# Patient Record
Sex: MALE | Race: WHITE | NOT HISPANIC OR LATINO | Employment: OTHER | URBAN - METROPOLITAN AREA
[De-identification: names, ages, dates, MRNs, and addresses within clinical notes are randomized per-mention and may not be internally consistent; named-entity substitution may affect disease eponyms.]

---

## 2017-01-17 ENCOUNTER — HOSPITAL ENCOUNTER (OUTPATIENT)
Dept: GENERAL RADIOLOGY | Facility: HOSPITAL | Age: 72
Discharge: HOME | End: 2017-01-17
Admitting: ORTHOPAEDIC SURGERY

## 2017-01-17 ENCOUNTER — HOSPITAL ENCOUNTER (OUTPATIENT)
Dept: GENERAL RADIOLOGY | Facility: HOSPITAL | Age: 72
Discharge: HOME | End: 2017-01-17

## 2017-01-17 ENCOUNTER — APPOINTMENT (OUTPATIENT)
Dept: PREADMISSION TESTING | Facility: HOSPITAL | Age: 72
End: 2017-01-17

## 2017-01-17 VITALS
HEART RATE: 54 BPM | RESPIRATION RATE: 16 BRPM | WEIGHT: 301.6 LBS | DIASTOLIC BLOOD PRESSURE: 83 MMHG | OXYGEN SATURATION: 95 % | HEIGHT: 77 IN | BODY MASS INDEX: 35.61 KG/M2 | SYSTOLIC BLOOD PRESSURE: 150 MMHG | TEMPERATURE: 97 F

## 2017-01-17 DIAGNOSIS — M25.561 CHRONIC PAIN OF RIGHT KNEE: Primary | ICD-10-CM

## 2017-01-17 DIAGNOSIS — G89.29 CHRONIC PAIN OF RIGHT KNEE: Primary | ICD-10-CM

## 2017-01-17 LAB
ABO GROUP BLD: NORMAL
ALBUMIN SERPL-MCNC: 4 G/DL (ref 3.5–5.2)
ALBUMIN/GLOB SERPL: 1.3 G/DL
ALP SERPL-CCNC: 45 U/L (ref 39–117)
ALT SERPL W P-5'-P-CCNC: 34 U/L (ref 1–41)
ANION GAP SERPL CALCULATED.3IONS-SCNC: 13.1 MMOL/L
AST SERPL-CCNC: 29 U/L (ref 1–40)
BILIRUB SERPL-MCNC: 0.7 MG/DL (ref 0.1–1.2)
BILIRUB UR QL STRIP: NEGATIVE
BLD GP AB SCN SERPL QL: NEGATIVE
BUN BLD-MCNC: 20 MG/DL (ref 8–23)
BUN/CREAT SERPL: 15.6 (ref 7–25)
CALCIUM SPEC-SCNC: 9.4 MG/DL (ref 8.6–10.5)
CHLORIDE SERPL-SCNC: 99 MMOL/L (ref 98–107)
CLARITY UR: CLEAR
CO2 SERPL-SCNC: 25.9 MMOL/L (ref 22–29)
COLOR UR: YELLOW
CREAT BLD-MCNC: 1.28 MG/DL (ref 0.76–1.27)
DEPRECATED RDW RBC AUTO: 46.8 FL (ref 37–54)
ERYTHROCYTE [DISTWIDTH] IN BLOOD BY AUTOMATED COUNT: 13.6 % (ref 11.5–14.5)
GFR SERPL CREATININE-BSD FRML MDRD: 55 ML/MIN/1.73
GLOBULIN UR ELPH-MCNC: 3.2 GM/DL
GLUCOSE BLD-MCNC: 100 MG/DL (ref 65–99)
GLUCOSE UR STRIP-MCNC: NEGATIVE MG/DL
HCT VFR BLD AUTO: 51.6 % (ref 40.4–52.2)
HGB BLD-MCNC: 17.7 G/DL (ref 13.7–17.6)
HGB UR QL STRIP.AUTO: NEGATIVE
INR PPP: 1.04 (ref 0.9–1.1)
KETONES UR QL STRIP: NEGATIVE
LEUKOCYTE ESTERASE UR QL STRIP.AUTO: NEGATIVE
MCH RBC QN AUTO: 32.8 PG (ref 27–32.7)
MCHC RBC AUTO-ENTMCNC: 34.3 G/DL (ref 32.6–36.4)
MCV RBC AUTO: 95.6 FL (ref 79.8–96.2)
NITRITE UR QL STRIP: NEGATIVE
PH UR STRIP.AUTO: 7 [PH] (ref 5–8)
PLATELET # BLD AUTO: 179 10*3/MM3 (ref 140–500)
PMV BLD AUTO: 10.6 FL (ref 6–12)
POTASSIUM BLD-SCNC: 4.3 MMOL/L (ref 3.5–5.2)
PROT SERPL-MCNC: 7.2 G/DL (ref 6–8.5)
PROT UR QL STRIP: NEGATIVE
PROTHROMBIN TIME: 13.2 SECONDS (ref 11.7–14.2)
RBC # BLD AUTO: 5.4 10*6/MM3 (ref 4.6–6)
RH BLD: NEGATIVE
SODIUM BLD-SCNC: 138 MMOL/L (ref 136–145)
SP GR UR STRIP: 1.02 (ref 1–1.03)
UROBILINOGEN UR QL STRIP: NORMAL
WBC NRBC COR # BLD: 9.02 10*3/MM3 (ref 4.5–10.7)

## 2017-01-17 PROCEDURE — 93010 ELECTROCARDIOGRAM REPORT: CPT | Performed by: INTERNAL MEDICINE

## 2017-01-17 RX ORDER — VALACYCLOVIR HYDROCHLORIDE 500 MG/1
500 TABLET, FILM COATED ORAL AS NEEDED
COMMUNITY

## 2017-01-17 RX ORDER — GUAIFENESIN 400 MG/1
400 TABLET ORAL 2 TIMES DAILY
COMMUNITY

## 2017-01-17 RX ORDER — ACETAMINOPHEN 500 MG
1000 TABLET ORAL AS NEEDED
COMMUNITY
End: 2022-09-09 | Stop reason: HOSPADM

## 2017-01-17 RX ORDER — ATENOLOL 25 MG/1
25 TABLET ORAL DAILY
COMMUNITY
End: 2022-09-09 | Stop reason: HOSPADM

## 2017-01-17 RX ORDER — CHLORHEXIDINE GLUCONATE 500 MG/1
1 CLOTH TOPICAL TAKE AS DIRECTED
COMMUNITY
End: 2017-01-22 | Stop reason: HOSPADM

## 2017-01-17 RX ORDER — ASPIRIN 81 MG/1
81 TABLET ORAL DAILY
COMMUNITY
End: 2017-01-22 | Stop reason: HOSPADM

## 2017-01-17 RX ORDER — HYDROCHLOROTHIAZIDE 25 MG/1
25 TABLET ORAL DAILY
COMMUNITY
End: 2022-09-09 | Stop reason: HOSPADM

## 2017-01-17 RX ORDER — RAMIPRIL 10 MG/1
10 CAPSULE ORAL 2 TIMES DAILY
COMMUNITY
End: 2022-09-09 | Stop reason: HOSPADM

## 2017-01-17 RX ORDER — PROBENECID 500 MG/1
500 TABLET, FILM COATED ORAL 2 TIMES DAILY
COMMUNITY
End: 2022-10-05

## 2017-01-17 RX ORDER — ATORVASTATIN CALCIUM 10 MG/1
10 TABLET, FILM COATED ORAL EVERY EVENING
COMMUNITY

## 2017-01-17 NOTE — MR AVS SNAPSHOT
Chaitanya Montesinos   2017 11:00 AM   Appointment    Provider:  DAVID Boucher   Department:  [facility] PREADMISSION T   Dept Phone:  [phone]                Your Full Care Plan              Your Updated Medication List          This list is accurate as of: 17 11:12 AM.  Always use your most recent med list.                acetaminophen 500 MG tablet   Commonly known as:  TYLENOL       aspirin 81 MG EC tablet       atenolol 25 MG tablet   Commonly known as:  TENORMIN       atorvastatin 10 MG tablet   Commonly known as:  LIPITOR       Chlorhexidine Gluconate Cloth 2 % pads       hydrochlorothiazide 25 MG tablet   Commonly known as:  HYDRODIURIL       MUCUS RELIEF CHEST CONGESTION 400 MG tablet   Generic drug:  guaiFENesin       MULTIVITAMIN ADULT PO       mupirocin 2 % nasal ointment   Commonly known as:  BACTROBAN       probenecid 500 MG tablet   Commonly known as:  BENEMID       ramipril 10 MG capsule   Commonly known as:  ALTACE       valACYclovir 500 MG tablet   Commonly known as:  VALTREX               [employer] Signup     [facility] [employer] allows you to send messages to your doctor, view your test results, renew your prescriptions, schedule appointments, and more. To sign up, go to [employer] and click on the Sign Up Now link in the New User? box. Enter your [employer] Activation Code exactly as it appears below along with the last four digits of your Social Security Number and your Date of Birth () to complete the sign-up process. If you do not sign up before the expiration date, you must request a new code.    [employer] Activation Code: [email or web address]  Expires: 2017 10:43 AM    If you have questions, you can email [email or web address] or call [phone] to talk to our [employer] staff. Remember, [employer] is NOT to be used for urgent needs. For medical emergencies, dial 911.               Other Info from Your Visit           Allergies     "   No Known Allergies      Vital Signs     Blood Pressure Pulse Temperature Respirations Height Weight    150/83 (BP Location: Right arm, Patient Position: Sitting) 54 97 °F (36.1 °C) (Oral) 16 77\" (195.6 cm) 301 lb 9.6 oz (137 kg)    Oxygen Saturation Body Mass Index Smoking Status             95% 35.76 kg/m2 Former Smoker           Discharge Instructions       Take the following medications the morning of surgery with a small sip of water.  ATENOLOL    Arrive to hospital on your day of surgery at 7:30AM    General Instructions:  • Do not eat or drink after midnight: includes water, mints, or gum. You may brush your teeth.  • Do not smoke, chew tobacco, or drink alcohol.  • Bring medications in original bottles, any inhalers and if applicable your C-PAP/ BI-PAP machine.  • Bring any papers given to you in the doctor’s office.  • Wear clean comfortable clothes and socks.  • Do not wear contact lenses or make-up.  Bring a case for your glasses if applicable.   • Bring crutches or walker if applicable.  • Leave all other valuables and jewelry at home.    If you were given a blood bank ID arm band remember to bring it with you the day of surgery.    Preventing a Surgical Site Infection:  Shower on the morning of surgery using a fresh bar of anti-bacterial soap (such as Dial) and clean washcloth.  Dry with a clean towel and dress in clean clothing.  For 2 to 3 days before surgery, avoid shaving with a razor near where you will have surgery because the razor can irritate skin and make it easier to develop an infection  Ask your surgeon if you will be receiving antibiotics prior to surgery  Make sure you, your family, and all healthcare providers clean their hands with soap and water or an alcohol based hand  before caring for you or your wound  If at all possible, quit smoking as many days before surgery as you can.    Day of surgery:  Upon arrival, a Pre-op nurse and Anesthesiologist will review your health " history, obtain vital signs, and answer questions you may have.  The only belongings needed at this time will be your home medications and if applicable your C-PAP/BI-PAP machine.  If you are staying overnight your family can leave the rest of your belongings in the car and bring them to your room later.  A Pre-op nurse will start an IV and you may receive medication in preparation for surgery, including something to help you relax.  Your family will be able to see you in the Pre-op area.  While you are in surgery your family should notify the waiting room  if they leave the waiting room area and provide a contact phone number.    Please be aware that surgery does come with discomfort.  We want to make every effort to control your discomfort so please discuss any uncontrolled symptoms with your nurse.   Your doctor will most likely have prescribed pain medications.      If you are going home after surgery you will receive individualized written care instructions before being discharged.  A responsible adult must drive you to and from the hospital on the day of your surgery and stay with you for 24 hours.    If you are staying overnight following surgery, you will be transported to your hospital room following the recovery period.  [facility] has all private rooms.    If you have any questions please call Pre-Admission Testing at [phone].  Deductibles and co-payments are collected on the day of service. Please be prepared to pay the required co-pay, deductible or deposit on the day of service as defined by your plan.    2% CHLORAHEXIDINE GLUCONATE* CLOTH  Preparing or “prepping” skin before surgery can reduce the risk of infection at the surgical site. To make the process easier, [facility] has chosen disposable cloths moistened with a rinse-free, 2% Chlorhexidine Gluconate (CHG) antiseptic solution. The steps below outline the prepping process and should be carefully  followed.        Use the prep cloth on the area that is circled in the diagram             Directions Night before Surgery  1) Shower using a fresh bar of anti-bacterial soap (such as Dial) and clean washcloth.  Use a clean towel to completely dry your skin.  2) Do not use any lotions, oils or creams on your skin.  3) Open the package and remove 1 cloth, wipe your skin for 30 seconds in a circular motion.  Allow to dry for 3 minutes.  4) Repeat #3 with second cloth.  5) Do not touch your eyes, ears, or mouth with the prep cloth.  6) Allow the wet prep solution to air dry.  7) Discard the prep cloth and wash your hands with soap and water.   8) Dress in clean bed clothes and sleep on fresh clean bed sheets.   9) You may experience some temporary itching after the prep.    Directions Day of Surgery  1) Repeat steps 1,2,3,4,5,6,7, and 9.   2) Dress in clean clothes before coming to the hospital.    BACTROBAN NASAL OINTMENT  There are many germs normally in your nose. Bactroban is an ointment that will help reduce these germs. Please follow these instructions for Bactroban use:    ____Two days before surgery in the evening Date________    ____The day before surgery in the morning  Date________    ____The day before surgery in the evening              Date________    ____The day of surgery in the morning    Date________    **Squirt ½ package of Bactroban Ointment onto a cotton applicator and apply to inside of 1st nostril.  Squirt the remaining Bactroban and apply to the inside of the other nostril.         SYMPTOMS OF A STROKE    Call 911 or have someone take you to the Emergency Department if you have any of the following:    · Sudden numbness or weakness of your face, arm or leg especially on one side of the body  · Sudden confusion, diffiiculty speaking or trouble understanding   · Changes in your vision or loss of sight in one eye  · Sudden severe headache with no known cause  · sudden dizziness, trouble walking,  loss of balance or coordination    It is important to seek emergency care right away if you have further stroke symptoms. If you get emergency help quickly, the powerful clot-dissolving medicines can reduce the disabilities caused by a stroke.     For more information:    American Stroke Association  8-509-9-STROKE  www.strokeassociation.org           IF YOU SMOKE OR USE TOBACCO PLEASE READ THE FOLLOWING:    Why is smoking bad for me?  Smoking increases the risk of heart disease, lung disease, vascular disease, stroke, and cancer.     If you smoke, STOP!    If you would like more information on quitting smoking, please visit the [employer] website: [email or web address]   This link will provide additional resources including the QUIT line and the Beat the Pack support groups.     For more information:    American Cancer Society  (685) 123-1169    American Heart Association  1-456-218-3771

## 2017-01-17 NOTE — PROGRESS NOTES
Physical Therapy Outpatient Preoperative Total Joint Evaluation     Patient Name: Chaitanya Montesinos  : 1945  MRN: [id number]  Today's Date: 2017         Surgery Date: 17    There is no problem list on file for this patient.       Past Medical History   Diagnosis Date   • Arthritis    • BPH (benign prostatic hyperplasia)    • Eczema    • Gout    • High cholesterol    • Hypertension    • Joint pain    • Low back pain         Past Surgical History   Procedure Laterality Date   • Cataract extraction Right    • Cystoscopy transurethral resection of prostate     • Tonsillectomy     • Knee arthroscopy Bilateral               TOTAL JOINT PREOP EVAL (last 72 hours)      Total Joint Preop Evaluation       17 1135                Preoperative Evaluation    Surgery TKR: Right  -TV        Surgery Date 17  -TV        Previous Total Joint No  -TV        Attended Class yes  -TV        Prior Activity Status    All Household independent  -TV        All Community independent  -TV        Gait independent  -TV        Transfers independent  -TV         Spouse  -TV        DC Plans Home  -TV        Assist at home Spouse  -TV        Home Environment 1 story  -TV        Exterior steps 1 rail   2  -TV        Pain 0-10    Pain Level 5  -TV        Location r knee  -TV        LE Measurements - ROM    Hip Flexion - Right AROM is WNL;Strength is grossly > or equal to 3/5  -TV        Hip Abduction - Right AROM is WNL;Strength is grossly > or equal to 3/5  -TV        Knee Flexion - Right Strength is grossly > or equal to 3/5   120  -TV        Knee Extension - Right Strength is grossly > or equal to 3/5   -10  -TV        Hip Flexion - Left AROM is WNL;Strength is grossly > or equal to 3/5  -TV        Hip Abduction - Left AROM is WNL;Strength is grossly > or equal to 3/5  -TV        Knee Flexion - Left Strength is grossly > or equal to 3/5   120  -TV        Knee Extension - Left Strength is grossly > or equal to 3/5    -5  -TV        UE ROM/Strength    UE ROM/Strength adequate for walker use  -TV        Other Measurements    Sensation/Circulation intact  -TV        Gait Observation no device  -TV        Genu Valgus/Varus Right:;Valgus  -TV        Equipment    Equipment Patient Needs Walker  -TV        ASSESSMENT    Goal Patient demonstrates good understanding of post-op P.T.;Exercises;Surgical Procedure  -TV        Goal Met? Yes  -TV        Anticipated Progress good  -TV        Patient agrees with Plan of Treatment? Yes  -TV        Plan Will see for post op TJR protocol  -TV          User Key  (r) = Recorded By, (t) = Taken By, (c) = Cosigned By    Initials Name Effective Dates    TV Yuly Lewis, PT 01/07/16 -              Time Calculation:          Therapy Charges for Today     Code Description Service Date Service Provider Modifiers Qty    01845339179 HC PT MOBILITY CURRENT 1/17/2017 Yuly Lewis, PT GP, CI 1    26422223826 HC PT MOBILITY PROJECTED 1/17/2017 Yuly Lewis, PT GP, CI 1    27008146215 HC PT MOBILITY DISCHARGE 1/17/2017 Yuly Lewis, PT GP, CI 1    38039870751 HC PAT-TOTAL JOINT CLASS 1/17/2017 Yuly Lewis, PT  1    26204652789 HC PT EVAL LOW COMPLEXITY 1 1/17/2017 Yuly Lewis, PT GP 1            PT G-Codes  PT Professional Judgement Used?: Yes  Functional Limitation: Mobility: Walking and moving around  Mobility: Walking and Moving Around Current Status (): At least 1 percent but less than 20 percent impaired, limited or restricted  Mobility: Walking and Moving Around Goal Status (): At least 1 percent but less than 20 percent impaired, limited or restricted  Mobility: Walking and Moving Around Discharge Status (): At least 1 percent but less than 20 percent impaired, limited or restricted      Yuly Lewis, PT  1/17/2017

## 2017-01-17 NOTE — DISCHARGE INSTRUCTIONS
Take the following medications the morning of surgery with a small sip of water.  ATENOLOL    Arrive to hospital on your day of surgery at 7:30AM    General Instructions:  • Do not eat or drink after midnight: includes water, mints, or gum. You may brush your teeth.  • Do not smoke, chew tobacco, or drink alcohol.  • Bring medications in original bottles, any inhalers and if applicable your C-PAP/ BI-PAP machine.  • Bring any papers given to you in the doctor’s office.  • Wear clean comfortable clothes and socks.  • Do not wear contact lenses or make-up.  Bring a case for your glasses if applicable.   • Bring crutches or walker if applicable.  • Leave all other valuables and jewelry at home.    If you were given a blood bank ID arm band remember to bring it with you the day of surgery.    Preventing a Surgical Site Infection:  Shower on the morning of surgery using a fresh bar of anti-bacterial soap (such as Dial) and clean washcloth.  Dry with a clean towel and dress in clean clothing.  For 2 to 3 days before surgery, avoid shaving with a razor near where you will have surgery because the razor can irritate skin and make it easier to develop an infection  Ask your surgeon if you will be receiving antibiotics prior to surgery  Make sure you, your family, and all healthcare providers clean their hands with soap and water or an alcohol based hand  before caring for you or your wound  If at all possible, quit smoking as many days before surgery as you can.    Day of surgery:  Upon arrival, a Pre-op nurse and Anesthesiologist will review your health history, obtain vital signs, and answer questions you may have.  The only belongings needed at this time will be your home medications and if applicable your C-PAP/BI-PAP machine.  If you are staying overnight your family can leave the rest of your belongings in the car and bring them to your room later.  A Pre-op nurse will start an IV and you may receive  medication in preparation for surgery, including something to help you relax.  Your family will be able to see you in the Pre-op area.  While you are in surgery your family should notify the waiting room  if they leave the waiting room area and provide a contact phone number.    Please be aware that surgery does come with discomfort.  We want to make every effort to control your discomfort so please discuss any uncontrolled symptoms with your nurse.   Your doctor will most likely have prescribed pain medications.      If you are going home after surgery you will receive individualized written care instructions before being discharged.  A responsible adult must drive you to and from the hospital on the day of your surgery and stay with you for 24 hours.    If you are staying overnight following surgery, you will be transported to your hospital room following the recovery period.  [facility] has all private rooms.    If you have any questions please call Pre-Admission Testing at [phone].  Deductibles and co-payments are collected on the day of service. Please be prepared to pay the required co-pay, deductible or deposit on the day of service as defined by your plan.    2% CHLORAHEXIDINE GLUCONATE* CLOTH  Preparing or “prepping” skin before surgery can reduce the risk of infection at the surgical site. To make the process easier, [facility] has chosen disposable cloths moistened with a rinse-free, 2% Chlorhexidine Gluconate (CHG) antiseptic solution. The steps below outline the prepping process and should be carefully followed.        Use the prep cloth on the area that is circled in the diagram             Directions Night before Surgery  1) Shower using a fresh bar of anti-bacterial soap (such as Dial) and clean washcloth.  Use a clean towel to completely dry your skin.  2) Do not use any lotions, oils or creams on your skin.  3) Open the package and remove 1 cloth, wipe  your skin for 30 seconds in a circular motion.  Allow to dry for 3 minutes.  4) Repeat #3 with second cloth.  5) Do not touch your eyes, ears, or mouth with the prep cloth.  6) Allow the wet prep solution to air dry.  7) Discard the prep cloth and wash your hands with soap and water.   8) Dress in clean bed clothes and sleep on fresh clean bed sheets.   9) You may experience some temporary itching after the prep.    Directions Day of Surgery  1) Repeat steps 1,2,3,4,5,6,7, and 9.   2) Dress in clean clothes before coming to the hospital.    BACTROBAN NASAL OINTMENT  There are many germs normally in your nose. Bactroban is an ointment that will help reduce these germs. Please follow these instructions for Bactroban use:    ____Two days before surgery in the evening Date________    ____The day before surgery in the morning  Date________    ____The day before surgery in the evening              Date________    ____The day of surgery in the morning    Date________    **Squirt ½ package of Bactroban Ointment onto a cotton applicator and apply to inside of 1st nostril.  Squirt the remaining Bactroban and apply to the inside of the other nostril.

## 2017-01-19 ENCOUNTER — APPOINTMENT (OUTPATIENT)
Dept: GENERAL RADIOLOGY | Facility: HOSPITAL | Age: 72
End: 2017-01-19

## 2017-01-19 ENCOUNTER — ANESTHESIA (OUTPATIENT)
Dept: PERIOP | Facility: HOSPITAL | Age: 72
End: 2017-01-19

## 2017-01-19 ENCOUNTER — ANESTHESIA EVENT (OUTPATIENT)
Dept: PERIOP | Facility: HOSPITAL | Age: 72
End: 2017-01-19

## 2017-01-19 PROBLEM — M17.9 OA (OSTEOARTHRITIS) OF KNEE: Status: ACTIVE | Noted: 2017-01-19

## 2017-01-19 PROCEDURE — 25010000002 ONDANSETRON PER 1 MG: Performed by: ANESTHESIOLOGY

## 2017-01-19 PROCEDURE — 25010000002 PROPOFOL 10 MG/ML EMULSION: Performed by: ANESTHESIOLOGY

## 2017-01-19 PROCEDURE — 25010000002 DEXAMETHASONE PER 1 MG: Performed by: ANESTHESIOLOGY

## 2017-01-19 PROCEDURE — 25010000002 FENTANYL CITRATE (PF) 100 MCG/2ML SOLUTION: Performed by: ANESTHESIOLOGY

## 2017-01-19 PROCEDURE — 73560 X-RAY EXAM OF KNEE 1 OR 2: CPT

## 2017-01-19 PROCEDURE — 25010000002 NEOSTIGMINE 10 MG/10ML SOLUTION: Performed by: ANESTHESIOLOGY

## 2017-01-19 PROCEDURE — 25010000003 CEFAZOLIN IN DEXTROSE 2-4 GM/100ML-% SOLUTION: Performed by: ANESTHESIOLOGY

## 2017-01-19 PROCEDURE — 25010000002 ROPIVACAINE PER 1 MG: Performed by: ANESTHESIOLOGY

## 2017-01-19 RX ORDER — DEXAMETHASONE SODIUM PHOSPHATE 10 MG/ML
INJECTION INTRAMUSCULAR; INTRAVENOUS AS NEEDED
Status: DISCONTINUED | OUTPATIENT
Start: 2017-01-19 | End: 2017-01-19 | Stop reason: SURG

## 2017-01-19 RX ORDER — ROPIVACAINE HYDROCHLORIDE 5 MG/ML
INJECTION, SOLUTION EPIDURAL; INFILTRATION; PERINEURAL AS NEEDED
Status: DISCONTINUED | OUTPATIENT
Start: 2017-01-19 | End: 2017-01-19 | Stop reason: SURG

## 2017-01-19 RX ORDER — NEOSTIGMINE METHYLSULFATE 1 MG/ML
INJECTION, SOLUTION INTRAVENOUS AS NEEDED
Status: DISCONTINUED | OUTPATIENT
Start: 2017-01-19 | End: 2017-01-19 | Stop reason: SURG

## 2017-01-19 RX ORDER — GLYCOPYRROLATE 0.2 MG/ML
INJECTION INTRAMUSCULAR; INTRAVENOUS AS NEEDED
Status: DISCONTINUED | OUTPATIENT
Start: 2017-01-19 | End: 2017-01-19 | Stop reason: SURG

## 2017-01-19 RX ORDER — LIDOCAINE HYDROCHLORIDE 20 MG/ML
INJECTION, SOLUTION INFILTRATION; PERINEURAL AS NEEDED
Status: DISCONTINUED | OUTPATIENT
Start: 2017-01-19 | End: 2017-01-19 | Stop reason: SURG

## 2017-01-19 RX ORDER — ONDANSETRON 2 MG/ML
INJECTION INTRAMUSCULAR; INTRAVENOUS AS NEEDED
Status: DISCONTINUED | OUTPATIENT
Start: 2017-01-19 | End: 2017-01-19 | Stop reason: SURG

## 2017-01-19 RX ORDER — PROPOFOL 10 MG/ML
VIAL (ML) INTRAVENOUS AS NEEDED
Status: DISCONTINUED | OUTPATIENT
Start: 2017-01-19 | End: 2017-01-19 | Stop reason: SURG

## 2017-01-19 RX ORDER — ROCURONIUM BROMIDE 10 MG/ML
INJECTION, SOLUTION INTRAVENOUS AS NEEDED
Status: DISCONTINUED | OUTPATIENT
Start: 2017-01-19 | End: 2017-01-19 | Stop reason: SURG

## 2017-01-19 RX ORDER — CEFAZOLIN SODIUM 2 G/100ML
INJECTION, SOLUTION INTRAVENOUS AS NEEDED
Status: DISCONTINUED | OUTPATIENT
Start: 2017-01-19 | End: 2017-01-19 | Stop reason: SURG

## 2017-01-19 RX ADMIN — PROPOFOL 100 MG: 10 INJECTION, EMULSION INTRAVENOUS at 10:31

## 2017-01-19 RX ADMIN — CEFAZOLIN SODIUM 2 G: 2 INJECTION, SOLUTION INTRAVENOUS at 12:07

## 2017-01-19 RX ADMIN — FENTANYL CITRATE 75 MCG: 50 INJECTION INTRAMUSCULAR; INTRAVENOUS at 12:03

## 2017-01-19 RX ADMIN — FENTANYL CITRATE 50 MCG: 50 INJECTION INTRAMUSCULAR; INTRAVENOUS at 12:09

## 2017-01-19 RX ADMIN — ONDANSETRON 4 MG: 2 INJECTION INTRAMUSCULAR; INTRAVENOUS at 12:12

## 2017-01-19 RX ADMIN — LIDOCAINE HYDROCHLORIDE 100 MG: 20 INJECTION, SOLUTION INFILTRATION; PERINEURAL at 10:28

## 2017-01-19 RX ADMIN — GLYCOPYRROLATE 0.3 MG: 0.2 INJECTION INTRAMUSCULAR; INTRAVENOUS at 11:40

## 2017-01-19 RX ADMIN — SODIUM CHLORIDE, POTASSIUM CHLORIDE, SODIUM LACTATE AND CALCIUM CHLORIDE: 600; 310; 30; 20 INJECTION, SOLUTION INTRAVENOUS at 12:20

## 2017-01-19 RX ADMIN — GLYCOPYRROLATE 0.4 MG: 0.2 INJECTION INTRAMUSCULAR; INTRAVENOUS at 12:16

## 2017-01-19 RX ADMIN — DEXAMETHASONE SODIUM PHOSPHATE 6 MG: 10 INJECTION INTRAMUSCULAR; INTRAVENOUS at 10:50

## 2017-01-19 RX ADMIN — CEFAZOLIN SODIUM 3 G: 2 INJECTION, SOLUTION INTRAVENOUS at 10:18

## 2017-01-19 RX ADMIN — PROPOFOL 200 MG: 10 INJECTION, EMULSION INTRAVENOUS at 10:28

## 2017-01-19 RX ADMIN — FENTANYL CITRATE 125 MCG: 50 INJECTION INTRAMUSCULAR; INTRAVENOUS at 10:26

## 2017-01-19 RX ADMIN — ROPIVACAINE HYDROCHLORIDE 30 ML: 5 INJECTION, SOLUTION EPIDURAL; INFILTRATION; PERINEURAL at 09:15

## 2017-01-19 RX ADMIN — ROCURONIUM BROMIDE 50 MG: 10 INJECTION INTRAVENOUS at 10:28

## 2017-01-19 RX ADMIN — NEOSTIGMINE METHYLSULFATE 3 MG: 1 INJECTION INTRAVENOUS at 12:15

## 2017-01-19 NOTE — ANESTHESIA PREPROCEDURE EVALUATION
Anesthesia Evaluation     Patient summary reviewed and Nursing notes reviewed    No history of anesthetic complications   Airway   Mallampati: II  TM distance: >3 FB  Neck ROM: full  Dental - normal exam     Pulmonary - negative pulmonary ROS and normal exam   Cardiovascular - normal exam  Exercise tolerance: good (4-7 METS)  (+) hypertension,     ECG reviewed  Patient on routine beta blocker and Beta blocker given within 24 hours of surgery  ROS comment: SINUS RHYTHM  Electronically Signed by:  Jero Yang MD 17-Jan-2017 15:28:26      Neuro/Psych- negative ROS  GI/Hepatic/Renal/Endo    (+) obesity,      Musculoskeletal (-) negative ROS    Abdominal   (+) obese,     Bowel sounds: normal.   Substance History - negative use     OB/GYN negative ob/gyn ROS         Other                             Anesthesia Plan    ASA 2     general and regional     intravenous induction   Anesthetic plan and risks discussed with patient.

## 2017-01-19 NOTE — ANESTHESIA PROCEDURE NOTES
Airway  Urgency: elective    Difficult airway    General Information and Staff    Patient location during procedure: OR  Anesthesiologist: GUS CONDE    Indications and Patient Condition  Indications for airway management: airway protection    Preoxygenated: yes  Mask difficulty assessment: 2 - vent by mask + OA or adjuvant +/- NMBA    Final Airway Details  Final airway type: endotracheal airway      Successful airway: ETT    Successful intubation technique: video laryngoscopy and direct laryngoscopy  Facilitating devices/methods: cricoid pressure  Endotracheal tube insertion site: oral  Blade: Jane  Blade size: #4  ETT size: 8.0 mm  Placement verified by: chest auscultation and capnometry   Measured from: lips  ETT to lips (cm): 23  Number of attempts at approach: 3 or more    Additional Comments  With DL, could barely see epiglottis. With glide scope, still very anterior.  Large glide scope blade, and tube with stylet allowed successful intubation.  [place] scope gave a good view, but difficult to manipulate tube to glottis.

## 2017-01-19 NOTE — ANESTHESIA PROCEDURE NOTES
Peripheral Block    Patient location during procedure: holding area  Start time: 1/19/2017 9:10 AM  Stop time: 1/19/2017 9:15 AM  Reason for block: at surgeon's request and post-op pain management  Performed by  Anesthesiologist: GUS CONDE  Preanesthetic Checklist  Completed: patient identified, site marked, surgical consent, pre-op evaluation, timeout performed, IV checked, risks and benefits discussed and monitors and equipment checked  Peripheral Block Prep:  Prep: ChloraPrep  Patient monitoring: blood pressure monitoring, continuous pulse oximetry and EKG  Peripheral Procedure  Sedation:yes  Guidance:ultrasound guided  Images:still images obtained  Laterality:rightBlock Type:adductor canal block  Injection Technique:single-shotNeedle Type:echogenic  Needle Gauge:21 G  ULTRASOUND INTERPRETATION. Using ultrasound guidance a gauge needle was placed in close proximity to the femoral nerve, at which point, under ultrasound guidance anesthetic was injected in the area of the nerve and spread of the anesthesia was seen on ultrasound in close proximity thereto.  There were no abnormalities seen on ultrasound; a digital image was taken; and the patient tolerated the procedure with no complications.   Medications  Local Injected:ropivacaine 0.5% without epinephrine Local Amount Injected:30mL  Post Assessment  Patient Tolerance:comfortable throughout block  Complications:no

## 2017-01-19 NOTE — ANESTHESIA POSTPROCEDURE EVALUATION
Patient: Chaitanya Montesinos    Procedure Summary     Date Anesthesia Start Anesthesia Stop Room / Location    01/19/17 1018 1245  [facility] OR 23 / [facility] MAIN OR       Procedure Diagnosis Surgeon Provider    RT TOTAL KNEE ARTHROPLASTY (Right Knee) No diagnosis on file. MD Valentina Choudhury MD          Anesthesia Type: general, regional  Last vitals  /67 (01/19/17 1420)    Temp 36.4 °C (97.6 °F) (01/19/17 1420)    Pulse 69 (01/19/17 1420)   Resp 14 (01/19/17 1420)    SpO2 97 % (01/19/17 1420)      Post Anesthesia Care and Evaluation    Patient location during evaluation: PACU  Patient participation: complete - patient participated  Level of consciousness: awake  Pain management: adequate  Airway patency: patent  Anesthetic complications: No anesthetic complications    Cardiovascular status: acceptable  Respiratory status: acceptable  Hydration status: acceptable

## 2017-02-03 NOTE — ADDENDUM NOTE
Addendum  created 02/03/17 0151 by Bruno Carranza MD    Anesthesia Intra Blocks edited, Sign clinical note

## 2017-11-08 ENCOUNTER — HOSPITAL ENCOUNTER (OUTPATIENT)
Dept: GENERAL RADIOLOGY | Facility: HOSPITAL | Age: 72
Discharge: HOME | End: 2017-11-08
Admitting: ORTHOPAEDIC SURGERY

## 2017-11-08 ENCOUNTER — APPOINTMENT (OUTPATIENT)
Dept: PREADMISSION TESTING | Facility: HOSPITAL | Age: 72
End: 2017-11-08

## 2017-11-08 ENCOUNTER — HOSPITAL ENCOUNTER (OUTPATIENT)
Dept: GENERAL RADIOLOGY | Facility: HOSPITAL | Age: 72
Discharge: HOME | End: 2017-11-08

## 2017-11-08 VITALS
WEIGHT: 303 LBS | RESPIRATION RATE: 20 BRPM | TEMPERATURE: 96.7 F | HEART RATE: 54 BPM | HEIGHT: 77 IN | OXYGEN SATURATION: 97 % | BODY MASS INDEX: 35.78 KG/M2 | DIASTOLIC BLOOD PRESSURE: 58 MMHG | SYSTOLIC BLOOD PRESSURE: 104 MMHG

## 2017-11-08 LAB
ALBUMIN SERPL-MCNC: 3.9 G/DL (ref 3.5–5.2)
ALBUMIN/GLOB SERPL: 1.3 G/DL
ALP SERPL-CCNC: 43 U/L (ref 39–117)
ALT SERPL W P-5'-P-CCNC: 25 U/L (ref 1–41)
ANION GAP SERPL CALCULATED.3IONS-SCNC: 11.5 MMOL/L
AST SERPL-CCNC: 26 U/L (ref 1–40)
BACTERIA UR QL AUTO: ABNORMAL /HPF
BILIRUB SERPL-MCNC: 0.6 MG/DL (ref 0.1–1.2)
BILIRUB UR QL STRIP: NEGATIVE
BUN BLD-MCNC: 18 MG/DL (ref 8–23)
BUN/CREAT SERPL: 14.9 (ref 7–25)
CALCIUM SPEC-SCNC: 9.2 MG/DL (ref 8.6–10.5)
CHLORIDE SERPL-SCNC: 103 MMOL/L (ref 98–107)
CLARITY UR: CLEAR
CO2 SERPL-SCNC: 25.5 MMOL/L (ref 22–29)
COLOR UR: YELLOW
CREAT BLD-MCNC: 1.21 MG/DL (ref 0.76–1.27)
DEPRECATED RDW RBC AUTO: 44 FL (ref 37–54)
ERYTHROCYTE [DISTWIDTH] IN BLOOD BY AUTOMATED COUNT: 13 % (ref 11.5–14.5)
GFR SERPL CREATININE-BSD FRML MDRD: 59 ML/MIN/1.73
GLOBULIN UR ELPH-MCNC: 3 GM/DL
GLUCOSE BLD-MCNC: 107 MG/DL (ref 65–99)
GLUCOSE UR STRIP-MCNC: NEGATIVE MG/DL
HCT VFR BLD AUTO: 48.3 % (ref 40.4–52.2)
HGB BLD-MCNC: 16.2 G/DL (ref 13.7–17.6)
HGB UR QL STRIP.AUTO: ABNORMAL
HYALINE CASTS UR QL AUTO: ABNORMAL /LPF
INR PPP: 0.98 (ref 0.9–1.1)
KETONES UR QL STRIP: NEGATIVE
LEUKOCYTE ESTERASE UR QL STRIP.AUTO: NEGATIVE
MCH RBC QN AUTO: 31.2 PG (ref 27–32.7)
MCHC RBC AUTO-ENTMCNC: 33.5 G/DL (ref 32.6–36.4)
MCV RBC AUTO: 93.1 FL (ref 79.8–96.2)
NITRITE UR QL STRIP: NEGATIVE
PH UR STRIP.AUTO: 7 [PH] (ref 5–8)
PLATELET # BLD AUTO: 183 10*3/MM3 (ref 140–500)
PMV BLD AUTO: 11 FL (ref 6–12)
POTASSIUM BLD-SCNC: 4.3 MMOL/L (ref 3.5–5.2)
PROT SERPL-MCNC: 6.9 G/DL (ref 6–8.5)
PROT UR QL STRIP: NEGATIVE
PROTHROMBIN TIME: 12.6 SECONDS (ref 11.7–14.2)
RBC # BLD AUTO: 5.19 10*6/MM3 (ref 4.6–6)
RBC # UR: ABNORMAL /HPF
REF LAB TEST METHOD: ABNORMAL
SODIUM BLD-SCNC: 140 MMOL/L (ref 136–145)
SP GR UR STRIP: 1.02 (ref 1–1.03)
SQUAMOUS #/AREA URNS HPF: ABNORMAL /HPF
UROBILINOGEN UR QL STRIP: ABNORMAL
WBC NRBC COR # BLD: 5.94 10*3/MM3 (ref 4.5–10.7)
WBC UR QL AUTO: ABNORMAL /HPF

## 2017-11-08 PROCEDURE — 36415 COLL VENOUS BLD VENIPUNCTURE: CPT

## 2017-11-08 PROCEDURE — 93010 ELECTROCARDIOGRAM REPORT: CPT | Performed by: INTERNAL MEDICINE

## 2017-11-08 PROCEDURE — 71020 HC CHEST PA AND LATERAL: CPT

## 2017-11-08 PROCEDURE — 85610 PROTHROMBIN TIME: CPT | Performed by: ORTHOPAEDIC SURGERY

## 2017-11-08 PROCEDURE — 80053 COMPREHEN METABOLIC PANEL: CPT | Performed by: ORTHOPAEDIC SURGERY

## 2017-11-08 PROCEDURE — 81001 URINALYSIS AUTO W/SCOPE: CPT | Performed by: ORTHOPAEDIC SURGERY

## 2017-11-08 PROCEDURE — 85027 COMPLETE CBC AUTOMATED: CPT | Performed by: ORTHOPAEDIC SURGERY

## 2017-11-08 PROCEDURE — 93005 ELECTROCARDIOGRAM TRACING: CPT

## 2017-11-08 PROCEDURE — 73560 X-RAY EXAM OF KNEE 1 OR 2: CPT

## 2017-11-08 RX ORDER — FLUTICASONE PROPIONATE 50 MCG
2 SPRAY, SUSPENSION (ML) NASAL AS NEEDED
COMMUNITY
End: 2022-10-05

## 2017-11-08 RX ORDER — ASPIRIN 81 MG/1
81 TABLET, CHEWABLE ORAL DAILY
COMMUNITY
End: 2017-12-01 | Stop reason: HOSPADM

## 2017-11-08 ASSESSMENT — KOOS JR
KOOS JR SCORE: 25
KOOS JR SCORE: 20.941

## 2017-11-08 NOTE — DISCHARGE INSTRUCTIONS
2% CHLORAHEXIDINE GLUCONATE* CLOTH  Preparing or “prepping” skin before surgery can reduce the risk of infection at the surgical site. To make the process easier, [facility] has chosen disposable cloths moistened with a rinse-free, 2% Chlorhexidine Gluconate (CHG) antiseptic solution. The steps below outline the prepping process and should be carefully followed.        Use the prep cloth on the area that is circled in the diagram             Directions Night before Surgery  1) Shower using a fresh bar of anti-bacterial soap (such as Dial) and clean washcloth.  Use a clean towel to completely dry your skin.  2) Do not use any lotions, oils or creams on your skin.  3) Open the package and remove 1 cloth, wipe your skin for 30 seconds in a circular motion.  Allow to dry for 3 minutes.  4) Repeat #3 with second cloth.  5) Do not touch your eyes, ears, or mouth with the prep cloth.  6) Allow the wet prep solution to air dry.  7) Discard the prep cloth and wash your hands with soap and water.   8) Dress in clean bed clothes and sleep on fresh clean bed sheets.   9) You may experience some temporary itching after the prep.    Directions Day of Surgery  1) Repeat steps 1,2,3,4,5,6,7, and 9.   2) Dress in clean clothes before coming to the hospital.    BACTROBAN NASAL OINTMENT  There are many germs normally in your nose. Bactroban is an ointment that will help reduce these germs. Please follow these instructions for Bactroban use:    ____Two days before surgery in the evening Date________    ____The day before surgery in the morning  Date________    ____The day before surgery in the evening              Date________    ____The day of surgery in the morning    Date________    **Squirt ½ package of Bactroban Ointment onto a cotton applicator and apply to inside of 1st nostril.  Squirt the remaining Bactroban and apply to the inside of the other nostril.    PERIDEX- ORAL:  Use only if your surgeon has  ordered  Use the night before and morning of surgery - Swish, gargle, and spit - do not swallow.Take the following medications the morning of surgery with a small sip of water:        General Instructions:  • Do not eat solid food after midnight the night before surgery.  • You may drink clear liquids day of surgery but must stop at least one hour before your hospital arrival time.  • It is beneficial for you to have a clear drink that contains carbohydrates the day of surgery.  We suggest a 12 to 20 ounce bottle of Gatorade or Powerade for non-diabetic patients or a 12 to 20 ounce bottle of G2 or Powerade Zero for diabetic patients. (Pediatric patients, are not advised to drink a 12 to 20 ounce carbohydrate drink)    Clear liquids are liquids you can see through.  Nothing red in color.     Plain water                               Sports drinks  Sodas                                   Gelatin (Jell-O)  Fruit juices without pulp such as white grape juice and apple juice  Popsicles that contain no fruit or yogurt  Tea or coffee (no cream or milk added)  Gatorade / Powerade  G2 / Powerade Zero    • Infants may have breast milk up to four hours before surgery.  • Infants drinking formula may drink formula up to six hours before surgery.   • Patients who avoid smoking, chewing tobacco and alcohol for 4 weeks prior to surgery have a reduced risk of post-operative complications.  Quit smoking as many days before surgery as you can.  • Do not smoke, use chewing tobacco or drink alcohol the day of surgery.   • If applicable bring your C-PAP/ BI-PAP machine.  • Bring any papers given to you in the doctor’s office.  • Wear clean comfortable clothes and socks.  • Do not wear contact lenses or make-up.  Bring a case for your glasses.   • Bring crutches or walker if applicable.  • Remove all piercings.  Leave jewelry and any other valuables at home.  • The Pre-Admission Testing nurse will instruct you to bring medications if  unable to obtain an accurate list in Pre-Admission Testing.        If you were given a blood bank ID arm band remember to bring it with you the day of surgery.    Preventing a Surgical Site Infection:  • For 2 to 3 days before surgery, avoid shaving with a razor because the razor can irritate skin and make it easier to develop an infection.  • The night prior to surgery sleep in a clean bed with clean clothing.  Do not allow pets to sleep with you.  • Shower on the morning of surgery using a fresh bar of anti-bacterial soap (such as Dial) and clean washcloth.  Dry with a clean towel and dress in clean clothing.  • Ask your surgeon if you will be receiving antibiotics prior to surgery.  • Make sure you, your family, and all healthcare providers clean their hands with soap and water or an alcohol based hand  before caring for you or your wound.    Day of surgery:  Upon arrival, a Pre-op nurse and Anesthesiologist will review your health history, obtain vital signs, and answer questions you may have.  The only belongings needed at this time will be your home medications and if applicable your C-PAP/BI-PAP machine.  If you are staying overnight your family can leave the rest of your belongings in the car and bring them to your room later.  A Pre-op nurse will start an IV and you may receive medication in preparation for surgery, including something to help you relax.  Your family will be able to see you in the Pre-op area.  While you are in surgery your family should notify the waiting room  if they leave the waiting room area and provide a contact phone number.    Please be aware that surgery does come with discomfort.  We want to make every effort to control your discomfort so please discuss any uncontrolled symptoms with your nurse.   Your doctor will most likely have prescribed pain medications.      If you are going home after surgery you will receive individualized written care instructions  before being discharged.  A responsible adult must drive you to and from the hospital on the day of your surgery and stay with you for 24 hours.    If you are staying overnight following surgery, you will be transported to your hospital room following the recovery period.  [facility] has all private rooms.    If you have any questions please call Pre-Admission Testing at [phone].  Deductibles and co-payments are collected on the day of service. Please be prepared to pay the required co-pay, deductible or deposit on the day of service as defined by your plan.

## 2017-11-28 ENCOUNTER — APPOINTMENT (OUTPATIENT)
Dept: GENERAL RADIOLOGY | Facility: HOSPITAL | Age: 72
End: 2017-11-28

## 2017-11-28 ENCOUNTER — ANESTHESIA EVENT (OUTPATIENT)
Dept: PERIOP | Facility: HOSPITAL | Age: 72
End: 2017-11-28

## 2017-11-28 ENCOUNTER — HOSPITAL ENCOUNTER (INPATIENT)
Facility: HOSPITAL | Age: 72
LOS: 3 days | Discharge: HOME HEALTH | End: 2017-12-01
Attending: ORTHOPAEDIC SURGERY | Admitting: ORTHOPAEDIC SURGERY

## 2017-11-28 ENCOUNTER — ANESTHESIA (OUTPATIENT)
Dept: PERIOP | Facility: HOSPITAL | Age: 72
End: 2017-11-28

## 2017-11-28 DIAGNOSIS — R26.2 DIFFICULTY WALKING: Primary | ICD-10-CM

## 2017-11-28 LAB
ABO GROUP BLD: NORMAL
BLD GP AB SCN SERPL QL: NEGATIVE
RH BLD: NEGATIVE

## 2017-11-28 PROCEDURE — 25010000002 MIDAZOLAM PER 1 MG: Performed by: ANESTHESIOLOGY

## 2017-11-28 PROCEDURE — 25010000002 HYDROMORPHONE PER 4 MG: Performed by: NURSE ANESTHETIST, CERTIFIED REGISTERED

## 2017-11-28 PROCEDURE — 25010000002 ROPIVACAINE PER 1 MG: Performed by: ORTHOPAEDIC SURGERY

## 2017-11-28 PROCEDURE — 25010000002 KETOROLAC TROMETHAMINE PER 15 MG: Performed by: ORTHOPAEDIC SURGERY

## 2017-11-28 PROCEDURE — 94799 UNLISTED PULMONARY SVC/PX: CPT

## 2017-11-28 PROCEDURE — 86901 BLOOD TYPING SEROLOGIC RH(D): CPT | Performed by: ORTHOPAEDIC SURGERY

## 2017-11-28 PROCEDURE — C1776 JOINT DEVICE (IMPLANTABLE): HCPCS | Performed by: ORTHOPAEDIC SURGERY

## 2017-11-28 PROCEDURE — 25010000002 MORPHINE SULFATE (PF) 8 MG/ML SOLUTION 1 ML VIAL: Performed by: ORTHOPAEDIC SURGERY

## 2017-11-28 PROCEDURE — 25010000003 CEFAZOLIN IN DEXTROSE 2-4 GM/100ML-% SOLUTION: Performed by: ORTHOPAEDIC SURGERY

## 2017-11-28 PROCEDURE — 25010000002 EPINEPHRINE PER 0.1 MG: Performed by: ORTHOPAEDIC SURGERY

## 2017-11-28 PROCEDURE — 86850 RBC ANTIBODY SCREEN: CPT | Performed by: ORTHOPAEDIC SURGERY

## 2017-11-28 PROCEDURE — 73560 X-RAY EXAM OF KNEE 1 OR 2: CPT

## 2017-11-28 PROCEDURE — 25010000003 CEFAZOLIN IN DEXTROSE 2-4 GM/100ML-% SOLUTION: Performed by: NURSE ANESTHETIST, CERTIFIED REGISTERED

## 2017-11-28 PROCEDURE — 86900 BLOOD TYPING SEROLOGIC ABO: CPT | Performed by: ORTHOPAEDIC SURGERY

## 2017-11-28 PROCEDURE — C1713 ANCHOR/SCREW BN/BN,TIS/BN: HCPCS | Performed by: ORTHOPAEDIC SURGERY

## 2017-11-28 PROCEDURE — 25010000002 FENTANYL CITRATE (PF) 100 MCG/2ML SOLUTION: Performed by: NURSE ANESTHETIST, CERTIFIED REGISTERED

## 2017-11-28 PROCEDURE — 0SRD0J9 REPLACEMENT OF LEFT KNEE JOINT WITH SYNTHETIC SUBSTITUTE, CEMENTED, OPEN APPROACH: ICD-10-PCS | Performed by: ORTHOPAEDIC SURGERY

## 2017-11-28 PROCEDURE — 97110 THERAPEUTIC EXERCISES: CPT

## 2017-11-28 PROCEDURE — 25010000002 SUCCINYLCHOLINE PER 20 MG: Performed by: NURSE ANESTHETIST, CERTIFIED REGISTERED

## 2017-11-28 PROCEDURE — 25010000002 PROPOFOL 10 MG/ML EMULSION: Performed by: NURSE ANESTHETIST, CERTIFIED REGISTERED

## 2017-11-28 PROCEDURE — 25010000002 ONDANSETRON PER 1 MG: Performed by: NURSE ANESTHETIST, CERTIFIED REGISTERED

## 2017-11-28 PROCEDURE — 97161 PT EVAL LOW COMPLEX 20 MIN: CPT

## 2017-11-28 DEVICE — COMP FEM/KN VANGUARD INTLK CR 75MM NS LT: Type: IMPLANTABLE DEVICE | Site: KNEE | Status: FUNCTIONAL

## 2017-11-28 DEVICE — CAP TOTL KN CMT PREMIUM: Type: IMPLANTABLE DEVICE | Site: KNEE | Status: FUNCTIONAL

## 2017-11-28 DEVICE — STEM TIB PRI FINN 46X40MM: Type: IMPLANTABLE DEVICE | Site: KNEE | Status: FUNCTIONAL

## 2017-11-28 DEVICE — CMT BONE PALACOS 120001: Type: IMPLANTABLE DEVICE | Site: KNEE | Status: FUNCTIONAL

## 2017-11-28 DEVICE — TRY TIB INTERLOK PRI 83MM: Type: IMPLANTABLE DEVICE | Site: KNEE | Status: FUNCTIONAL

## 2017-11-28 DEVICE — PAT 3PEG THN 37X9.6 37MM: Type: IMPLANTABLE DEVICE | Site: KNEE | Status: FUNCTIONAL

## 2017-11-28 DEVICE — BEAR TIB/KN VANGUARD CR DCM 12X79/84MM NS: Type: IMPLANTABLE DEVICE | Site: KNEE | Status: FUNCTIONAL

## 2017-11-28 RX ORDER — SODIUM CHLORIDE 0.9 % (FLUSH) 0.9 %
1-10 SYRINGE (ML) INJECTION AS NEEDED
Status: DISCONTINUED | OUTPATIENT
Start: 2017-11-28 | End: 2017-11-28 | Stop reason: HOSPADM

## 2017-11-28 RX ORDER — ONDANSETRON 4 MG/1
4 TABLET, FILM COATED ORAL EVERY 6 HOURS PRN
Status: DISCONTINUED | OUTPATIENT
Start: 2017-11-28 | End: 2017-12-01 | Stop reason: HOSPADM

## 2017-11-28 RX ORDER — RAMIPRIL 10 MG/1
10 CAPSULE ORAL 2 TIMES DAILY
Status: DISCONTINUED | OUTPATIENT
Start: 2017-11-29 | End: 2017-12-01 | Stop reason: HOSPADM

## 2017-11-28 RX ORDER — LABETALOL HYDROCHLORIDE 5 MG/ML
5 INJECTION, SOLUTION INTRAVENOUS
Status: DISCONTINUED | OUTPATIENT
Start: 2017-11-28 | End: 2017-11-28 | Stop reason: HOSPADM

## 2017-11-28 RX ORDER — CEFAZOLIN SODIUM 2 G/100ML
2 INJECTION, SOLUTION INTRAVENOUS ONCE
Status: COMPLETED | OUTPATIENT
Start: 2017-11-28 | End: 2017-11-28

## 2017-11-28 RX ORDER — PROBENECID 500 MG/1
500 TABLET, FILM COATED ORAL 2 TIMES DAILY
Status: DISCONTINUED | OUTPATIENT
Start: 2017-11-28 | End: 2017-12-01 | Stop reason: HOSPADM

## 2017-11-28 RX ORDER — MORPHINE SULFATE 2 MG/ML
6 INJECTION, SOLUTION INTRAMUSCULAR; INTRAVENOUS
Status: DISCONTINUED | OUTPATIENT
Start: 2017-11-28 | End: 2017-12-01 | Stop reason: HOSPADM

## 2017-11-28 RX ORDER — ROCURONIUM BROMIDE 10 MG/ML
INJECTION, SOLUTION INTRAVENOUS AS NEEDED
Status: DISCONTINUED | OUTPATIENT
Start: 2017-11-28 | End: 2017-11-28 | Stop reason: SURG

## 2017-11-28 RX ORDER — KETOROLAC TROMETHAMINE 30 MG/ML
15 INJECTION, SOLUTION INTRAMUSCULAR; INTRAVENOUS EVERY 8 HOURS PRN
Status: DISPENSED | OUTPATIENT
Start: 2017-11-28 | End: 2017-11-30

## 2017-11-28 RX ORDER — NALOXONE HCL 0.4 MG/ML
0.2 VIAL (ML) INJECTION AS NEEDED
Status: DISCONTINUED | OUTPATIENT
Start: 2017-11-28 | End: 2017-11-28 | Stop reason: HOSPADM

## 2017-11-28 RX ORDER — FENTANYL CITRATE 50 UG/ML
INJECTION, SOLUTION INTRAMUSCULAR; INTRAVENOUS AS NEEDED
Status: DISCONTINUED | OUTPATIENT
Start: 2017-11-28 | End: 2017-11-28 | Stop reason: SURG

## 2017-11-28 RX ORDER — PROMETHAZINE HYDROCHLORIDE 25 MG/1
12.5 TABLET ORAL ONCE AS NEEDED
Status: DISCONTINUED | OUTPATIENT
Start: 2017-11-28 | End: 2017-11-28 | Stop reason: HOSPADM

## 2017-11-28 RX ORDER — ACETAMINOPHEN 500 MG
1000 TABLET ORAL ONCE
Status: COMPLETED | OUTPATIENT
Start: 2017-11-28 | End: 2017-11-28

## 2017-11-28 RX ORDER — SUCCINYLCHOLINE CHLORIDE 20 MG/ML
INJECTION INTRAMUSCULAR; INTRAVENOUS AS NEEDED
Status: DISCONTINUED | OUTPATIENT
Start: 2017-11-28 | End: 2017-11-28 | Stop reason: SURG

## 2017-11-28 RX ORDER — BISACODYL 10 MG
10 SUPPOSITORY, RECTAL RECTAL DAILY PRN
Status: DISCONTINUED | OUTPATIENT
Start: 2017-11-28 | End: 2017-12-01 | Stop reason: HOSPADM

## 2017-11-28 RX ORDER — DIPHENHYDRAMINE HCL 25 MG
50 CAPSULE ORAL EVERY 6 HOURS PRN
Status: DISCONTINUED | OUTPATIENT
Start: 2017-11-28 | End: 2017-12-01 | Stop reason: HOSPADM

## 2017-11-28 RX ORDER — HYDRALAZINE HYDROCHLORIDE 20 MG/ML
5 INJECTION INTRAMUSCULAR; INTRAVENOUS
Status: DISCONTINUED | OUTPATIENT
Start: 2017-11-28 | End: 2017-11-28 | Stop reason: HOSPADM

## 2017-11-28 RX ORDER — PROPOFOL 10 MG/ML
VIAL (ML) INTRAVENOUS AS NEEDED
Status: DISCONTINUED | OUTPATIENT
Start: 2017-11-28 | End: 2017-11-28 | Stop reason: SURG

## 2017-11-28 RX ORDER — GUAIFENESIN 200 MG/1
400 TABLET ORAL 2 TIMES DAILY
Status: DISCONTINUED | OUTPATIENT
Start: 2017-11-28 | End: 2017-12-01 | Stop reason: HOSPADM

## 2017-11-28 RX ORDER — CEFAZOLIN SODIUM 2 G/100ML
INJECTION, SOLUTION INTRAVENOUS AS NEEDED
Status: DISCONTINUED | OUTPATIENT
Start: 2017-11-28 | End: 2017-11-28 | Stop reason: SURG

## 2017-11-28 RX ORDER — PROMETHAZINE HYDROCHLORIDE 25 MG/1
25 TABLET ORAL ONCE AS NEEDED
Status: DISCONTINUED | OUTPATIENT
Start: 2017-11-28 | End: 2017-11-28 | Stop reason: HOSPADM

## 2017-11-28 RX ORDER — OXYCODONE AND ACETAMINOPHEN 7.5; 325 MG/1; MG/1
1 TABLET ORAL ONCE AS NEEDED
Status: DISCONTINUED | OUTPATIENT
Start: 2017-11-28 | End: 2017-11-28 | Stop reason: HOSPADM

## 2017-11-28 RX ORDER — FENTANYL CITRATE 50 UG/ML
50 INJECTION, SOLUTION INTRAMUSCULAR; INTRAVENOUS
Status: DISCONTINUED | OUTPATIENT
Start: 2017-11-28 | End: 2017-11-28 | Stop reason: HOSPADM

## 2017-11-28 RX ORDER — CLINDAMYCIN PHOSPHATE 900 MG/50ML
900 INJECTION INTRAVENOUS EVERY 8 HOURS
Status: COMPLETED | OUTPATIENT
Start: 2017-11-28 | End: 2017-11-29

## 2017-11-28 RX ORDER — EPHEDRINE SULFATE 50 MG/ML
5 INJECTION, SOLUTION INTRAVENOUS ONCE AS NEEDED
Status: DISCONTINUED | OUTPATIENT
Start: 2017-11-28 | End: 2017-11-28 | Stop reason: HOSPADM

## 2017-11-28 RX ORDER — MAGNESIUM HYDROXIDE 1200 MG/15ML
LIQUID ORAL AS NEEDED
Status: DISCONTINUED | OUTPATIENT
Start: 2017-11-28 | End: 2017-11-28 | Stop reason: HOSPADM

## 2017-11-28 RX ORDER — DIPHENHYDRAMINE HYDROCHLORIDE 50 MG/ML
12.5 INJECTION INTRAMUSCULAR; INTRAVENOUS
Status: DISCONTINUED | OUTPATIENT
Start: 2017-11-28 | End: 2017-11-28 | Stop reason: HOSPADM

## 2017-11-28 RX ORDER — SODIUM CHLORIDE, SODIUM LACTATE, POTASSIUM CHLORIDE, CALCIUM CHLORIDE 600; 310; 30; 20 MG/100ML; MG/100ML; MG/100ML; MG/100ML
9 INJECTION, SOLUTION INTRAVENOUS CONTINUOUS PRN
Status: DISCONTINUED | OUTPATIENT
Start: 2017-11-28 | End: 2017-11-28 | Stop reason: HOSPADM

## 2017-11-28 RX ORDER — ONDANSETRON 4 MG/1
4 TABLET, ORALLY DISINTEGRATING ORAL EVERY 6 HOURS PRN
Status: DISCONTINUED | OUTPATIENT
Start: 2017-11-28 | End: 2017-12-01 | Stop reason: HOSPADM

## 2017-11-28 RX ORDER — ATORVASTATIN CALCIUM 10 MG/1
10 TABLET, FILM COATED ORAL EVERY EVENING
Status: DISCONTINUED | OUTPATIENT
Start: 2017-11-28 | End: 2017-12-01 | Stop reason: HOSPADM

## 2017-11-28 RX ORDER — PROMETHAZINE HYDROCHLORIDE 25 MG/1
25 SUPPOSITORY RECTAL ONCE AS NEEDED
Status: DISCONTINUED | OUTPATIENT
Start: 2017-11-28 | End: 2017-11-28 | Stop reason: HOSPADM

## 2017-11-28 RX ORDER — CELECOXIB 200 MG/1
200 CAPSULE ORAL ONCE
Status: COMPLETED | OUTPATIENT
Start: 2017-11-28 | End: 2017-11-28

## 2017-11-28 RX ORDER — TRANEXAMIC ACID 100 MG/ML
INJECTION, SOLUTION INTRAVENOUS AS NEEDED
Status: DISCONTINUED | OUTPATIENT
Start: 2017-11-28 | End: 2017-11-28 | Stop reason: SURG

## 2017-11-28 RX ORDER — ONDANSETRON 2 MG/ML
4 INJECTION INTRAMUSCULAR; INTRAVENOUS EVERY 6 HOURS PRN
Status: DISCONTINUED | OUTPATIENT
Start: 2017-11-28 | End: 2017-12-01 | Stop reason: HOSPADM

## 2017-11-28 RX ORDER — RAMIPRIL 10 MG/1
10 CAPSULE ORAL ONCE
Status: COMPLETED | OUTPATIENT
Start: 2017-11-28 | End: 2017-11-28

## 2017-11-28 RX ORDER — NALOXONE HCL 0.4 MG/ML
0.4 VIAL (ML) INJECTION
Status: DISCONTINUED | OUTPATIENT
Start: 2017-11-28 | End: 2017-12-01 | Stop reason: HOSPADM

## 2017-11-28 RX ORDER — FAMOTIDINE 10 MG/ML
20 INJECTION, SOLUTION INTRAVENOUS
Status: DISCONTINUED | OUTPATIENT
Start: 2017-11-28 | End: 2017-11-28 | Stop reason: HOSPADM

## 2017-11-28 RX ORDER — HYDROCHLOROTHIAZIDE 25 MG/1
25 TABLET ORAL DAILY
Status: DISCONTINUED | OUTPATIENT
Start: 2017-11-29 | End: 2017-12-01 | Stop reason: HOSPADM

## 2017-11-28 RX ORDER — OXYCODONE HYDROCHLORIDE AND ACETAMINOPHEN 5; 325 MG/1; MG/1
2 TABLET ORAL EVERY 4 HOURS PRN
Status: DISCONTINUED | OUTPATIENT
Start: 2017-11-28 | End: 2017-12-01 | Stop reason: HOSPADM

## 2017-11-28 RX ORDER — ATENOLOL 25 MG/1
25 TABLET ORAL 2 TIMES DAILY
Status: DISCONTINUED | OUTPATIENT
Start: 2017-11-28 | End: 2017-12-01 | Stop reason: HOSPADM

## 2017-11-28 RX ORDER — SODIUM CHLORIDE 0.9 % (FLUSH) 0.9 %
1-10 SYRINGE (ML) INJECTION AS NEEDED
Status: DISCONTINUED | OUTPATIENT
Start: 2017-11-28 | End: 2017-12-01 | Stop reason: HOSPADM

## 2017-11-28 RX ORDER — ONDANSETRON 2 MG/ML
INJECTION INTRAMUSCULAR; INTRAVENOUS AS NEEDED
Status: DISCONTINUED | OUTPATIENT
Start: 2017-11-28 | End: 2017-11-28 | Stop reason: SURG

## 2017-11-28 RX ORDER — UREA 10 %
1 LOTION (ML) TOPICAL NIGHTLY PRN
Status: DISCONTINUED | OUTPATIENT
Start: 2017-11-28 | End: 2017-12-01 | Stop reason: HOSPADM

## 2017-11-28 RX ORDER — DOCUSATE SODIUM 100 MG/1
100 CAPSULE, LIQUID FILLED ORAL 2 TIMES DAILY PRN
Status: DISCONTINUED | OUTPATIENT
Start: 2017-11-28 | End: 2017-12-01 | Stop reason: HOSPADM

## 2017-11-28 RX ORDER — HYDROMORPHONE HYDROCHLORIDE 1 MG/ML
0.5 INJECTION, SOLUTION INTRAMUSCULAR; INTRAVENOUS; SUBCUTANEOUS
Status: DISCONTINUED | OUTPATIENT
Start: 2017-11-28 | End: 2017-11-28 | Stop reason: HOSPADM

## 2017-11-28 RX ORDER — SENNA AND DOCUSATE SODIUM 50; 8.6 MG/1; MG/1
2 TABLET, FILM COATED ORAL 2 TIMES DAILY
Status: DISCONTINUED | OUTPATIENT
Start: 2017-11-28 | End: 2017-12-01 | Stop reason: HOSPADM

## 2017-11-28 RX ORDER — DIAZEPAM 5 MG/ML
5 INJECTION, SOLUTION INTRAMUSCULAR; INTRAVENOUS 2 TIMES DAILY PRN
Status: ACTIVE | OUTPATIENT
Start: 2017-11-28 | End: 2017-11-29

## 2017-11-28 RX ORDER — FLUTICASONE PROPIONATE 50 MCG
2 SPRAY, SUSPENSION (ML) NASAL DAILY PRN
Status: DISCONTINUED | OUTPATIENT
Start: 2017-11-28 | End: 2017-12-01 | Stop reason: HOSPADM

## 2017-11-28 RX ORDER — SODIUM CHLORIDE 450 MG/100ML
100 INJECTION, SOLUTION INTRAVENOUS CONTINUOUS
Status: DISCONTINUED | OUTPATIENT
Start: 2017-11-28 | End: 2017-12-01 | Stop reason: HOSPADM

## 2017-11-28 RX ORDER — HYDROCODONE BITARTRATE AND ACETAMINOPHEN 7.5; 325 MG/1; MG/1
1 TABLET ORAL ONCE AS NEEDED
Status: DISCONTINUED | OUTPATIENT
Start: 2017-11-28 | End: 2017-11-28 | Stop reason: HOSPADM

## 2017-11-28 RX ORDER — LIDOCAINE HYDROCHLORIDE 20 MG/ML
INJECTION, SOLUTION INFILTRATION; PERINEURAL AS NEEDED
Status: DISCONTINUED | OUTPATIENT
Start: 2017-11-28 | End: 2017-11-28 | Stop reason: SURG

## 2017-11-28 RX ORDER — PROMETHAZINE HYDROCHLORIDE 25 MG/ML
12.5 INJECTION, SOLUTION INTRAMUSCULAR; INTRAVENOUS ONCE AS NEEDED
Status: DISCONTINUED | OUTPATIENT
Start: 2017-11-28 | End: 2017-11-28 | Stop reason: HOSPADM

## 2017-11-28 RX ORDER — MIDAZOLAM HYDROCHLORIDE 1 MG/ML
2 INJECTION INTRAMUSCULAR; INTRAVENOUS
Status: DISCONTINUED | OUTPATIENT
Start: 2017-11-28 | End: 2017-11-28 | Stop reason: HOSPADM

## 2017-11-28 RX ORDER — OXYCODONE HYDROCHLORIDE AND ACETAMINOPHEN 5; 325 MG/1; MG/1
1 TABLET ORAL EVERY 4 HOURS PRN
Status: DISCONTINUED | OUTPATIENT
Start: 2017-11-28 | End: 2017-12-01 | Stop reason: HOSPADM

## 2017-11-28 RX ORDER — FLUMAZENIL 0.1 MG/ML
0.2 INJECTION INTRAVENOUS AS NEEDED
Status: DISCONTINUED | OUTPATIENT
Start: 2017-11-28 | End: 2017-11-28 | Stop reason: HOSPADM

## 2017-11-28 RX ORDER — DIAZEPAM 5 MG/1
5 TABLET ORAL 2 TIMES DAILY PRN
Status: DISCONTINUED | OUTPATIENT
Start: 2017-11-28 | End: 2017-12-01 | Stop reason: HOSPADM

## 2017-11-28 RX ORDER — ONDANSETRON 2 MG/ML
4 INJECTION INTRAMUSCULAR; INTRAVENOUS ONCE AS NEEDED
Status: DISCONTINUED | OUTPATIENT
Start: 2017-11-28 | End: 2017-11-28 | Stop reason: HOSPADM

## 2017-11-28 RX ORDER — BUPIVACAINE HYDROCHLORIDE AND EPINEPHRINE 5; 5 MG/ML; UG/ML
INJECTION, SOLUTION EPIDURAL; INTRACAUDAL; PERINEURAL AS NEEDED
Status: DISCONTINUED | OUTPATIENT
Start: 2017-11-28 | End: 2017-11-28 | Stop reason: SURG

## 2017-11-28 RX ORDER — ACETAMINOPHEN 325 MG/1
650 TABLET ORAL EVERY 4 HOURS PRN
Status: DISCONTINUED | OUTPATIENT
Start: 2017-11-28 | End: 2017-12-01 | Stop reason: HOSPADM

## 2017-11-28 RX ORDER — ACETAMINOPHEN 325 MG/1
325 TABLET ORAL EVERY 4 HOURS PRN
Status: DISCONTINUED | OUTPATIENT
Start: 2017-11-28 | End: 2017-12-01 | Stop reason: HOSPADM

## 2017-11-28 RX ORDER — MIDAZOLAM HYDROCHLORIDE 1 MG/ML
1 INJECTION INTRAMUSCULAR; INTRAVENOUS
Status: DISCONTINUED | OUTPATIENT
Start: 2017-11-28 | End: 2017-11-28 | Stop reason: HOSPADM

## 2017-11-28 RX ORDER — FERROUS SULFATE 325(65) MG
325 TABLET ORAL
Status: DISCONTINUED | OUTPATIENT
Start: 2017-11-29 | End: 2017-12-01 | Stop reason: HOSPADM

## 2017-11-28 RX ORDER — ASPIRIN 325 MG
325 TABLET, DELAYED RELEASE (ENTERIC COATED) ORAL 2 TIMES DAILY WITH MEALS
Status: DISCONTINUED | OUTPATIENT
Start: 2017-11-28 | End: 2017-12-01 | Stop reason: HOSPADM

## 2017-11-28 RX ADMIN — HYDROMORPHONE HYDROCHLORIDE 0.5 MG: 2 INJECTION INTRAMUSCULAR; INTRAVENOUS; SUBCUTANEOUS at 14:29

## 2017-11-28 RX ADMIN — ATORVASTATIN CALCIUM 10 MG: 10 TABLET, FILM COATED ORAL at 20:44

## 2017-11-28 RX ADMIN — ACETAMINOPHEN 1000 MG: 500 TABLET ORAL at 09:43

## 2017-11-28 RX ADMIN — RAMIPRIL 10 MG: 10 CAPSULE ORAL at 22:40

## 2017-11-28 RX ADMIN — CLINDAMYCIN PHOSPHATE 900 MG: 18 INJECTION, SOLUTION INTRAMUSCULAR; INTRAVENOUS at 20:43

## 2017-11-28 RX ADMIN — FENTANYL CITRATE 50 MCG: 50 INJECTION, SOLUTION INTRAMUSCULAR; INTRAVENOUS at 12:41

## 2017-11-28 RX ADMIN — FENTANYL CITRATE 50 MCG: 50 INJECTION, SOLUTION INTRAMUSCULAR; INTRAVENOUS at 13:06

## 2017-11-28 RX ADMIN — BUPIVACAINE HYDROCHLORIDE AND EPINEPHRINE BITARTRATE 20 ML: 5; .0091 INJECTION, SOLUTION EPIDURAL; INTRACAUDAL; PERINEURAL at 10:58

## 2017-11-28 RX ADMIN — GUAIFENESIN 400 MG: 200 TABLET ORAL at 20:44

## 2017-11-28 RX ADMIN — FENTANYL CITRATE 50 MCG: 50 INJECTION, SOLUTION INTRAMUSCULAR; INTRAVENOUS at 14:40

## 2017-11-28 RX ADMIN — FENTANYL CITRATE 50 MCG: 50 INJECTION, SOLUTION INTRAMUSCULAR; INTRAVENOUS at 12:06

## 2017-11-28 RX ADMIN — SODIUM CHLORIDE, POTASSIUM CHLORIDE, SODIUM LACTATE AND CALCIUM CHLORIDE 9 ML/HR: 600; 310; 30; 20 INJECTION, SOLUTION INTRAVENOUS at 09:30

## 2017-11-28 RX ADMIN — MIDAZOLAM 2 MG: 1 INJECTION INTRAMUSCULAR; INTRAVENOUS at 10:53

## 2017-11-28 RX ADMIN — PROPOFOL 150 MG: 10 INJECTION, EMULSION INTRAVENOUS at 12:06

## 2017-11-28 RX ADMIN — MIDAZOLAM 2 MG: 1 INJECTION INTRAMUSCULAR; INTRAVENOUS at 11:47

## 2017-11-28 RX ADMIN — LIDOCAINE HYDROCHLORIDE 100 MG: 20 INJECTION, SOLUTION INFILTRATION; PERINEURAL at 12:06

## 2017-11-28 RX ADMIN — SODIUM CHLORIDE, POTASSIUM CHLORIDE, SODIUM LACTATE AND CALCIUM CHLORIDE: 600; 310; 30; 20 INJECTION, SOLUTION INTRAVENOUS at 13:00

## 2017-11-28 RX ADMIN — ATENOLOL 25 MG: 25 TABLET ORAL at 20:44

## 2017-11-28 RX ADMIN — OXYCODONE HYDROCHLORIDE AND ACETAMINOPHEN 1 TABLET: 5; 325 TABLET ORAL at 17:48

## 2017-11-28 RX ADMIN — FENTANYL CITRATE 50 MCG: 50 INJECTION, SOLUTION INTRAMUSCULAR; INTRAVENOUS at 12:35

## 2017-11-28 RX ADMIN — DOCUSATE SODIUM -SENNOSIDES 2 TABLET: 50; 8.6 TABLET, COATED ORAL at 17:48

## 2017-11-28 RX ADMIN — FAMOTIDINE 20 MG: 10 INJECTION, SOLUTION INTRAVENOUS at 10:47

## 2017-11-28 RX ADMIN — HYDROMORPHONE HYDROCHLORIDE 0.5 MG: 2 INJECTION INTRAMUSCULAR; INTRAVENOUS; SUBCUTANEOUS at 14:18

## 2017-11-28 RX ADMIN — PROBENECID 500 MG: 500 TABLET, FILM COATED ORAL at 20:43

## 2017-11-28 RX ADMIN — SUCCINYLCHOLINE CHLORIDE 140 MG: 20 INJECTION, SOLUTION INTRAMUSCULAR; INTRAVENOUS; PARENTERAL at 12:06

## 2017-11-28 RX ADMIN — CEFAZOLIN SODIUM 2 G: 2 INJECTION, SOLUTION INTRAVENOUS at 12:00

## 2017-11-28 RX ADMIN — FENTANYL CITRATE 50 MCG: 50 INJECTION, SOLUTION INTRAMUSCULAR; INTRAVENOUS at 14:15

## 2017-11-28 RX ADMIN — ROCURONIUM BROMIDE 5 MG: 10 INJECTION INTRAVENOUS at 12:06

## 2017-11-28 RX ADMIN — CELECOXIB 200 MG: 200 CAPSULE ORAL at 09:43

## 2017-11-28 RX ADMIN — ONDANSETRON 4 MG: 2 INJECTION INTRAMUSCULAR; INTRAVENOUS at 13:49

## 2017-11-28 RX ADMIN — FENTANYL CITRATE 50 MCG: 50 INJECTION, SOLUTION INTRAMUSCULAR; INTRAVENOUS at 13:57

## 2017-11-28 RX ADMIN — ASPIRIN 325 MG: 325 TABLET, DELAYED RELEASE ORAL at 17:48

## 2017-11-28 RX ADMIN — OXYCODONE HYDROCHLORIDE AND ACETAMINOPHEN 1 TABLET: 5; 325 TABLET ORAL at 22:08

## 2017-11-28 RX ADMIN — SUGAMMADEX 400 MG: 100 INJECTION, SOLUTION INTRAVENOUS at 13:49

## 2017-11-28 RX ADMIN — ROCURONIUM BROMIDE 35 MG: 10 INJECTION INTRAVENOUS at 12:17

## 2017-11-28 RX ADMIN — TRANEXAMIC ACID 1000 MG: 100 INJECTION, SOLUTION INTRAVENOUS at 13:27

## 2017-11-28 RX ADMIN — CEFAZOLIN SODIUM 2 G: 2 INJECTION, SOLUTION INTRAVENOUS at 13:40

## 2017-11-28 RX ADMIN — SODIUM CHLORIDE 100 ML/HR: 4.5 INJECTION, SOLUTION INTRAVENOUS at 20:43

## 2017-11-28 NOTE — ANESTHESIA POSTPROCEDURE EVALUATION
Patient: Chaitanya Montesinos    Procedure Summary     Date Anesthesia Start Anesthesia Stop Room / Location    11/28/17 1200 1407  [facility] /  [facility]       Procedure Diagnosis Surgeon Provider    LT TOTAL KNEE ARTHROPLASTY (Left Knee) No diagnosis on file. MD Bharathi Choudhury MD          Anesthesia Type: general  Last vitals  BP   144/92 (11/28/17 1445)   Temp   36.9 °C (98.5 °F) (11/28/17 1403)   Pulse   77 (11/28/17 1445)   Resp   14 (11/28/17 1445)     SpO2   94 % (11/28/17 1445)     Post Anesthesia Care and Evaluation    Patient location during evaluation: PACU  Patient participation: complete - patient participated  Level of consciousness: awake  Pain management: adequate  Airway patency: patent  Anesthetic complications: No anesthetic complications    Cardiovascular status: acceptable  Respiratory status: acceptable  Hydration status: acceptable    Comments: /92  Pulse 77  Temp 36.9 °C (98.5 °F) (Oral)   Resp 14  SpO2 94%

## 2017-11-28 NOTE — ANESTHESIA PROCEDURE NOTES
Airway  Urgency: elective    Difficult airway    General Information and Staff    Patient location during procedure: OR  Anesthesiologist: RED SHEA  CRNA: CHRISTIE CORDOVA    Indications and Patient Condition  Indications for airway management: airway protection    Preoxygenated: yes  MILS not maintained throughout  Mask difficulty assessment: 1 - vent by mask    Final Airway Details  Final airway type: endotracheal airway      Successful airway: ETT  Cuffed: yes   Successful intubation technique: video laryngoscopy  Facilitating devices/methods: intubating stylet  Endotracheal tube insertion site: oral  Blade: CMAC  Blade size: #4  ETT size: 8.0 mm  Cormack-Lehane Classification: grade I - full view of glottis  Placement verified by: chest auscultation and capnometry   Cuff volume (mL): 10  Measured from: lips  ETT to lips (cm): 24  Number of attempts at approach: 1    Additional Comments  PreO2, SIVI, easy BMV, ett placed X 1 attempt with glide scope, appears atraumatic, dentition intact

## 2017-11-28 NOTE — ANESTHESIA PROCEDURE NOTES
Peripheral Block    Patient location during procedure: holding area  Reason for block: at surgeon's request and post-op pain management  Performed by  Anesthesiologist: MARY NGUYEN  Preanesthetic Checklist  Completed: patient identified, site marked, surgical consent, pre-op evaluation, timeout performed, IV checked, risks and benefits discussed and monitors and equipment checked  Prep:  Pt Position: supine  Sterile barriers:gloves  Prep: ChloraPrep  Patient monitoring: blood pressure monitoring, continuous pulse oximetry and EKG  Procedure  Sedation:yes    Guidance:ultrasound guided  ULTRASOUND INTERPRETATION.  Using ultrasound guidance a 21 G gauge needle was placed in close proximity to the femoral nerve, at which point, under ultrasound guidance anesthetic was injected in the area of the nerve and spread of the anesthesia was seen on ultrasound in close proximity thereto.  There were no abnormalities seen on ultrasound; a digital image was taken; and the patient tolerated the procedure with no complications. Images:still images obtained    Laterality:left  Block Type:adductor canal block  Injection Technique:single-shot  Medications  Local Injected:bupivacaine 0.5% with epinephrine Local Amount Injected:20mL

## 2017-11-28 NOTE — ANESTHESIA PREPROCEDURE EVALUATION
Anesthesia Evaluation     Patient summary reviewed   history of anesthetic complications: difficult airway         Airway   Mallampati: II  Neck ROM: full  possible difficult intubation  Dental      Pulmonary    Cardiovascular     ECG reviewed  Rhythm: regular    (+) hypertension,       Neuro/Psych  GI/Hepatic/Renal/Endo    (+) obesity,      Musculoskeletal     Abdominal    Substance History      OB/GYN          Other                                        Anesthesia Plan    ASA 3     general     intravenous induction   Anesthetic plan and risks discussed with patient.  Use of blood products discussed with patient .

## 2017-11-29 LAB
ANION GAP SERPL CALCULATED.3IONS-SCNC: 12.2 MMOL/L
BUN BLD-MCNC: 22 MG/DL (ref 8–23)
BUN/CREAT SERPL: 19.1 (ref 7–25)
CALCIUM SPEC-SCNC: 8 MG/DL (ref 8.6–10.5)
CHLORIDE SERPL-SCNC: 100 MMOL/L (ref 98–107)
CO2 SERPL-SCNC: 24.8 MMOL/L (ref 22–29)
CREAT BLD-MCNC: 1.15 MG/DL (ref 0.76–1.27)
GFR SERPL CREATININE-BSD FRML MDRD: 62 ML/MIN/1.73
GLUCOSE BLD-MCNC: 131 MG/DL (ref 65–99)
HCT VFR BLD AUTO: 42.2 % (ref 40.4–52.2)
HGB BLD-MCNC: 14 G/DL (ref 13.7–17.6)
POTASSIUM BLD-SCNC: 3.8 MMOL/L (ref 3.5–5.2)
SODIUM BLD-SCNC: 137 MMOL/L (ref 136–145)

## 2017-11-29 PROCEDURE — 25010000002 KETOROLAC TROMETHAMINE PER 15 MG: Performed by: ORTHOPAEDIC SURGERY

## 2017-11-29 PROCEDURE — 85014 HEMATOCRIT: CPT | Performed by: ORTHOPAEDIC SURGERY

## 2017-11-29 PROCEDURE — 97150 GROUP THERAPEUTIC PROCEDURES: CPT

## 2017-11-29 PROCEDURE — 85018 HEMOGLOBIN: CPT | Performed by: ORTHOPAEDIC SURGERY

## 2017-11-29 PROCEDURE — 80048 BASIC METABOLIC PNL TOTAL CA: CPT | Performed by: ORTHOPAEDIC SURGERY

## 2017-11-29 PROCEDURE — 97110 THERAPEUTIC EXERCISES: CPT

## 2017-11-29 RX ADMIN — OXYCODONE HYDROCHLORIDE AND ACETAMINOPHEN 2 TABLET: 5; 325 TABLET ORAL at 10:16

## 2017-11-29 RX ADMIN — RAMIPRIL 10 MG: 10 CAPSULE ORAL at 17:56

## 2017-11-29 RX ADMIN — DOCUSATE SODIUM -SENNOSIDES 2 TABLET: 50; 8.6 TABLET, COATED ORAL at 08:26

## 2017-11-29 RX ADMIN — OXYCODONE HYDROCHLORIDE AND ACETAMINOPHEN 2 TABLET: 5; 325 TABLET ORAL at 06:28

## 2017-11-29 RX ADMIN — PROBENECID 500 MG: 500 TABLET, FILM COATED ORAL at 08:27

## 2017-11-29 RX ADMIN — OXYCODONE HYDROCHLORIDE AND ACETAMINOPHEN 2 TABLET: 5; 325 TABLET ORAL at 23:07

## 2017-11-29 RX ADMIN — OXYCODONE HYDROCHLORIDE AND ACETAMINOPHEN 2 TABLET: 5; 325 TABLET ORAL at 19:32

## 2017-11-29 RX ADMIN — ATENOLOL 25 MG: 25 TABLET ORAL at 17:56

## 2017-11-29 RX ADMIN — MUPIROCIN: 20 OINTMENT TOPICAL at 08:26

## 2017-11-29 RX ADMIN — KETOROLAC TROMETHAMINE 15 MG: 30 INJECTION, SOLUTION INTRAMUSCULAR at 13:46

## 2017-11-29 RX ADMIN — ATENOLOL 25 MG: 25 TABLET ORAL at 08:27

## 2017-11-29 RX ADMIN — FERROUS SULFATE TAB 325 MG (65 MG ELEMENTAL FE) 325 MG: 325 (65 FE) TAB at 08:27

## 2017-11-29 RX ADMIN — GUAIFENESIN 400 MG: 200 TABLET ORAL at 08:26

## 2017-11-29 RX ADMIN — RAMIPRIL 10 MG: 10 CAPSULE ORAL at 08:26

## 2017-11-29 RX ADMIN — OXYCODONE HYDROCHLORIDE AND ACETAMINOPHEN 2 TABLET: 5; 325 TABLET ORAL at 13:46

## 2017-11-29 RX ADMIN — ASPIRIN 325 MG: 325 TABLET, DELAYED RELEASE ORAL at 08:27

## 2017-11-29 RX ADMIN — HYDROCHLOROTHIAZIDE 25 MG: 25 TABLET ORAL at 08:26

## 2017-11-29 RX ADMIN — GUAIFENESIN 400 MG: 200 TABLET ORAL at 17:56

## 2017-11-29 RX ADMIN — ASPIRIN 325 MG: 325 TABLET, DELAYED RELEASE ORAL at 17:56

## 2017-11-29 RX ADMIN — CLINDAMYCIN PHOSPHATE 900 MG: 18 INJECTION, SOLUTION INTRAMUSCULAR; INTRAVENOUS at 07:50

## 2017-11-29 RX ADMIN — OXYCODONE HYDROCHLORIDE AND ACETAMINOPHEN 1 TABLET: 5; 325 TABLET ORAL at 02:24

## 2017-11-29 RX ADMIN — PROBENECID 500 MG: 500 TABLET, FILM COATED ORAL at 17:56

## 2017-11-29 RX ADMIN — MUPIROCIN: 20 OINTMENT TOPICAL at 17:56

## 2017-11-29 RX ADMIN — DOCUSATE SODIUM -SENNOSIDES 2 TABLET: 50; 8.6 TABLET, COATED ORAL at 17:56

## 2017-11-29 RX ADMIN — ATORVASTATIN CALCIUM 10 MG: 10 TABLET, FILM COATED ORAL at 17:56

## 2017-11-30 LAB
HCT VFR BLD AUTO: 39.9 % (ref 40.4–52.2)
HGB BLD-MCNC: 13.2 G/DL (ref 13.7–17.6)

## 2017-11-30 PROCEDURE — 97110 THERAPEUTIC EXERCISES: CPT

## 2017-11-30 PROCEDURE — 85018 HEMOGLOBIN: CPT | Performed by: ORTHOPAEDIC SURGERY

## 2017-11-30 PROCEDURE — 97150 GROUP THERAPEUTIC PROCEDURES: CPT

## 2017-11-30 PROCEDURE — 85014 HEMATOCRIT: CPT | Performed by: ORTHOPAEDIC SURGERY

## 2017-11-30 RX ADMIN — ATENOLOL 25 MG: 25 TABLET ORAL at 17:39

## 2017-11-30 RX ADMIN — RAMIPRIL 10 MG: 10 CAPSULE ORAL at 17:39

## 2017-11-30 RX ADMIN — GUAIFENESIN 400 MG: 200 TABLET ORAL at 17:39

## 2017-11-30 RX ADMIN — OXYCODONE HYDROCHLORIDE AND ACETAMINOPHEN 2 TABLET: 5; 325 TABLET ORAL at 12:44

## 2017-11-30 RX ADMIN — PROBENECID 500 MG: 500 TABLET, FILM COATED ORAL at 17:39

## 2017-11-30 RX ADMIN — OXYCODONE HYDROCHLORIDE AND ACETAMINOPHEN 2 TABLET: 5; 325 TABLET ORAL at 21:44

## 2017-11-30 RX ADMIN — ATORVASTATIN CALCIUM 10 MG: 10 TABLET, FILM COATED ORAL at 17:39

## 2017-11-30 RX ADMIN — ASPIRIN 325 MG: 325 TABLET, DELAYED RELEASE ORAL at 08:37

## 2017-11-30 RX ADMIN — PROBENECID 500 MG: 500 TABLET, FILM COATED ORAL at 08:36

## 2017-11-30 RX ADMIN — ATENOLOL 25 MG: 25 TABLET ORAL at 08:37

## 2017-11-30 RX ADMIN — DOCUSATE SODIUM -SENNOSIDES 2 TABLET: 50; 8.6 TABLET, COATED ORAL at 17:39

## 2017-11-30 RX ADMIN — FERROUS SULFATE TAB 325 MG (65 MG ELEMENTAL FE) 325 MG: 325 (65 FE) TAB at 08:37

## 2017-11-30 RX ADMIN — MUPIROCIN: 20 OINTMENT TOPICAL at 08:37

## 2017-11-30 RX ADMIN — ASPIRIN 325 MG: 325 TABLET, DELAYED RELEASE ORAL at 17:39

## 2017-11-30 RX ADMIN — MAGNESIUM HYDROXIDE 10 ML: 2400 SUSPENSION ORAL at 20:37

## 2017-11-30 RX ADMIN — DOCUSATE SODIUM -SENNOSIDES 2 TABLET: 50; 8.6 TABLET, COATED ORAL at 08:36

## 2017-11-30 RX ADMIN — GUAIFENESIN 400 MG: 200 TABLET ORAL at 08:37

## 2017-11-30 RX ADMIN — HYDROCHLOROTHIAZIDE 25 MG: 25 TABLET ORAL at 08:36

## 2017-11-30 RX ADMIN — OXYCODONE HYDROCHLORIDE AND ACETAMINOPHEN 2 TABLET: 5; 325 TABLET ORAL at 03:54

## 2017-11-30 RX ADMIN — OXYCODONE HYDROCHLORIDE AND ACETAMINOPHEN 2 TABLET: 5; 325 TABLET ORAL at 08:37

## 2017-11-30 RX ADMIN — RAMIPRIL 10 MG: 10 CAPSULE ORAL at 08:36

## 2017-11-30 RX ADMIN — OXYCODONE HYDROCHLORIDE AND ACETAMINOPHEN 2 TABLET: 5; 325 TABLET ORAL at 17:40

## 2017-12-01 VITALS
SYSTOLIC BLOOD PRESSURE: 130 MMHG | HEIGHT: 77 IN | RESPIRATION RATE: 16 BRPM | HEART RATE: 68 BPM | TEMPERATURE: 97 F | OXYGEN SATURATION: 96 % | WEIGHT: 303 LBS | BODY MASS INDEX: 35.78 KG/M2 | DIASTOLIC BLOOD PRESSURE: 61 MMHG

## 2017-12-01 LAB
HCT VFR BLD AUTO: 39.6 % (ref 40.4–52.2)
HGB BLD-MCNC: 13 G/DL (ref 13.7–17.6)

## 2017-12-01 PROCEDURE — 97150 GROUP THERAPEUTIC PROCEDURES: CPT

## 2017-12-01 PROCEDURE — 85014 HEMATOCRIT: CPT | Performed by: ORTHOPAEDIC SURGERY

## 2017-12-01 PROCEDURE — 97110 THERAPEUTIC EXERCISES: CPT

## 2017-12-01 PROCEDURE — 85018 HEMOGLOBIN: CPT | Performed by: ORTHOPAEDIC SURGERY

## 2017-12-01 RX ORDER — OXYCODONE HYDROCHLORIDE AND ACETAMINOPHEN 5; 325 MG/1; MG/1
1-2 TABLET ORAL EVERY 4 HOURS PRN
Qty: 80 TABLET | Refills: 0 | Status: SHIPPED | OUTPATIENT
Start: 2017-12-01 | End: 2022-09-09 | Stop reason: HOSPADM

## 2017-12-01 RX ORDER — PSEUDOEPHEDRINE HCL 30 MG
100 TABLET ORAL 2 TIMES DAILY PRN
Qty: 40 CAPSULE | Refills: 1 | Status: SHIPPED | OUTPATIENT
Start: 2017-12-01

## 2017-12-01 RX ADMIN — ATENOLOL 25 MG: 25 TABLET ORAL at 08:25

## 2017-12-01 RX ADMIN — FERROUS SULFATE TAB 325 MG (65 MG ELEMENTAL FE) 325 MG: 325 (65 FE) TAB at 08:25

## 2017-12-01 RX ADMIN — DOCUSATE SODIUM 100 MG: 100 CAPSULE, LIQUID FILLED ORAL at 02:31

## 2017-12-01 RX ADMIN — HYDROCHLOROTHIAZIDE 25 MG: 25 TABLET ORAL at 08:25

## 2017-12-01 RX ADMIN — GUAIFENESIN 400 MG: 200 TABLET ORAL at 08:25

## 2017-12-01 RX ADMIN — RAMIPRIL 10 MG: 10 CAPSULE ORAL at 08:25

## 2017-12-01 RX ADMIN — DOCUSATE SODIUM -SENNOSIDES 2 TABLET: 50; 8.6 TABLET, COATED ORAL at 08:25

## 2017-12-01 RX ADMIN — OXYCODONE HYDROCHLORIDE AND ACETAMINOPHEN 2 TABLET: 5; 325 TABLET ORAL at 02:31

## 2017-12-01 RX ADMIN — PROBENECID 500 MG: 500 TABLET, FILM COATED ORAL at 08:25

## 2017-12-01 RX ADMIN — OXYCODONE HYDROCHLORIDE AND ACETAMINOPHEN 2 TABLET: 5; 325 TABLET ORAL at 07:33

## 2017-12-01 RX ADMIN — ASPIRIN 325 MG: 325 TABLET, DELAYED RELEASE ORAL at 08:25

## 2017-12-01 RX ADMIN — OXYCODONE HYDROCHLORIDE AND ACETAMINOPHEN 1 TABLET: 5; 325 TABLET ORAL at 13:32

## 2017-12-01 NOTE — DISCHARGE SUMMARY
Discharge Summary    Patient Name:  Chaitanya Montesinos  YOB: 1945  Medical Records Number:  [id number]    Date of Admission:  11/28/2017  Date of Discharge:  12/1/2017    Primary Discharge Diagnosis:  OA (osteoarthritis) of knee [M17.10]    Secondary Discharge Diagnosis:    Problem List Items Addressed This Visit     None          Procedures Performed:  Left Total Knee Arthroplasty      Hospital Course:    Chaitanya Montesinos is a 72 y.o.  male who underwent successful left tka on 11/28/2017.  Chaitanya Montesinos was started on Aspirin 325mg po twice daily immediately post-operatively for DVT prophylaxis.  On post-op day 1 the patients dressing was changed and their incision was clean, with no signs of infection and their calf was soft, with no signs of DVT.  The patient progressed well with physical therapy and the patients hemoglobin remained stable. On post-operative day 3 the patient was felt ready for discharge.      Total Knee Joint Replacement Discharge Instructions:    I. ACTIVITIES:  1. Exercises:  ? Complete exercise program as taught by the hospital physical therapist 2 times per day  ? Exercise program will be advanced by the physical therapist  ? During the day be up ambulating every 2 hours (while awake) for short distances  ? Complete the ankle pump exercises at least 10 times per hour (while awake)  ? Elevate legs most of the day the first week post operatively and thereafter elevate legs when in bed and for at least 30 minutes during the day. Caution must be taken to avoid pillow placement under the bend of the knee as this can led to flexion contractures of the knee.  ? Use cold packs 20-30 minutes approximately 5 times per day. This should be done before and after completing your exercises and at any time you are experiencing pain/ stiffness in your operative extremity.      2. Activities of Daily Living:  ? No tub baths, hot tubs, or swimming pools for 4 weeks  ? May shower and let water run over the  incision on post-operative day #5 if no drainage. Do not scrub or rub the incision. Simply let the water run over the incision and pat dry.    II. Restrictions  ? Do not cross legs or kneel  ? Your surgeon will discuss with you when you will be able to drive again.  ? Weight bearing is as tolerated  ? First week stay inside on even terrain. May go up and down stairs one stair at a time utilizing the hand rail  ? After one week, you may venture outside.    III. Precautions:  ? Everyone that comes near you should wash their hands  ? No elective dental, genital-urinary, or colon procedures or surgical procedures for 12 weeks after surgery unless absolutely necessary.  ?  If dental work or surgical procedure is deemed absolutely necessary, you will need to contact your surgeon as you will need to take antibiotics 1 hour prior to any dental work (including teeth cleanings).  ? Please discuss with your surgeon prophylactic antibiotics as the length of time this intervention will be necessary for you varies with each patient’s health history and situation.  ? Avoid sick people. If you must be around someone who is ill, they should wear a mask.  ? Avoid visits to the Emergency Room or Urgent Care unless you are having a life threatening event.   ? If ordered stockings are to be placed on in the morning and removed at night. Monitor the stockings to ensure that any swelling is not causing the stockings to become too tight. In this case, remove stockings immediately.    IV. INCISION CARE:  ? Wash your hands prior to dressing changes  ? Change the dressing as needed to keep incision clean and dry. Utilize dry gauze and paper tape. Avoid touching the side of the gauze that goes against the incision with your hands.  ? No creams or ointments to the incision  ? May remove dressing once the incision is free of drainage  ? Do not touch or pick at the incision  ? Check incision every day and notify surgeon immediately if any of the  following signs or symptoms are noted:  o Increase in redness  o Increase in swelling around the incision and of the entire extremity  o Increase in pain  o Drainage oozing from the incision  o Pulling apart of the edges of the incision  o Increase in overall body temperature (greater than 100.5 degrees)  ? Your surgeon will instruct you regarding suture or staple removal    V. Medications:   1. Anticoagulants: You will be discharged on an anticoagulant. This is a prophylactic medication that helps prevent blood clots during your post-operative period. The type and length of dosage varies based on your individual needs, procedure performed, and surgeon’s preference.  ? While taking the anticoagulant, you should avoid taking any additional aspirin, ibuprofen (Advil or Motrin), Aleve (Naprosyn) or other non-steroidal anti-inflammatory medications.   ? Notify surgeon immediately if any lucas bleeding is noted in the urine, stool, emesis, or from the nose or the incision. Blood in the stool will often appear as black rather than red. Blood in urine may appear as pink. Blood in emesis may appear as brown/black like coffee grounds.  ? You will need to apply pressure for longer periods of time to any cuts or abrasions to stop bleeding  ? Avoid alcohol while taking anticoagulants    2. Stool Softeners: You will be at greater risk of constipation after surgery due to being less mobile and the pain medications.   ? Take stool softeners as instructed by your surgeon while on pain medications. Over the counter Colace 100 mg 1-2 capsules twice daily.   ? If stools become too loose or too frequent, please decreases the dosage or stop the stool softener.  ? If constipation occurs despite use of stool softeners, you are to continue the stool softeners and add a laxative (Milk of Magnesia 1 ounce daily as needed)  ? Drink plenty of fluids, and eat fruits and vegetables during your recovery time    3. Pain Medications utilized after  surgery are narcotics and the law requires that the following information be given to all patients that are prescribed narcotics:  ? CLASSIFICATION: Pain medications are called Opioids and are narcotics  ? LEGALITIES: It is illegal to share narcotics with others and to drive within 24 hours of taking narcotics  ? POTENTIAL SIDE EFFECTS: Potential side effects of opioids include: nausea, vomiting, itching, dizziness, drowsiness, dry mouth, constipation, and difficulty urinating.  ? POTENTIAL ADVERSE EFFECTS:   o Opioid tolerance can develop with use of pain medications and this simply means that it requires more and more of the medication to control pain; however, this is seen more in patients that use opioids for longer periods of time.  o Opioid dependence can develop with use of Opioids and this simply means that to stop the medication can cause withdrawal symptoms; however, this is seen with patients that use Opioids for longer periods of time.  o Opioid addiction can develop with use of Opioids and the incidence of this is very unlikely in patients who take the medications as ordered and stop the medications as instructed.  o Opioid overdose can be dangerous, but is unlikely when the medication is taken as ordered and stopped when ordered. It is important not to mix opioids with alcohol or with and type of sedative such as Benadryl as this can lead to over sedation and respiratory difficulty.  ? DOSAGE:   o Pain medications will need to be taken consistently for the first week to decrease pain and promote adequate pain relief and participation in physical therapy.  o After the initial surgical pain begins to resolve, you may begin to decrease the pain medication. By the end of 6-8 weeks, you should be off of pain medications.  o Refills will not be given by the office during evening hours, on weekends, or after 6-8 weeks post-op.  o To seek refills on pain medications during the initial 6 week post-operative  period, you must call the office 48 hours in advance to request the refill. The office will then notify you when to  the prescription. DO NOT wait until you are out of the medication to request a refill.    V. FOLLOW-UP VISITS:  ? You will need to follow up in the office with your surgeon in 3 weeks. Please call this number [phone] to schedule this appointment.  If you have any concerns or suspected complications prior to your follow up visit, please call your surgeons office. Do not wait until your appointment time if you suspect complications. These will need to be addressed in the office promptly.      Discharge Medications:     1) Percocet 5/325 mg  1-2 po q 4-6 hours for pain control  2)  Aspirin 325 mg po twice daily for 2 weeks, then once daily for 4 weeks.    CC:No Known Provider:Arvind Jones MD

## 2017-12-01 NOTE — PLAN OF CARE
Problem: Patient Care Overview (Adult)  Goal: Plan of Care Review  Outcome: Outcome(s) achieved Date Met:  12/01/17 12/01/17 3106   Coping/Psychosocial Response Interventions   Plan Of Care Reviewed With patient;spouse;daughter   Patient Care Overview   Progress improving   Outcome Evaluation   Outcome Summary/Follow up Plan Pt pain well controlled on oral meds, ambulating with st by assist and walker, continues with constipation. Encouraged to take prn colace at home on a routine basis to assist with elimination. Discharge teaching completed including need to continue to monitor B/P due to dx of HTN. .        Goal: Adult Individualization and Mutuality  Outcome: Outcome(s) achieved Date Met:  12/01/17  Goal: Discharge Needs Assessment  Outcome: Outcome(s) achieved Date Met:  12/01/17    Problem: Perioperative Period (Adult)  Goal: Signs and Symptoms of Listed Potential Problems Will be Absent or Manageable (Perioperative Period)  Outcome: Outcome(s) achieved Date Met:  12/01/17    Problem: Fall Risk (Adult)  Goal: Absence of Falls  Outcome: Outcome(s) achieved Date Met:  12/01/17

## 2017-12-01 NOTE — PLAN OF CARE
Problem: Inpatient Physical Therapy  Goal: Bed Mobility Goal LTG- PT    12/01/17 1036   Bed Mobility PT LTG   Bed Mobility PT LTG, Outcome goal met       Goal: Transfer Training Goal 1 LTG- PT    12/01/17 1036   Transfer Training PT LTG   Transfer Training PT LTG, Outcome goal met       Goal: Gait Training Goal LTG- PT    12/01/17 1036   Gait Training PT LTG   Gait Training Goal PT LTG, Outcome goal met       Goal: Stair Training Goal LTG- PT    12/01/17 1036   Stair Training PT LTG   Stair Training Goal PT LTG, Outcome goal met       Goal: Range of Motion Goal LTG- PT    12/01/17 1036   Range of Motion PT LTG   Range fo Motion Goal PT LTG, Joint L knee   Range of Motion Goal PT LTG, AROM Measure (10,92)   Range of Motion Goal PT LTG, Outcome goal partially met

## 2017-12-01 NOTE — PROGRESS NOTES
"Ortho POD 3    Patient Name:  Chaitanya Montesinos  YOB: 1945  Medical Records Number:  [id number]    No complaints    /71 (BP Location: Right arm, Patient Position: Lying)  Pulse 62  Temp 98.3 °F (36.8 °C) (Oral)   Resp 16  Ht 77\" (195.6 cm)  Wt (!) 303 lb (137 kg) Comment: see previous charting  SpO2 94%  BMI 35.93 kg/m2    LLE:  NVI, calf nontender, sensation intact  No signs of DVT    Incision: clean, no infection    Lab Results (last 24 hours)     Procedure Component Value Units Date/Time    Hemoglobin & Hematocrit, Blood [[id number]]  (Abnormal) Collected:  12/01/17 0412    Specimen:  Blood Updated:  12/01/17 0450     Hemoglobin 13.0 (L) g/dL      Hematocrit 39.6 (L) %           S/p Left TKA  WBAT with walker  ASA for DVT prophylaxis  Home with Home Health  "

## 2017-12-01 NOTE — PLAN OF CARE
Problem: Patient Care Overview (Adult)  Goal: Plan of Care Review  Outcome: Ongoing (interventions implemented as appropriate)    12/01/17 0338   Coping/Psychosocial Response Interventions   Plan Of Care Reviewed With patient;spouse   Patient Care Overview   Progress improving   Outcome Evaluation   Outcome Summary/Follow up Plan VSS. Co-morbidity of HTN has been stable-pt verbalizes understanding of education related to this. Neurovascular assessments WNL. Up and ambulating with assist X1. Reports pain has been well controlled with PO analgesic. PRN medications given for c/o constipation. Fluid and mobility encourage for this as well. Plans to d/c home this date,         Problem: Knee Replacement, Total (Adult)  Goal: Signs and Symptoms of Listed Potential Problems Will be Absent or Manageable (Knee Replacement, Total)  Outcome: Outcome(s) achieved Date Met:  12/01/17 12/01/17 0338   Knee Replacement, Total   Problems Present (Total Knee Replacement) decreased range of motion;functional decline/self care deficit;pain         Problem: Fall Risk (Adult)  Goal: Absence of Falls  Outcome: Ongoing (interventions implemented as appropriate)

## 2017-12-01 NOTE — THERAPY DISCHARGE NOTE
Acute Care - Physical Therapy Treatment Note/Discharge  [facility]     Patient Name: Chaitanya Montesinos  : 1945  MRN: [id number]  Today's Date: 2017  Onset of Illness/Injury or Date of Surgery Date: 17  Date of Referral to PT: 17  Referring Physician: Arvind Shelton    Admit Date: 2017    Visit Dx:    ICD-10-CM ICD-9-CM   1. Difficulty walking R26.2 719.7     Patient Active Problem List   Diagnosis   • OA (osteoarthritis) of knee       Physical Therapy Education     Title: PT OT SLP Therapies (Done)     Topic: Physical Therapy (Done)     Point: Mobility training (Done)    Learning Progress Summary    Learner Readiness Method Response Comment Documented by Status   Patient Acceptance E,D VU,DU  MS 17 1035 Done    Acceptance E,D VU,NR  MS 17 1138 Done    Acceptance E,TB VU   17 0225 Done    Acceptance E,D VU,NR  MS 17 1626 Done    Acceptance D,E VU,NR  MS 17 1120 Done    Acceptance E,D VU,NR  MS 17 1651 Done   Family Acceptance E,TB VU   17 0225 Done               Point: Home exercise program (Done)    Learning Progress Summary    Learner Readiness Method Response Comment Documented by Status   Patient Acceptance E,D VU,DU  MS 17 1035 Done    Acceptance E,D VU,NR  MS 17 1138 Done    Acceptance E,TB VU   17 0225 Done    Acceptance E,D VU,NR  MS 17 1626 Done    Acceptance D,E VU,NR  MS 17 1120 Done    Acceptance E,D VU,NR  MS 17 1651 Done   Family Acceptance E,TB VU   17 0225 Done               Point: Body mechanics (Done)    Learning Progress Summary    Learner Readiness Method Response Comment Documented by Status   Patient Acceptance E,D VU,DU  MS 17 1035 Done    Acceptance E,D VU,NR  MS 17 1138 Done    Acceptance E,TB VU   17 0225 Done    Acceptance E,D VU,NR  MS 17 1626 Done    Acceptance D,E VU,NR  MS 17 1120 Done    Acceptance E,D VU,NR  MS 17 9991 Done    Family Acceptance E,RISHI VU   11/30/17 0225 Done               Point: Precautions (Done)    Learning Progress Summary    Learner Readiness Method Response Comment Documented by Status   Patient Acceptance E,D VU,DU  MS 12/01/17 1035 Done    Acceptance E,D VU,NR  MS 11/30/17 1138 Done    Acceptance ETB VU   11/30/17 0225 Done    Acceptance E,D VU,NR  MS 11/29/17 1626 Done    Acceptance D,E VU,NR  MS 11/29/17 1120 Done    Acceptance E,D VU,NR  MS 11/28/17 1651 Done   Family Acceptance E,TB VU   11/30/17 0225 Done                      User Key     Initials Effective Dates Name Provider Type Discipline    MS 12/01/15 -  Sammy Manuel, PT Physical Therapist PT     04/28/17 -  Katarina Monzon, RN Registered Nurse Nurse                    IP PT Goals       12/01/17 1036 11/28/17 1653       Bed Mobility PT LTG    Bed Mobility PT LTG, Date Established  11/28/17  -MS     Bed Mobility PT LTG, Time to Achieve  3 days  -MS     Bed Mobility PT LTG, Activity Type  all bed mobility  -MS     Bed Mobility PT LTG, [place] Level  independent  -MS     Bed Mobility PT LTG, Outcome goal met  -MS      Transfer Training PT LTG    Transfer Training PT LTG, Date Established  11/28/17  -MS     Transfer Training PT LTG, Time to Achieve  3 days  -MS     Transfer Training PT LTG, Activity Type  all transfers  -MS     Transfer Training PT LTG, [place] Level  independent  -MS     Transfer Training PT LTG, Assist Device  walker, rolling  -MS     Transfer Training PT LTG, Outcome goal met  -MS      Gait Training PT LTG    Gait Training Goal PT LTG, Date Established  11/28/17  -MS     Gait Training Goal PT LTG, Time to Achieve  3 days  -MS     Gait Training Goal PT LTG, [place] Level  independent  -MS     Gait Training Goal PT LTG, Assist Device  walker, rolling  -MS     Gait Training Goal PT LTG, Distance to Achieve  100 feet  -MS     Gait Training Goal PT LTG, Outcome goal met  -MS      Stair Training PT LTG    Stair  Training Goal PT LTG, Date Established  11/28/17  -MS     Stair Training Goal PT LTG, Time to Achieve  3 days  -MS     Stair Training Goal PT LTG, Number of Steps  2  -MS     Stair Training Goal PT LTG, [place] Level  contact guard assist  -MS     Stair Training Goal PT LTG, Assist Device  1 handrail  -MS     Stair Training Goal PT LTG, Outcome goal met  -MS      Range of Motion PT LTG    Range of Motion Goal PT LTG, Date Established  11/28/17  -MS     Range of Motion Goal PT LTG, Time to Achieve  3 days  -MS     Range fo Motion Goal PT LTG, Joint L knee  -MS L knee  -MS     Range of Motion Goal PT LTG, AROM Measure (10,92)  -MS (5, 85)  -MS     Range of Motion Goal PT LTG, Outcome goal partially met  -MS        User Key  (r) = Recorded By, (t) = Taken By, (c) = Cosigned By    Initials Name Provider Type    MS Sammy Manuel, PT Physical Therapist              Adult Rehabilitation Note       12/01/17 1031 11/30/17 1347 11/30/17 1308    Rehab Assessment/Intervention    Discipline physical therapist  -MS  physical therapy assistant  -    Document Type therapy note (daily note);discharge summary  -MS  therapy note (daily note)  -    Subjective Information agree to therapy;complains of;pain  -MS  agree to therapy;complains of;weakness;fatigue;pain;swelling  -    Patient Effort, Rehab Treatment good  -MS      Symptoms Noted During/After Treatment fatigue  -MS      Precautions/Limitations   fall precautions  -    Recorded by [MS] Sammy Manuel, PT  [JM] Katarina Remy PTA    Pain Assessment    Pain Assessment 0-10  -MS  0-10  -JM    Pain Score 5  -MS  6  -JM    Post Pain Score 5  -MS      Pain Type Acute pain;Surgical pain  -MS  Surgical pain  -JM    Pain Location Knee  -MS  Knee  -JM    Pain Orientation Left  -MS  Left  -JM    Pain Intervention(s) Medication (See MAR);Cold applied;Repositioned;Elevated;Rest  -MS  Medication (See MAR);Repositioned;Cold applied  -JM    Response to Interventions   dali   -JM    Recorded by [MS] Sammy Manuel, PT  [JM] Katarina Remy PTA    Cognitive Assessment/Intervention    Current Cognitive/Communication Assessment functional  -MS --  -JM     Orientation Status oriented x 4  -MS --  -JM     Follows Commands/Answers Questions 100% of the time  -MS      Personal Safety WNL/WFL  -MS      Personal Safety Interventions fall prevention program maintained;gait belt;nonskid shoes/slippers when out of bed;supervised activity  -MS      Recorded by [MS] Sammy Manuel, PT [JM] Katarina Remy PTA     ROM (Range of Motion)    General ROM Detail Left knee AROM (10, 92)  -MS      Recorded by [MS] Sammy Manuel PT      Bed Mobility, Assessment/Treatment    Bed Mob, Sit to Supine, [place] independent  -MS --  -JM     Bed Mobility, Comment   in chair  -JM    Recorded by [MS] Sammy Manuel, PT [JM] Katarina Remy PTA [JM] Katarina Remy PTA    Transfer Assessment/Treatment    Transfers, Sit-Stand [place] independent  -MS --  -JM stand by assist  -    Transfers, Stand-Sit [place] independent  -MS --  -JM stand by assist  -    Transfers, Sit-Stand-Sit, Assist Device rolling walker  -MS --  -JM rolling walker  -JM    Recorded by [MS] Sammy Manuel, PT [JM] Katarina Remy, PTA [JM] Katarina Remy PTA    Gait Assessment/Treatment    Gait, [place] Level independent  -MS --  -JM stand by assist  -    Gait, Assistive Device rolling walker  -MS --  -JM rolling walker  -JM    Gait, Distance (Feet) 120  -MS  120  -JM    Gait, Gait Pattern Analysis swing-through gait  -MS --  -JM swing-through gait  -JM    Gait, Gait Deviations left:;antalgic  -MS      Gait, Comment verbal cues for posture correction.  -MS  extremely fatigued by end of amb, offered chair but pt declined  -JM    Recorded by [MS] Sammy Manuel, PT [JM] Katarina Remy, PTA [JM] Katarina Remy PTA    Stairs Assessment/Treatment    Number of Stairs 4  -MS  4  -JM    Stairs, Handrail Location  right side (ascending)  -MS  right side (ascending)  -    Stairs, [place] Level supervision required  -MS  minimum assist (75% patient effort);contact guard assist;verbal cues required  -    Stairs, Technique Used   step to step (ascending);step to step (descending)  -    Stairs, Safety Issues   weight-shifting ability decreased;balance decreased during turns  -    Stairs, Impairments   strength decreased;impaired balance;pain  -    Stairs, Comment   fam present for educ, encouraged use of gt belt  -JM    Recorded by [MS] Sammy Manuel, PT  [JM] Katarina Remy PTA    Therapy Exercises    Exercise Protocols total knee  -MS --  -JM total knee  -JM    Total Knee Exercises left:;30 reps;completed protocol  -MS --  -JM left:;completed protocol;with assist;30 reps   only assist to initiate SLR  -JM    Recorded by [MS] Sammy Manuel, PT [JM] Katarina Remy PTA [JM] Katarina Remy PTA    Positioning and Restraints    Pre-Treatment Position sitting in chair/recliner  -MS  sitting in chair/recliner  -JM    Post Treatment Position bed  -MS  other  -JM    In Bed notified nsg;supine;call light within reach;encouraged to call for assist;with family/caregiver  -MS      Other Position   return to room with caregiver  -JM    Recorded by [MS] Sammy Manuel, PT  [JM] Katarina Remy PTA      11/30/17 1136 11/29/17 1624 11/29/17 1117    Rehab Assessment/Intervention    Discipline physical therapist  -MS physical therapist  -MS physical therapist  -MS    Document Type therapy note (daily note)  -MS therapy note (daily note)  -MS therapy note (daily note)  -MS    Subjective Information agree to therapy;complains of;fatigue;pain  -MS agree to therapy;complains of;fatigue;pain  -MS agree to therapy;complains of;fatigue;pain  -MS    Patient Effort, Rehab Treatment  good  -MS good  -MS    Symptoms Noted During/After Treatment fatigue  -MS fatigue  -MS fatigue  -MS    Symptoms Noted Comment  Pt. reports  increased soreness in his Left knee this PM.  -MS     Precautions/Limitations fall precautions  -MS fall precautions  -MS fall precautions  -MS    Recorded by [MS] Sammy Manuel PT [MS] Sammy Manuel PT [MS] Sammy Manuel PT    Pain Assessment    Pain Assessment 0-10  -MS 0-10  -MS 0-10  -MS    Pain Score 5  -MS 6  -MS 4  -MS    Post Pain Score 5  -MS 6  -MS 4  -MS    Pain Type Acute pain;Surgical pain  -MS Acute pain;Surgical pain  -MS Acute pain;Surgical pain  -MS    Pain Location Knee  -MS Knee  -MS Knee  -MS    Pain Orientation Left  -MS Left  -MS Left  -MS    Pain Intervention(s) Medication (See MAR);Cold applied;Repositioned;Elevated;Rest  -MS Medication (See MAR);Cold applied;Repositioned;Elevated;Rest  -MS Medication (See MAR);Cold applied;Repositioned;Elevated;Rest  -MS    Recorded by [MS] Sammy Manuel PT [MS] Sammy Manuel PT [MS] Sammy Manuel PT    Cognitive Assessment/Intervention    Current Cognitive/Communication Assessment functional  -MS functional  -MS functional  -MS    Orientation Status oriented x 4  -MS oriented x 4  -MS oriented x 4  -MS    Follows Commands/Answers Questions 100% of the time  -% of the time  -% of the time  -MS    Personal Safety WNL/WFL  -MS WNL/WFL  -MS WNL/WFL  -MS    Personal Safety Interventions fall prevention program maintained;gait belt;nonskid shoes/slippers when out of bed;supervised activity  -MS fall prevention program maintained;gait belt;nonskid shoes/slippers when out of bed;supervised activity  -MS fall prevention program maintained;gait belt;nonskid shoes/slippers when out of bed;supervised activity  -MS    Recorded by [MS] Sammy Manuel PT [MS] Sammy Manuel PT [MS] Sammy Manuel PT    ROM (Range of Motion)    General ROM Detail Left knee AROM (12, 100)  -MS  Left knee AROM (20, 90)  -MS    Recorded by [MS] Sammy Manuel PT  [MS] Sammy Manuel PT    Bed Mobility, Assessment/Treatment    Bed Mob, Sit to  Supine, [place] independent  -MS      Bed Mobility, Comment  Pt. up in chair this PM for the gym.  -MS Up in chair this AM for the gym.  -MS    Recorded by [MS] Sammy Manuel PT [MS] Sammy Manuel PT [MS] Sammy Manuel, PT    Transfer Assessment/Treatment    Transfers, Sit-Stand [place] stand by assist  -MS contact guard assist  -MS contact guard assist  -MS    Transfers, Stand-Sit [place] stand by assist  -MS contact guard assist  -MS contact guard assist  -MS    Transfers, Sit-Stand-Sit, Assist Device rolling walker  -MS rolling walker  -MS rolling walker  -MS    Recorded by [MS] Sammy Manuel PT [MS] Sammy Manuel PT [MS] Sammy Manuel PT    Gait Assessment/Treatment    Gait, [place] Level stand by assist  -MS contact guard assist  -MS contact guard assist  -MS    Gait, Assistive Device rolling walker  -MS rolling walker  -MS rolling walker  -MS    Gait, Distance (Feet) 120  -  -  -MS    Gait, Gait Pattern Analysis swing-through gait  -MS swing-through gait  -MS swing-through gait  -MS    Gait, Gait Deviations left:;antalgic;makayla decreased;forward flexed posture  -MS left:;antalgic;makayla decreased;forward flexed posture  -MS left:;antalgic;makayla decreased;forward flexed posture;step length decreased  -MS    Gait, Comment  Pt. requires continued verbal/tactile cues for posture correction.  -MS Verbal cues to increase his Left heel strike during gait cycle as well as correct his flexed forward posture.  Pt. able to initiate swing through gait pattern with use of RWX this AM.  -MS    Recorded by [MS] Sammy Manuel PT [MS] Sammy Mnauel PT [MS] Sammy Manuel PT    Therapy Exercises    Exercise Protocols total knee  -MS total knee  -MS total knee  -MS    Total Knee Exercises left:;25 reps;completed protocol;with assist  -MS left:;20 reps;completed protocol;with assist  -MS left:;15 reps;completed protocol;with assist  -MS    Recorded by [MS]  Sammy Manuel, PT [MS] Sammy Manuel PT [MS] Sammy Manuel PT    Positioning and Restraints    Pre-Treatment Position sitting in chair/recliner  -MS sitting in chair/recliner  -MS sitting in chair/recliner  -MS    Post Treatment Position bed  -MS chair  -MS chair  -MS    In Bed notified nsg;supine;call light within reach;encouraged to call for assist;exit alarm on;with family/caregiver  -MS      In Chair  notified nsg;reclined;sitting;call light within reach;encouraged to call for assist;with family/caregiver   Ice pack to Left knee.  -MS notified nsg;reclined;sitting;call light within reach;encouraged to call for assist;with family/caregiver   Ice pack to Left knee.  -MS    Recorded by [MS] Sammy Manuel PT [MS] Sammy Manuel PT [MS] Sammy Manuel PT      User Key  (r) = Recorded By, (t) = Taken By, (c) = Cosigned By    Initials Name Effective Dates    ARCHANA Remy, PTA 02/18/16 -     MS Sammy Manuel PT 12/01/15 -           PT Recommendation and Plan  Anticipated Equipment Needs At Discharge:  (Pt. has a RWX for home use.)  Anticipated Discharge Disposition: home with assist, home with home health  Planned Therapy Interventions: balance training, bed mobility training, gait training, home exercise program, patient/family education, postural re-education, ROM (Range of Motion), strengthening, transfer training  PT Frequency: 2 times/day  Plan of Care Review  Plan Of Care Reviewed With: patient, family  Progress: improving  Outcome Summary/Follow up Plan: Improved tolerance to functional activity this AM with an increase in gait distance and progression of ther. ex. protocol.          Outcome Measures       12/01/17 1000 11/30/17 1100 11/29/17 1600    How much help from another person do you currently need...    Turning from your back to your side while in flat bed without using bedrails? 4  -MS 4  -MS 4  -MS    Moving from lying on back to sitting on the side of a flat bed without  bedrails? 4  -MS 3  -MS 3  -MS    Moving to and from a bed to a chair (including a wheelchair)? 4  -MS 3  -MS 3  -MS    Standing up from a chair using your arms (e.g., wheelchair, bedside chair)? 4  -MS 3  -MS 3  -MS    Climbing 3-5 steps with a railing? 3  -MS 3  -MS 3  -MS    To walk in hospital room? 4  -MS 3  -MS 3  -MS    AM-PAC 6 Clicks Score 23  -MS 19  -MS 19  -MS    Functional Assessment    Outcome Measure Options AM-PAC 6 Clicks Basic Mobility (PT)  -MS AM-PAC 6 Clicks Basic Mobility (PT)  -MS AM-PAC 6 Clicks Basic Mobility (PT)  -MS      11/29/17 1100 11/28/17 1600       How much help from another person do you currently need...    Turning from your back to your side while in flat bed without using bedrails? 3  -MS 3  -MS     Moving from lying on back to sitting on the side of a flat bed without bedrails? 3  -MS 3  -MS     Moving to and from a bed to a chair (including a wheelchair)? 3  -MS 3  -MS     Standing up from a chair using your arms (e.g., wheelchair, bedside chair)? 3  -MS 3  -MS     Climbing 3-5 steps with a railing? 2  -MS 2  -MS     To walk in hospital room? 3  -MS 3  -MS     AM-PAC 6 Clicks Score 17  -MS 17  -MS     Functional Assessment    Outcome Measure Options AM-PAC 6 Clicks Basic Mobility (PT)  -MS AM-PAC 6 Clicks Basic Mobility (PT)  -MS       User Key  (r) = Recorded By, (t) = Taken By, (c) = Cosigned By    Initials Name Provider Type    MS Sammy SIM Kaleb PT Physical Therapist           Time Calculation:         PT Charges       12/01/17 1038          Time Calculation    Start Time 0841  -MS      Stop Time 0927  -MS      Time Calculation (min) 46 min  -MS      PT Received On 12/01/17  -MS        User Key  (r) = Recorded By, (t) = Taken By, (c) = Cosigned By    Initials Name Provider Type    MS Sammy SIM MICAELA Manuel Physical Therapist          Therapy Charges for Today     Code Description Service Date Service Provider Modifiers Qty    98396597095  PT THER PROC EA 15 MIN  11/30/2017 Sammy Manuel, PT GP 1    94543838113 HC PT THER PROC GROUP 11/30/2017 Sammy Manuel, PT GP 1    29923031375 HC PT THER PROC EA 15 MIN 12/1/2017 Sammy Manuel, PT GP 1    08980706693 HC PT THER PROC GROUP 12/1/2017 Sammy Manuel, PT GP 1          PT G-Codes  Outcome Measure Options: AM-PAC 6 Clicks Basic Mobility (PT)    PT Discharge Summary  Anticipated Discharge Disposition: home with assist, home with home health  Reason for Discharge: All goals achieved, Discharge from facility  Outcomes Achieved: Refer to plan of care for updates on goals achieved  Discharge Destination: Home with assist, Home with home health    Sammy Manuel, PT  12/1/2017

## 2022-08-25 ENCOUNTER — APPOINTMENT (OUTPATIENT)
Dept: GENERAL RADIOLOGY | Facility: HOSPITAL | Age: 77
End: 2022-08-25

## 2022-08-25 ENCOUNTER — ANESTHESIA EVENT (OUTPATIENT)
Dept: PERIOP | Facility: HOSPITAL | Age: 77
End: 2022-08-25

## 2022-08-25 ENCOUNTER — HOSPITAL ENCOUNTER (INPATIENT)
Facility: HOSPITAL | Age: 77
LOS: 15 days | Discharge: HOME | End: 2022-09-09
Attending: THORACIC SURGERY (CARDIOTHORACIC VASCULAR SURGERY) | Admitting: THORACIC SURGERY (CARDIOTHORACIC VASCULAR SURGERY)

## 2022-08-25 ENCOUNTER — TELEPHONE (OUTPATIENT)
Dept: CARDIAC SURGERY | Facility: CLINIC | Age: 77
End: 2022-08-25

## 2022-08-25 ENCOUNTER — PREP FOR SURGERY (OUTPATIENT)
Dept: OTHER | Facility: HOSPITAL | Age: 77
End: 2022-08-25

## 2022-08-25 ENCOUNTER — APPOINTMENT (OUTPATIENT)
Dept: CARDIOLOGY | Facility: HOSPITAL | Age: 77
End: 2022-08-25

## 2022-08-25 ENCOUNTER — HOSPITAL ENCOUNTER (OUTPATIENT)
Facility: HOSPITAL | Age: 77
Setting detail: SURGERY ADMIT
End: 2022-08-25
Attending: THORACIC SURGERY (CARDIOTHORACIC VASCULAR SURGERY) | Admitting: THORACIC SURGERY (CARDIOTHORACIC VASCULAR SURGERY)

## 2022-08-25 DIAGNOSIS — Z95.1 S/P CABG (CORONARY ARTERY BYPASS GRAFT): Primary | ICD-10-CM

## 2022-08-25 DIAGNOSIS — I25.10 CAD (CORONARY ARTERY DISEASE): ICD-10-CM

## 2022-08-25 DIAGNOSIS — I25.10 CORONARY ARTERY DISEASE DUE TO CALCIFIED CORONARY LESION: ICD-10-CM

## 2022-08-25 DIAGNOSIS — I25.10 CAD (CORONARY ARTERY DISEASE): Primary | ICD-10-CM

## 2022-08-25 DIAGNOSIS — I25.84 CORONARY ARTERY DISEASE DUE TO CALCIFIED CORONARY LESION: ICD-10-CM

## 2022-08-25 LAB
ARTERIAL PATENCY WRIST A: POSITIVE
ATMOSPHERIC PRESS: 751.8 MMHG
BASE EXCESS BLDA CALC-SCNC: 0.1 MMOL/L (ref 0–2)
BASOPHILS # BLD AUTO: 0.05 10*3/MM3 (ref 0–0.2)
BASOPHILS NFR BLD AUTO: 0.7 % (ref 0–1.5)
BDY SITE: ABNORMAL
DEPRECATED RDW RBC AUTO: 45.6 FL (ref 37–54)
EOSINOPHIL # BLD AUTO: 0.29 10*3/MM3 (ref 0–0.4)
EOSINOPHIL NFR BLD AUTO: 4.1 % (ref 0.3–6.2)
ERYTHROCYTE [DISTWIDTH] IN BLOOD BY AUTOMATED COUNT: 13.3 % (ref 12.3–15.4)
GLUCOSE BLDC GLUCOMTR-MCNC: 100 MG/DL (ref 70–130)
HBA1C MFR BLD: 6 % (ref 4.8–5.6)
HCO3 BLDA-SCNC: 23.7 MMOL/L (ref 22–28)
HCT VFR BLD AUTO: 44.8 % (ref 37.5–51)
HGB BLD-MCNC: 15.4 G/DL (ref 13–17.7)
IMM GRANULOCYTES # BLD AUTO: 0.02 10*3/MM3 (ref 0–0.05)
IMM GRANULOCYTES NFR BLD AUTO: 0.3 % (ref 0–0.5)
LYMPHOCYTES # BLD AUTO: 1.97 10*3/MM3 (ref 0.7–3.1)
LYMPHOCYTES NFR BLD AUTO: 27.7 % (ref 19.6–45.3)
MCH RBC QN AUTO: 32.2 PG (ref 26.6–33)
MCHC RBC AUTO-ENTMCNC: 34.4 G/DL (ref 31.5–35.7)
MCV RBC AUTO: 93.5 FL (ref 79–97)
MODALITY: ABNORMAL
MONOCYTES # BLD AUTO: 0.62 10*3/MM3 (ref 0.1–0.9)
MONOCYTES NFR BLD AUTO: 8.7 % (ref 5–12)
NEUTROPHILS NFR BLD AUTO: 4.16 10*3/MM3 (ref 1.7–7)
NEUTROPHILS NFR BLD AUTO: 58.5 % (ref 42.7–76)
NRBC BLD AUTO-RTO: 0 /100 WBC (ref 0–0.2)
PCO2 BLDA: 34.7 MM HG (ref 35–45)
PH BLDA: 7.44 PH UNITS (ref 7.35–7.45)
PLATELET # BLD AUTO: 186 10*3/MM3 (ref 140–450)
PMV BLD AUTO: 10 FL (ref 6–12)
PO2 BLDA: 81.6 MM HG (ref 80–100)
RBC # BLD AUTO: 4.79 10*6/MM3 (ref 4.14–5.8)
SAO2 % BLDCOA: 96.5 % (ref 92–99)
TOTAL RATE: 20 BREATHS/MINUTE
WBC NRBC COR # BLD: 7.11 10*3/MM3 (ref 3.4–10.8)

## 2022-08-25 PROCEDURE — 85025 COMPLETE CBC W/AUTO DIFF WBC: CPT | Performed by: NURSE PRACTITIONER

## 2022-08-25 PROCEDURE — 82962 GLUCOSE BLOOD TEST: CPT

## 2022-08-25 PROCEDURE — 85730 THROMBOPLASTIN TIME PARTIAL: CPT | Performed by: NURSE PRACTITIONER

## 2022-08-25 PROCEDURE — 25010000002 HYDRALAZINE PER 20 MG: Performed by: THORACIC SURGERY (CARDIOTHORACIC VASCULAR SURGERY)

## 2022-08-25 PROCEDURE — 83036 HEMOGLOBIN GLYCOSYLATED A1C: CPT | Performed by: NURSE PRACTITIONER

## 2022-08-25 PROCEDURE — 83735 ASSAY OF MAGNESIUM: CPT | Performed by: NURSE PRACTITIONER

## 2022-08-25 PROCEDURE — 86900 BLOOD TYPING SEROLOGIC ABO: CPT | Performed by: NURSE PRACTITIONER

## 2022-08-25 PROCEDURE — 80061 LIPID PANEL: CPT | Performed by: NURSE PRACTITIONER

## 2022-08-25 PROCEDURE — 82803 BLOOD GASES ANY COMBINATION: CPT

## 2022-08-25 PROCEDURE — 71046 X-RAY EXAM CHEST 2 VIEWS: CPT

## 2022-08-25 PROCEDURE — 86901 BLOOD TYPING SEROLOGIC RH(D): CPT | Performed by: NURSE PRACTITIONER

## 2022-08-25 PROCEDURE — 86850 RBC ANTIBODY SCREEN: CPT | Performed by: NURSE PRACTITIONER

## 2022-08-25 PROCEDURE — 93880 EXTRACRANIAL BILAT STUDY: CPT

## 2022-08-25 PROCEDURE — 93970 EXTREMITY STUDY: CPT

## 2022-08-25 PROCEDURE — 36600 WITHDRAWAL OF ARTERIAL BLOOD: CPT

## 2022-08-25 PROCEDURE — 80053 COMPREHEN METABOLIC PANEL: CPT | Performed by: NURSE PRACTITIONER

## 2022-08-25 PROCEDURE — U0004 COV-19 TEST NON-CDC HGH THRU: HCPCS | Performed by: THORACIC SURGERY (CARDIOTHORACIC VASCULAR SURGERY)

## 2022-08-25 PROCEDURE — 85610 PROTHROMBIN TIME: CPT | Performed by: NURSE PRACTITIONER

## 2022-08-25 PROCEDURE — 86923 COMPATIBILITY TEST ELECTRIC: CPT

## 2022-08-25 PROCEDURE — 85576 BLOOD PLATELET AGGREGATION: CPT | Performed by: NURSE PRACTITIONER

## 2022-08-25 PROCEDURE — 83880 ASSAY OF NATRIURETIC PEPTIDE: CPT | Performed by: NURSE PRACTITIONER

## 2022-08-25 RX ORDER — SODIUM CHLORIDE 9 MG/ML
30 INJECTION, SOLUTION INTRAVENOUS CONTINUOUS PRN
Status: DISCONTINUED | OUTPATIENT
Start: 2022-08-25 | End: 2022-08-26

## 2022-08-25 RX ORDER — ASPIRIN 81 MG/1
81 TABLET, CHEWABLE ORAL DAILY
COMMUNITY
End: 2022-09-09 | Stop reason: HOSPADM

## 2022-08-25 RX ORDER — CHLORHEXIDINE GLUCONATE 500 MG/1
1 CLOTH TOPICAL EVERY 12 HOURS
Status: COMPLETED | OUTPATIENT
Start: 2022-08-25 | End: 2022-08-26

## 2022-08-25 RX ORDER — SODIUM CHLORIDE 0.9 % (FLUSH) 0.9 %
10 SYRINGE (ML) INJECTION AS NEEDED
Status: DISCONTINUED | OUTPATIENT
Start: 2022-08-25 | End: 2022-08-26

## 2022-08-25 RX ORDER — ATORVASTATIN CALCIUM 20 MG/1
10 TABLET, FILM COATED ORAL EVERY EVENING
Status: DISCONTINUED | OUTPATIENT
Start: 2022-08-25 | End: 2022-08-26

## 2022-08-25 RX ORDER — NICOTINE POLACRILEX 4 MG
15 LOZENGE BUCCAL
Status: DISCONTINUED | OUTPATIENT
Start: 2022-08-25 | End: 2022-08-26

## 2022-08-25 RX ORDER — SODIUM CHLORIDE 0.9 % (FLUSH) 0.9 %
10 SYRINGE (ML) INJECTION EVERY 12 HOURS SCHEDULED
Status: DISCONTINUED | OUTPATIENT
Start: 2022-08-25 | End: 2022-08-26

## 2022-08-25 RX ORDER — SODIUM CHLORIDE 0.9 % (FLUSH) 0.9 %
30 SYRINGE (ML) INJECTION ONCE AS NEEDED
Status: DISCONTINUED | OUTPATIENT
Start: 2022-08-25 | End: 2022-08-26

## 2022-08-25 RX ORDER — HYDRALAZINE HYDROCHLORIDE 20 MG/ML
10 INJECTION INTRAMUSCULAR; INTRAVENOUS EVERY 6 HOURS PRN
Status: DISCONTINUED | OUTPATIENT
Start: 2022-08-25 | End: 2022-08-26

## 2022-08-25 RX ORDER — ALPRAZOLAM 0.25 MG/1
0.25 TABLET ORAL ONCE
Status: COMPLETED | OUTPATIENT
Start: 2022-08-25 | End: 2022-08-25

## 2022-08-25 RX ORDER — ALLOPURINOL 300 MG/1
400 TABLET ORAL DAILY
COMMUNITY

## 2022-08-25 RX ORDER — CEFAZOLIN SODIUM 2 G/100ML
2 INJECTION, SOLUTION INTRAVENOUS
Status: COMPLETED | OUTPATIENT
Start: 2022-08-26 | End: 2022-08-26

## 2022-08-25 RX ORDER — DEXTROSE MONOHYDRATE 25 G/50ML
10-50 INJECTION, SOLUTION INTRAVENOUS
Status: DISCONTINUED | OUTPATIENT
Start: 2022-08-25 | End: 2022-08-26

## 2022-08-25 RX ORDER — CHLORHEXIDINE GLUCONATE 0.12 MG/ML
15 RINSE ORAL EVERY 12 HOURS SCHEDULED
Status: COMPLETED | OUTPATIENT
Start: 2022-08-25 | End: 2022-08-26

## 2022-08-25 RX ADMIN — Medication 10 ML: at 23:09

## 2022-08-25 RX ADMIN — HYDRALAZINE HYDROCHLORIDE 10 MG: 20 INJECTION INTRAMUSCULAR; INTRAVENOUS at 22:18

## 2022-08-25 RX ADMIN — CHLORHEXIDINE GLUCONATE 1 APPLICATION: 500 CLOTH TOPICAL at 22:19

## 2022-08-25 RX ADMIN — ALPRAZOLAM 0.25 MG: 0.25 TABLET ORAL at 23:52

## 2022-08-25 RX ADMIN — MUPIROCIN 1 APPLICATION: 20 OINTMENT TOPICAL at 22:19

## 2022-08-25 RX ADMIN — ATORVASTATIN CALCIUM 10 MG: 20 TABLET, FILM COATED ORAL at 23:52

## 2022-08-25 RX ADMIN — CHLORHEXIDINE GLUCONATE 15 ML: 1.2 SOLUTION ORAL at 22:19

## 2022-08-25 NOTE — TELEPHONE ENCOUNTER
Spoke to Olivia with request for hospital transfer from [facility].Dr. Ruiz has reviewed cath and requested this. Patient is in Room 550, nurse Mayra, Direct number [phone]. Request CVU/CVI.

## 2022-08-26 ENCOUNTER — ANESTHESIA (OUTPATIENT)
Dept: PERIOP | Facility: HOSPITAL | Age: 77
End: 2022-08-26

## 2022-08-26 ENCOUNTER — APPOINTMENT (OUTPATIENT)
Dept: GENERAL RADIOLOGY | Facility: HOSPITAL | Age: 77
End: 2022-08-26

## 2022-08-26 ENCOUNTER — ANCILLARY PROCEDURE (OUTPATIENT)
Dept: PERIOP | Facility: HOSPITAL | Age: 77
End: 2022-08-26

## 2022-08-26 PROBLEM — I25.10 CAD (CORONARY ARTERY DISEASE), NATIVE CORONARY ARTERY: Status: ACTIVE | Noted: 2022-08-26

## 2022-08-26 LAB
ABO GROUP BLD: NORMAL
ACT BLD: 121 SECONDS (ref 82–152)
ACT BLD: 126 SECONDS (ref 82–152)
ACT BLD: 422 SECONDS (ref 82–152)
ACT BLD: 486 SECONDS (ref 82–152)
ACT BLD: 486 SECONDS (ref 82–152)
ALBUMIN SERPL-MCNC: 4.1 G/DL (ref 3.5–5.2)
ALBUMIN SERPL-MCNC: 4.3 G/DL (ref 3.5–5.2)
ALBUMIN SERPL-MCNC: 4.5 G/DL (ref 3.5–5.2)
ALBUMIN/GLOB SERPL: 1.7 G/DL
ALP SERPL-CCNC: 44 U/L (ref 39–117)
ALT SERPL W P-5'-P-CCNC: 25 U/L (ref 1–41)
ANION GAP SERPL CALCULATED.3IONS-SCNC: 11 MMOL/L (ref 5–15)
ANION GAP SERPL CALCULATED.3IONS-SCNC: 12 MMOL/L (ref 5–15)
ANION GAP SERPL CALCULATED.3IONS-SCNC: 7.5 MMOL/L (ref 5–15)
APTT PPP: 28.8 SECONDS (ref 22.7–35.4)
APTT PPP: 32.3 SECONDS (ref 22.7–35.4)
ARTERIAL PATENCY WRIST A: ABNORMAL
AST SERPL-CCNC: 22 U/L (ref 1–40)
ATMOSPHERIC PRESS: 751.3 MMHG
ATMOSPHERIC PRESS: 751.6 MMHG
ATMOSPHERIC PRESS: 752.4 MMHG
BASE EXCESS BLDA CALC-SCNC: -2 MMOL/L (ref -5–5)
BASE EXCESS BLDA CALC-SCNC: -2.6 MMOL/L (ref 0–2)
BASE EXCESS BLDA CALC-SCNC: -3.6 MMOL/L (ref 0–2)
BASE EXCESS BLDA CALC-SCNC: -4.1 MMOL/L (ref 0–2)
BASE EXCESS BLDA CALC-SCNC: 0 MMOL/L (ref -5–5)
BASE EXCESS BLDA CALC-SCNC: 0 MMOL/L (ref -5–5)
BASE EXCESS BLDA CALC-SCNC: 1 MMOL/L (ref -5–5)
BASE EXCESS BLDA CALC-SCNC: 3 MMOL/L (ref -5–5)
BASOPHILS # BLD AUTO: 0.05 10*3/MM3 (ref 0–0.2)
BASOPHILS NFR BLD AUTO: 0.3 % (ref 0–1.5)
BDY SITE: ABNORMAL
BH CV XLRA MEAS - DIST GSV CALF DIST LEFT: 0.17 CM
BH CV XLRA MEAS - DIST GSV CALF DIST RIGHT: 0.25 CM
BH CV XLRA MEAS - DIST GSV THIGH DIST LEFT: 0.32 CM
BH CV XLRA MEAS - DIST GSV THIGH DIST RIGHT: 0.39 CM
BH CV XLRA MEAS - DIST LSV CALF DIST LEFT: 0.08 CM
BH CV XLRA MEAS - DIST LSV CALF DIST RIGHT: 0.15 CM
BH CV XLRA MEAS - GSV ANKLE DIST LEFT: 0.19 CM
BH CV XLRA MEAS - GSV ANKLE DIST RIGHT: 0.17 CM
BH CV XLRA MEAS - GSV KNEE DIST LEFT: 0.3 CM
BH CV XLRA MEAS - GSV KNEE DIST RIGHT: 0.29 CM
BH CV XLRA MEAS - GSV ORIGIN DIST LEFT: 0.7 CM
BH CV XLRA MEAS - GSV ORIGIN DIST RIGHT: 0.72 CM
BH CV XLRA MEAS - MID GSV CALF LEFT: 0.21 CM
BH CV XLRA MEAS - MID GSV CALF RIGHT: 0.22 CM
BH CV XLRA MEAS - MID GSV THIGH  LEFT: 0.32 CM
BH CV XLRA MEAS - MID GSV THIGH  RIGHT: 0.35 CM
BH CV XLRA MEAS - MID LSV CALF DIST RIGHT: 0.17 CM
BH CV XLRA MEAS - PROX GSV CALF DIST LEFT: 0.32 CM
BH CV XLRA MEAS - PROX GSV CALF DIST RIGHT: 0.26 CM
BH CV XLRA MEAS - PROX GSV THIGH  LEFT: 0.42 CM
BH CV XLRA MEAS - PROX GSV THIGH  RIGHT: 0.37 CM
BH CV XLRA MEAS - PROX LSV CALF DIST LEFT: 0.13 CM
BH CV XLRA MEAS - PROX LSV CALF DIST RIGHT: 0.28 CM
BH CV XLRA MEAS LEFT DIST CCA EDV: -13.3 CM/SEC
BH CV XLRA MEAS LEFT DIST CCA PSV: -118.4 CM/SEC
BH CV XLRA MEAS LEFT DIST ICA EDV: -15.1 CM/SEC
BH CV XLRA MEAS LEFT DIST ICA PSV: -66 CM/SEC
BH CV XLRA MEAS LEFT ICA/CCA RATIO: 0.71
BH CV XLRA MEAS LEFT MID ICA EDV: -21.7 CM/SEC
BH CV XLRA MEAS LEFT MID ICA PSV: -69.8 CM/SEC
BH CV XLRA MEAS LEFT PROX CCA EDV: 15.4 CM/SEC
BH CV XLRA MEAS LEFT PROX CCA PSV: 114.9 CM/SEC
BH CV XLRA MEAS LEFT PROX ECA EDV: 12.6 CM/SEC
BH CV XLRA MEAS LEFT PROX ECA PSV: 98.8 CM/SEC
BH CV XLRA MEAS LEFT PROX ICA EDV: 16.1 CM/SEC
BH CV XLRA MEAS LEFT PROX ICA PSV: 84.1 CM/SEC
BH CV XLRA MEAS LEFT PROX SCLA PSV: 158 CM/SEC
BH CV XLRA MEAS LEFT VERTEBRAL A EDV: -8.4 CM/SEC
BH CV XLRA MEAS LEFT VERTEBRAL A PSV: -32.9 CM/SEC
BH CV XLRA MEAS RIGHT DIST CCA EDV: 9.3 CM/SEC
BH CV XLRA MEAS RIGHT DIST CCA PSV: 75.8 CM/SEC
BH CV XLRA MEAS RIGHT DIST ICA EDV: -18 CM/SEC
BH CV XLRA MEAS RIGHT DIST ICA PSV: -69.6 CM/SEC
BH CV XLRA MEAS RIGHT ICA/CCA RATIO: 1.05
BH CV XLRA MEAS RIGHT MID ICA EDV: -16.8 CM/SEC
BH CV XLRA MEAS RIGHT MID ICA PSV: -77 CM/SEC
BH CV XLRA MEAS RIGHT PROX CCA EDV: 9.9 CM/SEC
BH CV XLRA MEAS RIGHT PROX CCA PSV: 95.7 CM/SEC
BH CV XLRA MEAS RIGHT PROX ECA EDV: -8.6 CM/SEC
BH CV XLRA MEAS RIGHT PROX ECA PSV: -141.1 CM/SEC
BH CV XLRA MEAS RIGHT PROX ICA EDV: -13.7 CM/SEC
BH CV XLRA MEAS RIGHT PROX ICA PSV: -79.5 CM/SEC
BH CV XLRA MEAS RIGHT PROX SCLA PSV: 127.8 CM/SEC
BH CV XLRA MEAS RIGHT VERTEBRAL A EDV: 5.3 CM/SEC
BH CV XLRA MEAS RIGHT VERTEBRAL A PSV: 36.4 CM/SEC
BILIRUB SERPL-MCNC: 0.5 MG/DL (ref 0–1.2)
BILIRUB UR QL STRIP: NEGATIVE
BLD GP AB SCN SERPL QL: NEGATIVE
BUN SERPL-MCNC: 14 MG/DL (ref 8–23)
BUN SERPL-MCNC: 16 MG/DL (ref 8–23)
BUN SERPL-MCNC: 16 MG/DL (ref 8–23)
BUN/CREAT SERPL: 12.7 (ref 7–25)
BUN/CREAT SERPL: 13.7 (ref 7–25)
BUN/CREAT SERPL: 15.4 (ref 7–25)
CA-I BLD-MCNC: 5.2 MG/DL (ref 4.6–5.4)
CA-I BLDA-SCNC: ABNORMAL MMOL/L
CA-I BLDA-SCNC: NORMAL MMOL/L
CA-I SERPL ISE-MCNC: 1.31 MMOL/L (ref 1.15–1.35)
CALCIUM SPEC-SCNC: 8.9 MG/DL (ref 8.6–10.5)
CALCIUM SPEC-SCNC: 8.9 MG/DL (ref 8.6–10.5)
CALCIUM SPEC-SCNC: 9.2 MG/DL (ref 8.6–10.5)
CHLORIDE SERPL-SCNC: 103 MMOL/L (ref 98–107)
CHLORIDE SERPL-SCNC: 104 MMOL/L (ref 98–107)
CHLORIDE SERPL-SCNC: 106 MMOL/L (ref 98–107)
CHOLEST SERPL-MCNC: 122 MG/DL (ref 0–200)
CLARITY UR: CLEAR
CLOSE TME COLL+ADP + EPINEP PNL BLD: 61 % (ref 86–100)
CO2 BLDA-SCNC: 25 MMOL/L (ref 24–29)
CO2 BLDA-SCNC: 26 MMOL/L (ref 24–29)
CO2 BLDA-SCNC: 28 MMOL/L (ref 24–29)
CO2 BLDA-SCNC: 28 MMOL/L (ref 24–29)
CO2 BLDA-SCNC: 29 MMOL/L (ref 24–29)
CO2 SERPL-SCNC: 20 MMOL/L (ref 22–29)
CO2 SERPL-SCNC: 22 MMOL/L (ref 22–29)
CO2 SERPL-SCNC: 27.5 MMOL/L (ref 22–29)
COLOR UR: YELLOW
CREAT SERPL-MCNC: 1.04 MG/DL (ref 0.76–1.27)
CREAT SERPL-MCNC: 1.1 MG/DL (ref 0.76–1.27)
CREAT SERPL-MCNC: 1.17 MG/DL (ref 0.76–1.27)
DEPRECATED RDW RBC AUTO: 45 FL (ref 37–54)
DEPRECATED RDW RBC AUTO: 45.1 FL (ref 37–54)
EGFRCR SERPLBLD CKD-EPI 2021: 63.4 ML/MIN/1.73
EGFRCR SERPLBLD CKD-EPI 2021: 68.3 ML/MIN/1.73
EGFRCR SERPLBLD CKD-EPI 2021: 73 ML/MIN/1.73
EOSINOPHIL # BLD AUTO: 0.13 10*3/MM3 (ref 0–0.4)
EOSINOPHIL NFR BLD AUTO: 0.8 % (ref 0.3–6.2)
ERYTHROCYTE [DISTWIDTH] IN BLOOD BY AUTOMATED COUNT: 13.1 % (ref 12.3–15.4)
ERYTHROCYTE [DISTWIDTH] IN BLOOD BY AUTOMATED COUNT: 13.4 % (ref 12.3–15.4)
FIBRINOGEN PPP-MCNC: 257 MG/DL (ref 219–464)
GLOBULIN UR ELPH-MCNC: 2.4 GM/DL
GLUCOSE BLDC GLUCOMTR-MCNC: 100 MG/DL (ref 70–130)
GLUCOSE BLDC GLUCOMTR-MCNC: 114 MG/DL (ref 70–130)
GLUCOSE BLDC GLUCOMTR-MCNC: 126 MG/DL (ref 70–130)
GLUCOSE BLDC GLUCOMTR-MCNC: 127 MG/DL (ref 70–130)
GLUCOSE BLDC GLUCOMTR-MCNC: 136 MG/DL (ref 70–130)
GLUCOSE BLDC GLUCOMTR-MCNC: 136 MG/DL (ref 70–130)
GLUCOSE BLDC GLUCOMTR-MCNC: 140 MG/DL (ref 70–130)
GLUCOSE BLDC GLUCOMTR-MCNC: 140 MG/DL (ref 70–130)
GLUCOSE BLDC GLUCOMTR-MCNC: 141 MG/DL (ref 70–130)
GLUCOSE BLDC GLUCOMTR-MCNC: 141 MG/DL (ref 70–130)
GLUCOSE BLDC GLUCOMTR-MCNC: 142 MG/DL (ref 70–130)
GLUCOSE SERPL-MCNC: 115 MG/DL (ref 65–99)
GLUCOSE SERPL-MCNC: 143 MG/DL (ref 65–99)
GLUCOSE SERPL-MCNC: 152 MG/DL (ref 65–99)
GLUCOSE UR STRIP-MCNC: NEGATIVE MG/DL
HCO3 BLDA-SCNC: 20.8 MMOL/L (ref 22–28)
HCO3 BLDA-SCNC: 22.7 MMOL/L (ref 22–28)
HCO3 BLDA-SCNC: 22.9 MMOL/L (ref 22–28)
HCO3 BLDA-SCNC: 23.5 MMOL/L (ref 22–26)
HCO3 BLDA-SCNC: 24.9 MMOL/L (ref 22–26)
HCO3 BLDA-SCNC: 26.1 MMOL/L (ref 22–26)
HCO3 BLDA-SCNC: 26.5 MMOL/L (ref 22–26)
HCO3 BLDA-SCNC: 27.8 MMOL/L (ref 22–26)
HCT VFR BLD AUTO: 39.9 % (ref 37.5–51)
HCT VFR BLD AUTO: 41.5 % (ref 37.5–51)
HCT VFR BLDA CALC: 33 % (ref 38–51)
HCT VFR BLDA CALC: 33 % (ref 38–51)
HCT VFR BLDA CALC: 36 % (ref 38–51)
HCT VFR BLDA CALC: 38 % (ref 38–51)
HCT VFR BLDA CALC: 42 % (ref 38–51)
HDLC SERPL-MCNC: 35 MG/DL (ref 40–60)
HGB BLD-MCNC: 13.4 G/DL (ref 13–17.7)
HGB BLD-MCNC: 14.1 G/DL (ref 13–17.7)
HGB BLDA-MCNC: 11.2 G/DL (ref 12–17)
HGB BLDA-MCNC: 11.2 G/DL (ref 12–17)
HGB BLDA-MCNC: 12.2 G/DL (ref 12–17)
HGB BLDA-MCNC: 12.9 G/DL (ref 12–17)
HGB BLDA-MCNC: 14.3 G/DL (ref 12–17)
HGB UR QL STRIP.AUTO: NEGATIVE
IMM GRANULOCYTES # BLD AUTO: 0.09 10*3/MM3 (ref 0–0.05)
IMM GRANULOCYTES NFR BLD AUTO: 0.5 % (ref 0–0.5)
INHALED O2 CONCENTRATION: 100 %
INHALED O2 CONCENTRATION: 40 %
INHALED O2 CONCENTRATION: 40 %
INR PPP: 1.05 (ref 0.9–1.1)
INR PPP: 1.21 (ref 0.9–1.1)
KETONES UR QL STRIP: NEGATIVE
LDLC SERPL CALC-MCNC: 63 MG/DL (ref 0–100)
LDLC/HDLC SERPL: 1.71 {RATIO}
LEUKOCYTE ESTERASE UR QL STRIP.AUTO: NEGATIVE
LYMPHOCYTES # BLD AUTO: 1.03 10*3/MM3 (ref 0.7–3.1)
LYMPHOCYTES NFR BLD AUTO: 6.2 % (ref 19.6–45.3)
MAGNESIUM SERPL-MCNC: 2 MG/DL (ref 1.6–2.4)
MAGNESIUM SERPL-MCNC: 2.1 MG/DL (ref 1.6–2.4)
MAGNESIUM SERPL-MCNC: 2.4 MG/DL (ref 1.6–2.4)
MAXIMAL PREDICTED HEART RATE: 141 BPM
MAXIMAL PREDICTED HEART RATE: 141 BPM
MCH RBC QN AUTO: 31.4 PG (ref 26.6–33)
MCH RBC QN AUTO: 31.8 PG (ref 26.6–33)
MCHC RBC AUTO-ENTMCNC: 33.6 G/DL (ref 31.5–35.7)
MCHC RBC AUTO-ENTMCNC: 34 G/DL (ref 31.5–35.7)
MCV RBC AUTO: 93.4 FL (ref 79–97)
MCV RBC AUTO: 93.7 FL (ref 79–97)
MODALITY: ABNORMAL
MONOCYTES # BLD AUTO: 1.29 10*3/MM3 (ref 0.1–0.9)
MONOCYTES NFR BLD AUTO: 7.7 % (ref 5–12)
NEUTROPHILS NFR BLD AUTO: 14.11 10*3/MM3 (ref 1.7–7)
NEUTROPHILS NFR BLD AUTO: 84.5 % (ref 42.7–76)
NITRITE UR QL STRIP: NEGATIVE
NRBC BLD AUTO-RTO: 0 /100 WBC (ref 0–0.2)
NT-PROBNP SERPL-MCNC: 303 PG/ML (ref 0–1800)
O2 A-A PPRESDIFF RESPIRATORY: 0.2 MMHG
O2 A-A PPRESDIFF RESPIRATORY: 0.3 MMHG
O2 A-A PPRESDIFF RESPIRATORY: 0.3 MMHG
PCO2 BLDA: 36.7 MM HG (ref 35–45)
PCO2 BLDA: 40 MM HG (ref 35–45)
PCO2 BLDA: 40.7 MM HG (ref 35–45)
PCO2 BLDA: 42.2 MM HG (ref 35–45)
PCO2 BLDA: 44.3 MM HG (ref 35–45)
PCO2 BLDA: 45.3 MM HG (ref 35–45)
PCO2 BLDA: 48.5 MM HG (ref 35–45)
PCO2 BLDA: 49.2 MM HG (ref 35–45)
PEEP RESPIRATORY: 7 CM[H2O]
PH BLDA: 7.31 PH UNITS (ref 7.35–7.45)
PH BLDA: 7.36 PH UNITS (ref 7.35–7.45)
PH BLDA: 7.36 PH UNITS (ref 7.35–7.45)
PH BLDA: 7.36 PH UNITS (ref 7.35–7.6)
PH BLDA: 7.37 PH UNITS (ref 7.35–7.6)
PH BLDA: 7.38 PH UNITS (ref 7.35–7.6)
PH BLDA: 7.39 PH UNITS (ref 7.35–7.6)
PH BLDA: 7.43 PH UNITS (ref 7.35–7.6)
PH UR STRIP.AUTO: 6.5 [PH] (ref 5–8)
PHOSPHATE SERPL-MCNC: 2.5 MG/DL (ref 2.5–4.5)
PHOSPHATE SERPL-MCNC: 3.6 MG/DL (ref 2.5–4.5)
PLATELET # BLD AUTO: 136 10*3/MM3 (ref 140–450)
PLATELET # BLD AUTO: 138 10*3/MM3 (ref 140–450)
PMV BLD AUTO: 10 FL (ref 6–12)
PMV BLD AUTO: 9.9 FL (ref 6–12)
PO2 BLDA: 132.6 MM HG (ref 80–100)
PO2 BLDA: 347 MMHG (ref 80–105)
PO2 BLDA: 365 MMHG (ref 80–105)
PO2 BLDA: 369 MMHG (ref 80–105)
PO2 BLDA: 48 MMHG (ref 80–105)
PO2 BLDA: 70.8 MM HG (ref 80–100)
PO2 BLDA: 85 MMHG (ref 80–105)
PO2 BLDA: 85.1 MM HG (ref 80–100)
POTASSIUM BLDA-SCNC: 3.8 MMOL/L (ref 3.5–4.9)
POTASSIUM BLDA-SCNC: 4 MMOL/L (ref 3.5–4.9)
POTASSIUM BLDA-SCNC: 4.3 MMOL/L (ref 3.5–4.9)
POTASSIUM BLDA-SCNC: 4.5 MMOL/L (ref 3.5–4.9)
POTASSIUM BLDA-SCNC: 4.6 MMOL/L (ref 3.5–4.9)
POTASSIUM SERPL-SCNC: 3.9 MMOL/L (ref 3.5–5.2)
POTASSIUM SERPL-SCNC: 4 MMOL/L (ref 3.5–5.2)
POTASSIUM SERPL-SCNC: 4.1 MMOL/L (ref 3.5–5.2)
PROT SERPL-MCNC: 6.5 G/DL (ref 6–8.5)
PROT UR QL STRIP: NEGATIVE
PROTHROMBIN TIME: 13.6 SECONDS (ref 11.7–14.2)
PROTHROMBIN TIME: 15.1 SECONDS (ref 11.7–14.2)
PSV: 8 CMH2O
PSV: 8 CMH2O
RBC # BLD AUTO: 4.27 10*6/MM3 (ref 4.14–5.8)
RBC # BLD AUTO: 4.43 10*6/MM3 (ref 4.14–5.8)
RH BLD: NEGATIVE
SAO2 % BLDA: 100 % (ref 95–98)
SAO2 % BLDA: 81 % (ref 95–98)
SAO2 % BLDA: 96 % (ref 95–98)
SAO2 % BLDCOA: 93.4 % (ref 92–99)
SAO2 % BLDCOA: 95.3 % (ref 92–99)
SAO2 % BLDCOA: 98.9 % (ref 92–99)
SARS-COV-2 ORF1AB RESP QL NAA+PROBE: NOT DETECTED
SET MECH RESP RATE: 18
SODIUM SERPL-SCNC: 137 MMOL/L (ref 136–145)
SODIUM SERPL-SCNC: 138 MMOL/L (ref 136–145)
SODIUM SERPL-SCNC: 138 MMOL/L (ref 136–145)
SP GR UR STRIP: 1.01 (ref 1–1.03)
STRESS TARGET HR: 120 BPM
STRESS TARGET HR: 120 BPM
T&S EXPIRATION DATE: NORMAL
TOTAL RATE: 18 BREATHS/MINUTE
TOTAL RATE: 24 BREATHS/MINUTE
TOTAL RATE: 27 BREATHS/MINUTE
TRIGL SERPL-MCNC: 136 MG/DL (ref 0–150)
UROBILINOGEN UR QL STRIP: NORMAL
VENTILATOR MODE: ABNORMAL
VENTILATOR MODE: ABNORMAL
VENTILATOR MODE: AC
VLDLC SERPL-MCNC: 24 MG/DL (ref 5–40)
VT ON VENT VENT: 650 ML
WBC NRBC COR # BLD: 12.01 10*3/MM3 (ref 3.4–10.8)
WBC NRBC COR # BLD: 16.7 10*3/MM3 (ref 3.4–10.8)

## 2022-08-26 PROCEDURE — 25010000002 SUCCINYLCHOLINE PER 20 MG: Performed by: ANESTHESIOLOGY

## 2022-08-26 PROCEDURE — A4648 IMPLANTABLE TISSUE MARKER: HCPCS | Performed by: THORACIC SURGERY (CARDIOTHORACIC VASCULAR SURGERY)

## 2022-08-26 PROCEDURE — 25010000002 METOCLOPRAMIDE PER 10 MG: Performed by: NURSE PRACTITIONER

## 2022-08-26 PROCEDURE — 0T9B80Z DRAINAGE OF BLADDER WITH DRAINAGE DEVICE, VIA NATURAL OR ARTIFICIAL OPENING ENDOSCOPIC: ICD-10-PCS | Performed by: UROLOGY

## 2022-08-26 PROCEDURE — 85347 COAGULATION TIME ACTIVATED: CPT

## 2022-08-26 PROCEDURE — 33519 CABG ARTERY-VEIN THREE: CPT | Performed by: PHYSICIAN ASSISTANT

## 2022-08-26 PROCEDURE — C1751 CATH, INF, PER/CENT/MIDLINE: HCPCS | Performed by: ANESTHESIOLOGY

## 2022-08-26 PROCEDURE — 25010000002 HEPARIN (PORCINE) PER 1000 UNITS: Performed by: THORACIC SURGERY (CARDIOTHORACIC VASCULAR SURGERY)

## 2022-08-26 PROCEDURE — 25010000002 ALBUMIN HUMAN 5% PER 50 ML: Performed by: NURSE PRACTITIONER

## 2022-08-26 PROCEDURE — 33533 CABG ARTERIAL SINGLE: CPT | Performed by: PHYSICIAN ASSISTANT

## 2022-08-26 PROCEDURE — 85610 PROTHROMBIN TIME: CPT | Performed by: NURSE PRACTITIONER

## 2022-08-26 PROCEDURE — 82803 BLOOD GASES ANY COMBINATION: CPT

## 2022-08-26 PROCEDURE — 02100Z9 BYPASS CORONARY ARTERY, ONE ARTERY FROM LEFT INTERNAL MAMMARY, OPEN APPROACH: ICD-10-PCS | Performed by: THORACIC SURGERY (CARDIOTHORACIC VASCULAR SURGERY)

## 2022-08-26 PROCEDURE — 25010000002 MAGNESIUM SULFATE IN D5W 1G/100ML (PREMIX) 1-5 GM/100ML-% SOLUTION: Performed by: NURSE PRACTITIONER

## 2022-08-26 PROCEDURE — 0 PROTAMINE SULFATE PER 10 MG: Performed by: THORACIC SURGERY (CARDIOTHORACIC VASCULAR SURGERY)

## 2022-08-26 PROCEDURE — 81003 URINALYSIS AUTO W/O SCOPE: CPT | Performed by: THORACIC SURGERY (CARDIOTHORACIC VASCULAR SURGERY)

## 2022-08-26 PROCEDURE — 021209W BYPASS CORONARY ARTERY, THREE ARTERIES FROM AORTA WITH AUTOLOGOUS VENOUS TISSUE, OPEN APPROACH: ICD-10-PCS | Performed by: THORACIC SURGERY (CARDIOTHORACIC VASCULAR SURGERY)

## 2022-08-26 PROCEDURE — 33533 CABG ARTERIAL SINGLE: CPT | Performed by: THORACIC SURGERY (CARDIOTHORACIC VASCULAR SURGERY)

## 2022-08-26 PROCEDURE — P9047 ALBUMIN (HUMAN), 25%, 50ML: HCPCS

## 2022-08-26 PROCEDURE — 85384 FIBRINOGEN ACTIVITY: CPT | Performed by: NURSE PRACTITIONER

## 2022-08-26 PROCEDURE — C1713 ANCHOR/SCREW BN/BN,TIS/BN: HCPCS | Performed by: THORACIC SURGERY (CARDIOTHORACIC VASCULAR SURGERY)

## 2022-08-26 PROCEDURE — 82947 ASSAY GLUCOSE BLOOD QUANT: CPT

## 2022-08-26 PROCEDURE — 5A1221Z PERFORMANCE OF CARDIAC OUTPUT, CONTINUOUS: ICD-10-PCS | Performed by: THORACIC SURGERY (CARDIOTHORACIC VASCULAR SURGERY)

## 2022-08-26 PROCEDURE — 94002 VENT MGMT INPAT INIT DAY: CPT

## 2022-08-26 PROCEDURE — 80069 RENAL FUNCTION PANEL: CPT | Performed by: NURSE PRACTITIONER

## 2022-08-26 PROCEDURE — 25010000002 CEFAZOLIN PER 500 MG: Performed by: NURSE PRACTITIONER

## 2022-08-26 PROCEDURE — 25010000002 ALBUMIN HUMAN 25% PER 50 ML

## 2022-08-26 PROCEDURE — 25010000002 MORPHINE PER 10 MG: Performed by: NURSE PRACTITIONER

## 2022-08-26 PROCEDURE — 25010000002 PHENYLEPHRINE 10 MG/ML SOLUTION 5 ML VIAL: Performed by: ANESTHESIOLOGY

## 2022-08-26 PROCEDURE — C1729 CATH, DRAINAGE: HCPCS | Performed by: THORACIC SURGERY (CARDIOTHORACIC VASCULAR SURGERY)

## 2022-08-26 PROCEDURE — C1769 GUIDE WIRE: HCPCS | Performed by: THORACIC SURGERY (CARDIOTHORACIC VASCULAR SURGERY)

## 2022-08-26 PROCEDURE — 0 METHADONE PER 10 MG: Performed by: ANESTHESIOLOGY

## 2022-08-26 PROCEDURE — 33508 ENDOSCOPIC VEIN HARVEST: CPT | Performed by: PHYSICIAN ASSISTANT

## 2022-08-26 PROCEDURE — 71045 X-RAY EXAM CHEST 1 VIEW: CPT

## 2022-08-26 PROCEDURE — P9041 ALBUMIN (HUMAN),5%, 50ML: HCPCS | Performed by: NURSE PRACTITIONER

## 2022-08-26 PROCEDURE — 25010000002 PAPAVERINE PER 60 MG: Performed by: THORACIC SURGERY (CARDIOTHORACIC VASCULAR SURGERY)

## 2022-08-26 PROCEDURE — 06BQ4ZZ EXCISION OF LEFT SAPHENOUS VEIN, PERCUTANEOUS ENDOSCOPIC APPROACH: ICD-10-PCS | Performed by: THORACIC SURGERY (CARDIOTHORACIC VASCULAR SURGERY)

## 2022-08-26 PROCEDURE — 94799 UNLISTED PULMONARY SVC/PX: CPT

## 2022-08-26 PROCEDURE — 85025 COMPLETE CBC W/AUTO DIFF WBC: CPT | Performed by: NURSE PRACTITIONER

## 2022-08-26 PROCEDURE — 93318 ECHO TRANSESOPHAGEAL INTRAOP: CPT | Performed by: ANESTHESIOLOGY

## 2022-08-26 PROCEDURE — 87086 URINE CULTURE/COLONY COUNT: CPT | Performed by: THORACIC SURGERY (CARDIOTHORACIC VASCULAR SURGERY)

## 2022-08-26 PROCEDURE — 85027 COMPLETE CBC AUTOMATED: CPT | Performed by: NURSE PRACTITIONER

## 2022-08-26 PROCEDURE — 99223 1ST HOSP IP/OBS HIGH 75: CPT | Performed by: THORACIC SURGERY (CARDIOTHORACIC VASCULAR SURGERY)

## 2022-08-26 PROCEDURE — 93005 ELECTROCARDIOGRAM TRACING: CPT | Performed by: NURSE PRACTITIONER

## 2022-08-26 PROCEDURE — 85018 HEMOGLOBIN: CPT

## 2022-08-26 PROCEDURE — 33508 ENDOSCOPIC VEIN HARVEST: CPT | Performed by: THORACIC SURGERY (CARDIOTHORACIC VASCULAR SURGERY)

## 2022-08-26 PROCEDURE — 82962 GLUCOSE BLOOD TEST: CPT

## 2022-08-26 PROCEDURE — 33519 CABG ARTERY-VEIN THREE: CPT | Performed by: THORACIC SURGERY (CARDIOTHORACIC VASCULAR SURGERY)

## 2022-08-26 PROCEDURE — B24BZZ4 ULTRASONOGRAPHY OF HEART WITH AORTA, TRANSESOPHAGEAL: ICD-10-PCS | Performed by: THORACIC SURGERY (CARDIOTHORACIC VASCULAR SURGERY)

## 2022-08-26 PROCEDURE — 85730 THROMBOPLASTIN TIME PARTIAL: CPT | Performed by: NURSE PRACTITIONER

## 2022-08-26 PROCEDURE — 83735 ASSAY OF MAGNESIUM: CPT | Performed by: NURSE PRACTITIONER

## 2022-08-26 PROCEDURE — 25010000002 PROPOFOL 10 MG/ML EMULSION: Performed by: ANESTHESIOLOGY

## 2022-08-26 PROCEDURE — 25010000002 VANCOMYCIN 1 G RECONSTITUTED SOLUTION

## 2022-08-26 PROCEDURE — 25010000002 HEPARIN (PORCINE) PER 1000 UNITS

## 2022-08-26 PROCEDURE — 85014 HEMATOCRIT: CPT

## 2022-08-26 PROCEDURE — 82330 ASSAY OF CALCIUM: CPT | Performed by: NURSE PRACTITIONER

## 2022-08-26 PROCEDURE — 25010000002 HEPARIN (PORCINE) PER 1000 UNITS: Performed by: ANESTHESIOLOGY

## 2022-08-26 DEVICE — SS SUTURE, 4 PER SLEEVE
Type: IMPLANTABLE DEVICE | Site: CHEST WALL | Status: FUNCTIONAL
Brand: MYO/WIRE II

## 2022-08-26 DEVICE — SS SUTURE, 3 PER SLEEVE
Type: IMPLANTABLE DEVICE | Site: CHEST WALL | Status: FUNCTIONAL
Brand: MYO/WIRE II

## 2022-08-26 RX ORDER — METHADONE HYDROCHLORIDE 10 MG/ML
INJECTION, SOLUTION INTRAMUSCULAR; INTRAVENOUS; SUBCUTANEOUS AS NEEDED
Status: DISCONTINUED | OUTPATIENT
Start: 2022-08-26 | End: 2022-08-26 | Stop reason: SURG

## 2022-08-26 RX ORDER — ACETAMINOPHEN 160 MG/5ML
650 SOLUTION ORAL EVERY 4 HOURS PRN
Status: DISCONTINUED | OUTPATIENT
Start: 2022-08-27 | End: 2022-09-09 | Stop reason: HOSPADM

## 2022-08-26 RX ORDER — AMOXICILLIN 250 MG
2 CAPSULE ORAL NIGHTLY
Status: DISCONTINUED | OUTPATIENT
Start: 2022-08-27 | End: 2022-08-30

## 2022-08-26 RX ORDER — NITROGLYCERIN 20 MG/100ML
5-200 INJECTION INTRAVENOUS
Status: DISCONTINUED | OUTPATIENT
Start: 2022-08-26 | End: 2022-08-27

## 2022-08-26 RX ORDER — ACETAMINOPHEN 325 MG/1
650 TABLET ORAL EVERY 4 HOURS PRN
Status: DISCONTINUED | OUTPATIENT
Start: 2022-08-27 | End: 2022-09-09 | Stop reason: HOSPADM

## 2022-08-26 RX ORDER — MEPERIDINE HYDROCHLORIDE 25 MG/ML
25 INJECTION INTRAMUSCULAR; INTRAVENOUS; SUBCUTANEOUS EVERY 4 HOURS PRN
Status: DISCONTINUED | OUTPATIENT
Start: 2022-08-26 | End: 2022-08-27

## 2022-08-26 RX ORDER — NALOXONE HCL 0.4 MG/ML
0.4 VIAL (ML) INJECTION
Status: DISCONTINUED | OUTPATIENT
Start: 2022-08-26 | End: 2022-09-01

## 2022-08-26 RX ORDER — ONDANSETRON 2 MG/ML
4 INJECTION INTRAMUSCULAR; INTRAVENOUS EVERY 6 HOURS PRN
Status: DISCONTINUED | OUTPATIENT
Start: 2022-08-26 | End: 2022-09-09 | Stop reason: HOSPADM

## 2022-08-26 RX ORDER — AMINOCAPROIC ACID 250 MG/ML
INJECTION, SOLUTION INTRAVENOUS AS NEEDED
Status: DISCONTINUED | OUTPATIENT
Start: 2022-08-26 | End: 2022-08-26 | Stop reason: SURG

## 2022-08-26 RX ORDER — ACETAMINOPHEN 160 MG/5ML
650 SOLUTION ORAL EVERY 4 HOURS
Status: DISCONTINUED | OUTPATIENT
Start: 2022-08-26 | End: 2022-08-27

## 2022-08-26 RX ORDER — NICOTINE POLACRILEX 4 MG
15 LOZENGE BUCCAL
Status: DISCONTINUED | OUTPATIENT
Start: 2022-08-26 | End: 2022-09-09 | Stop reason: HOSPADM

## 2022-08-26 RX ORDER — DEXMEDETOMIDINE HYDROCHLORIDE 4 UG/ML
INJECTION, SOLUTION INTRAVENOUS CONTINUOUS PRN
Status: DISCONTINUED | OUTPATIENT
Start: 2022-08-26 | End: 2022-08-26 | Stop reason: SURG

## 2022-08-26 RX ORDER — HEPARIN SODIUM 1000 [USP'U]/ML
INJECTION, SOLUTION INTRAVENOUS; SUBCUTANEOUS AS NEEDED
Status: DISCONTINUED | OUTPATIENT
Start: 2022-08-26 | End: 2022-08-26 | Stop reason: SURG

## 2022-08-26 RX ORDER — PANTOPRAZOLE SODIUM 40 MG/1
40 TABLET, DELAYED RELEASE ORAL EVERY MORNING
Status: DISCONTINUED | OUTPATIENT
Start: 2022-08-27 | End: 2022-09-09 | Stop reason: HOSPADM

## 2022-08-26 RX ORDER — DEXTROSE MONOHYDRATE 25 G/50ML
10-50 INJECTION, SOLUTION INTRAVENOUS
Status: DISCONTINUED | OUTPATIENT
Start: 2022-08-26 | End: 2022-09-09 | Stop reason: HOSPADM

## 2022-08-26 RX ORDER — POLYETHYLENE GLYCOL 3350 17 G/17G
17 POWDER, FOR SOLUTION ORAL DAILY PRN
Status: DISCONTINUED | OUTPATIENT
Start: 2022-08-26 | End: 2022-09-09 | Stop reason: HOSPADM

## 2022-08-26 RX ORDER — ENOXAPARIN SODIUM 100 MG/ML
40 INJECTION SUBCUTANEOUS DAILY
Status: DISCONTINUED | OUTPATIENT
Start: 2022-08-27 | End: 2022-09-05

## 2022-08-26 RX ORDER — LIDOCAINE HYDROCHLORIDE 20 MG/ML
INJECTION, SOLUTION INFILTRATION; PERINEURAL AS NEEDED
Status: DISCONTINUED | OUTPATIENT
Start: 2022-08-26 | End: 2022-08-26 | Stop reason: SURG

## 2022-08-26 RX ORDER — ASPIRIN 81 MG/1
81 TABLET ORAL DAILY
Status: DISCONTINUED | OUTPATIENT
Start: 2022-08-27 | End: 2022-09-09 | Stop reason: HOSPADM

## 2022-08-26 RX ORDER — POTASSIUM CHLORIDE 7.45 MG/ML
10 INJECTION INTRAVENOUS
Status: DISCONTINUED | OUTPATIENT
Start: 2022-08-26 | End: 2022-08-31

## 2022-08-26 RX ORDER — ALBUMIN, HUMAN INJ 5% 5 %
1500 SOLUTION INTRAVENOUS AS NEEDED
Status: DISPENSED | OUTPATIENT
Start: 2022-08-26 | End: 2022-08-27

## 2022-08-26 RX ORDER — MORPHINE SULFATE 2 MG/ML
1 INJECTION, SOLUTION INTRAMUSCULAR; INTRAVENOUS EVERY 4 HOURS PRN
Status: DISCONTINUED | OUTPATIENT
Start: 2022-08-26 | End: 2022-09-01

## 2022-08-26 RX ORDER — POTASSIUM CHLORIDE 29.8 MG/ML
20 INJECTION INTRAVENOUS
Status: DISCONTINUED | OUTPATIENT
Start: 2022-08-26 | End: 2022-09-09 | Stop reason: HOSPADM

## 2022-08-26 RX ORDER — SODIUM CHLORIDE 9 MG/ML
30 INJECTION, SOLUTION INTRAVENOUS CONTINUOUS
Status: DISCONTINUED | OUTPATIENT
Start: 2022-08-26 | End: 2022-09-01

## 2022-08-26 RX ORDER — CYCLOBENZAPRINE HCL 10 MG
10 TABLET ORAL EVERY 8 HOURS PRN
Status: DISCONTINUED | OUTPATIENT
Start: 2022-08-27 | End: 2022-09-09 | Stop reason: HOSPADM

## 2022-08-26 RX ORDER — MORPHINE SULFATE 2 MG/ML
4 INJECTION, SOLUTION INTRAMUSCULAR; INTRAVENOUS
Status: DISCONTINUED | OUTPATIENT
Start: 2022-08-26 | End: 2022-08-29

## 2022-08-26 RX ORDER — ACETAMINOPHEN 650 MG/1
650 SUPPOSITORY RECTAL EVERY 4 HOURS PRN
Status: DISCONTINUED | OUTPATIENT
Start: 2022-08-27 | End: 2022-09-09 | Stop reason: HOSPADM

## 2022-08-26 RX ORDER — POTASSIUM CHLORIDE 1.5 G/1.77G
40 POWDER, FOR SOLUTION ORAL AS NEEDED
Status: DISCONTINUED | OUTPATIENT
Start: 2022-08-26 | End: 2022-08-31

## 2022-08-26 RX ORDER — PHENYLEPHRINE HCL IN 0.9% NACL 0.5 MG/5ML
.2-2 SYRINGE (ML) INTRAVENOUS CONTINUOUS PRN
Status: DISCONTINUED | OUTPATIENT
Start: 2022-08-26 | End: 2022-08-29

## 2022-08-26 RX ORDER — SUCCINYLCHOLINE CHLORIDE 20 MG/ML
INJECTION INTRAMUSCULAR; INTRAVENOUS AS NEEDED
Status: DISCONTINUED | OUTPATIENT
Start: 2022-08-26 | End: 2022-08-26 | Stop reason: SURG

## 2022-08-26 RX ORDER — HEPARIN SODIUM 5000 [USP'U]/ML
INJECTION, SOLUTION INTRAVENOUS; SUBCUTANEOUS AS NEEDED
Status: DISCONTINUED | OUTPATIENT
Start: 2022-08-26 | End: 2022-08-26 | Stop reason: HOSPADM

## 2022-08-26 RX ORDER — NITROGLYCERIN 20 MG/100ML
INJECTION INTRAVENOUS AS NEEDED
Status: DISCONTINUED | OUTPATIENT
Start: 2022-08-26 | End: 2022-08-26 | Stop reason: SURG

## 2022-08-26 RX ORDER — METOCLOPRAMIDE HYDROCHLORIDE 5 MG/ML
10 INJECTION INTRAMUSCULAR; INTRAVENOUS EVERY 6 HOURS
Status: DISCONTINUED | OUTPATIENT
Start: 2022-08-26 | End: 2022-08-27

## 2022-08-26 RX ORDER — ROCURONIUM BROMIDE 10 MG/ML
INJECTION, SOLUTION INTRAVENOUS AS NEEDED
Status: DISCONTINUED | OUTPATIENT
Start: 2022-08-26 | End: 2022-08-26 | Stop reason: SURG

## 2022-08-26 RX ORDER — PROPOFOL 10 MG/ML
VIAL (ML) INTRAVENOUS AS NEEDED
Status: DISCONTINUED | OUTPATIENT
Start: 2022-08-26 | End: 2022-08-26 | Stop reason: SURG

## 2022-08-26 RX ORDER — NICARDIPINE HYDROCHLORIDE 2.5 MG/ML
INJECTION INTRAVENOUS AS NEEDED
Status: DISCONTINUED | OUTPATIENT
Start: 2022-08-26 | End: 2022-08-26 | Stop reason: SURG

## 2022-08-26 RX ORDER — DOPAMINE HYDROCHLORIDE 160 MG/100ML
2-20 INJECTION, SOLUTION INTRAVENOUS CONTINUOUS PRN
Status: DISCONTINUED | OUTPATIENT
Start: 2022-08-26 | End: 2022-08-27

## 2022-08-26 RX ORDER — NOREPINEPHRINE BIT/0.9 % NACL 8 MG/250ML
.02-.3 INFUSION BOTTLE (ML) INTRAVENOUS CONTINUOUS PRN
Status: DISCONTINUED | OUTPATIENT
Start: 2022-08-26 | End: 2022-08-29

## 2022-08-26 RX ORDER — PROPOFOL 10 MG/ML
VIAL (ML) INTRAVENOUS CONTINUOUS PRN
Status: DISCONTINUED | OUTPATIENT
Start: 2022-08-26 | End: 2022-08-26 | Stop reason: SURG

## 2022-08-26 RX ORDER — ACETAMINOPHEN 325 MG/1
650 TABLET ORAL EVERY 4 HOURS
Status: DISCONTINUED | OUTPATIENT
Start: 2022-08-26 | End: 2022-08-27

## 2022-08-26 RX ORDER — PAPAVERINE HYDROCHLORIDE 30 MG/ML
INJECTION INTRAMUSCULAR; INTRAVENOUS AS NEEDED
Status: DISCONTINUED | OUTPATIENT
Start: 2022-08-26 | End: 2022-08-26 | Stop reason: HOSPADM

## 2022-08-26 RX ORDER — OXYCODONE HYDROCHLORIDE 5 MG/1
10 TABLET ORAL EVERY 4 HOURS PRN
Status: DISPENSED | OUTPATIENT
Start: 2022-08-26 | End: 2022-09-05

## 2022-08-26 RX ORDER — PANTOPRAZOLE SODIUM 40 MG/10ML
40 INJECTION, POWDER, LYOPHILIZED, FOR SOLUTION INTRAVENOUS ONCE
Status: COMPLETED | OUTPATIENT
Start: 2022-08-26 | End: 2022-08-26

## 2022-08-26 RX ORDER — ALPRAZOLAM 0.25 MG/1
0.25 TABLET ORAL EVERY 8 HOURS PRN
Status: DISPENSED | OUTPATIENT
Start: 2022-08-26 | End: 2022-09-05

## 2022-08-26 RX ORDER — ACETAMINOPHEN 10 MG/ML
1000 INJECTION, SOLUTION INTRAVENOUS EVERY 8 HOURS
Status: DISCONTINUED | OUTPATIENT
Start: 2022-08-26 | End: 2022-09-02

## 2022-08-26 RX ORDER — MAGNESIUM SULFATE 1 G/100ML
1 INJECTION INTRAVENOUS EVERY 8 HOURS
Status: COMPLETED | OUTPATIENT
Start: 2022-08-26 | End: 2022-08-27

## 2022-08-26 RX ORDER — HYDROCODONE BITARTRATE AND ACETAMINOPHEN 5; 325 MG/1; MG/1
2 TABLET ORAL EVERY 4 HOURS PRN
Status: DISPENSED | OUTPATIENT
Start: 2022-08-26 | End: 2022-09-05

## 2022-08-26 RX ORDER — POTASSIUM CHLORIDE 750 MG/1
40 TABLET, FILM COATED, EXTENDED RELEASE ORAL AS NEEDED
Status: DISCONTINUED | OUTPATIENT
Start: 2022-08-26 | End: 2022-08-31

## 2022-08-26 RX ORDER — ATORVASTATIN CALCIUM 20 MG/1
40 TABLET, FILM COATED ORAL NIGHTLY
Status: DISCONTINUED | OUTPATIENT
Start: 2022-08-26 | End: 2022-09-01

## 2022-08-26 RX ORDER — SODIUM CHLORIDE 9 MG/ML
INJECTION, SOLUTION INTRAVENOUS CONTINUOUS PRN
Status: DISCONTINUED | OUTPATIENT
Start: 2022-08-26 | End: 2022-08-26 | Stop reason: SURG

## 2022-08-26 RX ORDER — CEFAZOLIN SODIUM IN 0.9 % NACL 3 G/100 ML
3 INTRAVENOUS SOLUTION, PIGGYBACK (ML) INTRAVENOUS EVERY 8 HOURS
Status: COMPLETED | OUTPATIENT
Start: 2022-08-26 | End: 2022-08-28

## 2022-08-26 RX ORDER — BISACODYL 5 MG/1
10 TABLET, DELAYED RELEASE ORAL DAILY PRN
Status: DISCONTINUED | OUTPATIENT
Start: 2022-08-26 | End: 2022-09-09 | Stop reason: HOSPADM

## 2022-08-26 RX ORDER — BUPIVACAINE HCL/0.9 % NACL/PF 0.125 %
PLASTIC BAG, INJECTION (ML) EPIDURAL AS NEEDED
Status: DISCONTINUED | OUTPATIENT
Start: 2022-08-26 | End: 2022-08-26 | Stop reason: SURG

## 2022-08-26 RX ORDER — PROTAMINE SULFATE 10 MG/ML
INJECTION, SOLUTION INTRAVENOUS AS NEEDED
Status: DISCONTINUED | OUTPATIENT
Start: 2022-08-26 | End: 2022-08-26 | Stop reason: HOSPADM

## 2022-08-26 RX ORDER — BISACODYL 10 MG
10 SUPPOSITORY, RECTAL RECTAL DAILY PRN
Status: DISCONTINUED | OUTPATIENT
Start: 2022-08-27 | End: 2022-09-09 | Stop reason: HOSPADM

## 2022-08-26 RX ORDER — MIDAZOLAM HYDROCHLORIDE 1 MG/ML
2 INJECTION INTRAMUSCULAR; INTRAVENOUS
Status: DISCONTINUED | OUTPATIENT
Start: 2022-08-26 | End: 2022-08-27

## 2022-08-26 RX ORDER — MILRINONE LACTATE 0.2 MG/ML
.25-.375 INJECTION, SOLUTION INTRAVENOUS CONTINUOUS PRN
Status: DISCONTINUED | OUTPATIENT
Start: 2022-08-26 | End: 2022-08-29

## 2022-08-26 RX ORDER — DEXMEDETOMIDINE HYDROCHLORIDE 4 UG/ML
.2-1.5 INJECTION INTRAVENOUS
Status: DISCONTINUED | OUTPATIENT
Start: 2022-08-26 | End: 2022-08-27

## 2022-08-26 RX ORDER — CHLORHEXIDINE GLUCONATE 0.12 MG/ML
15 RINSE ORAL EVERY 12 HOURS
Status: DISCONTINUED | OUTPATIENT
Start: 2022-08-26 | End: 2022-09-09 | Stop reason: HOSPADM

## 2022-08-26 RX ORDER — ACETAMINOPHEN 650 MG/1
650 SUPPOSITORY RECTAL EVERY 4 HOURS
Status: DISCONTINUED | OUTPATIENT
Start: 2022-08-26 | End: 2022-08-27

## 2022-08-26 RX ADMIN — NITROGLYCERIN 100 MCG: 20 INJECTION INTRAVENOUS at 11:31

## 2022-08-26 RX ADMIN — DEXMEDETOMIDINE HYDROCHLORIDE 0.5 MCG/KG/HR: 4 INJECTION INTRAVENOUS at 13:39

## 2022-08-26 RX ADMIN — AMINOCAPROIC ACID 10 G: 250 INJECTION, SOLUTION INTRAVENOUS at 11:27

## 2022-08-26 RX ADMIN — HYDROCODONE BITARTRATE AND ACETAMINOPHEN 2 TABLET: 5; 325 TABLET ORAL at 23:14

## 2022-08-26 RX ADMIN — ALBUMIN (HUMAN) 250 ML: 12.5 INJECTION, SOLUTION INTRAVENOUS at 15:39

## 2022-08-26 RX ADMIN — SODIUM CHLORIDE 30 ML/HR: 9 INJECTION, SOLUTION INTRAVENOUS at 13:42

## 2022-08-26 RX ADMIN — ACETAMINOPHEN 1000 MG: 10 INJECTION, SOLUTION INTRAVENOUS at 23:14

## 2022-08-26 RX ADMIN — NICARDIPINE HYDROCHLORIDE 2 MG/HR: 2.5 INJECTION, SOLUTION INTRAVENOUS at 08:36

## 2022-08-26 RX ADMIN — ROCURONIUM BROMIDE 20 MG: 50 INJECTION INTRAVENOUS at 11:54

## 2022-08-26 RX ADMIN — CEFAZOLIN 3 G: 10 INJECTION, POWDER, FOR SOLUTION INTRAVENOUS at 19:23

## 2022-08-26 RX ADMIN — PHENYLEPHRINE-NACL PREF SYR 1 MG/10ML-0.9% (100 MCG/ML) 100 MCG: 1-0.9/1 SOLUTION PREFILLED SYRINGE at 07:51

## 2022-08-26 RX ADMIN — METHADONE HYDROCHLORIDE 10 MG: 10 INJECTION, SOLUTION INTRAMUSCULAR; INTRAVENOUS; SUBCUTANEOUS at 08:07

## 2022-08-26 RX ADMIN — DEXMEDETOMIDINE HYDROCHLORIDE 1 MCG/KG/HR: 4 INJECTION, SOLUTION INTRAVENOUS at 07:55

## 2022-08-26 RX ADMIN — NICARDIPINE HYDROCHLORIDE 0.5 MG: 2.5 INJECTION, SOLUTION INTRAVENOUS at 11:43

## 2022-08-26 RX ADMIN — ALBUMIN (HUMAN) 250 ML: 12.5 INJECTION, SOLUTION INTRAVENOUS at 14:54

## 2022-08-26 RX ADMIN — AMINOCAPROIC ACID 10 G: 250 INJECTION, SOLUTION INTRAVENOUS at 08:13

## 2022-08-26 RX ADMIN — PHENYLEPHRINE HYDROCHLORIDE 0.3 MCG/KG/MIN: 10 INJECTION INTRAVENOUS at 10:29

## 2022-08-26 RX ADMIN — PROPOFOL 150 MG: 10 INJECTION, EMULSION INTRAVENOUS at 07:27

## 2022-08-26 RX ADMIN — ATORVASTATIN CALCIUM 40 MG: 20 TABLET, FILM COATED ORAL at 21:05

## 2022-08-26 RX ADMIN — CHLORHEXIDINE GLUCONATE 1 APPLICATION: 500 CLOTH TOPICAL at 06:59

## 2022-08-26 RX ADMIN — METOPROLOL TARTRATE 12.5 MG: 25 TABLET, FILM COATED ORAL at 21:05

## 2022-08-26 RX ADMIN — NITROGLYCERIN 100 MCG: 20 INJECTION INTRAVENOUS at 11:26

## 2022-08-26 RX ADMIN — NITROGLYCERIN 100 MCG: 20 INJECTION INTRAVENOUS at 08:49

## 2022-08-26 RX ADMIN — SODIUM CHLORIDE: 9 INJECTION, SOLUTION INTRAVENOUS at 07:08

## 2022-08-26 RX ADMIN — ROCURONIUM BROMIDE 10 MG: 50 INJECTION INTRAVENOUS at 10:02

## 2022-08-26 RX ADMIN — HEPARIN SODIUM 30000 UNITS: 1000 INJECTION INTRAVENOUS; SUBCUTANEOUS at 09:57

## 2022-08-26 RX ADMIN — NICARDIPINE HYDROCHLORIDE 0.5 MG: 2.5 INJECTION, SOLUTION INTRAVENOUS at 11:30

## 2022-08-26 RX ADMIN — MUPIROCIN 1 APPLICATION: 20 OINTMENT TOPICAL at 06:39

## 2022-08-26 RX ADMIN — Medication 0.01 MCG/KG/MIN: at 18:00

## 2022-08-26 RX ADMIN — ALBUMIN (HUMAN) 250 ML: 12.5 INJECTION, SOLUTION INTRAVENOUS at 17:29

## 2022-08-26 RX ADMIN — PANTOPRAZOLE SODIUM 40 MG: 40 INJECTION, POWDER, FOR SOLUTION INTRAVENOUS at 14:29

## 2022-08-26 RX ADMIN — ROCURONIUM BROMIDE 50 MG: 50 INJECTION INTRAVENOUS at 07:35

## 2022-08-26 RX ADMIN — ACETAMINOPHEN 1000 MG: 10 INJECTION, SOLUTION INTRAVENOUS at 14:55

## 2022-08-26 RX ADMIN — Medication 40 MCG/KG/MIN: at 09:23

## 2022-08-26 RX ADMIN — PHENYLEPHRINE-NACL PREF SYR 1 MG/10ML-0.9% (100 MCG/ML) 100 MCG: 1-0.9/1 SOLUTION PREFILLED SYRINGE at 09:57

## 2022-08-26 RX ADMIN — SUCCINYLCHOLINE CHLORIDE 160 MG: 20 INJECTION, SOLUTION INTRAMUSCULAR; INTRAVENOUS; PARENTERAL at 07:29

## 2022-08-26 RX ADMIN — SODIUM CHLORIDE: 9 INJECTION, SOLUTION INTRAVENOUS at 07:56

## 2022-08-26 RX ADMIN — METOCLOPRAMIDE HYDROCHLORIDE 10 MG: 5 INJECTION INTRAMUSCULAR; INTRAVENOUS at 14:29

## 2022-08-26 RX ADMIN — MORPHINE SULFATE 4 MG: 2 INJECTION, SOLUTION INTRAMUSCULAR; INTRAVENOUS at 16:36

## 2022-08-26 RX ADMIN — PROPOFOL 50 MG: 10 INJECTION, EMULSION INTRAVENOUS at 07:39

## 2022-08-26 RX ADMIN — METHADONE HYDROCHLORIDE 10 MG: 10 INJECTION, SOLUTION INTRAMUSCULAR; INTRAVENOUS; SUBCUTANEOUS at 07:16

## 2022-08-26 RX ADMIN — LIDOCAINE HYDROCHLORIDE 60 MG: 20 INJECTION, SOLUTION INFILTRATION; PERINEURAL at 07:25

## 2022-08-26 RX ADMIN — CEFAZOLIN SODIUM 3 G: 2 INJECTION, SOLUTION INTRAVENOUS at 08:06

## 2022-08-26 RX ADMIN — CHLORHEXIDINE GLUCONATE 15 ML: 1.2 SOLUTION ORAL at 21:05

## 2022-08-26 RX ADMIN — MAGNESIUM SULFATE IN DEXTROSE 1 G: 10 INJECTION, SOLUTION INTRAVENOUS at 21:06

## 2022-08-26 RX ADMIN — PHENYLEPHRINE-NACL PREF SYR 1 MG/10ML-0.9% (100 MCG/ML) 100 MCG: 1-0.9/1 SOLUTION PREFILLED SYRINGE at 08:12

## 2022-08-26 RX ADMIN — ROCURONIUM BROMIDE 20 MG: 50 INJECTION INTRAVENOUS at 10:57

## 2022-08-26 RX ADMIN — CEFAZOLIN SODIUM 3 G: 2 INJECTION, SOLUTION INTRAVENOUS at 11:34

## 2022-08-26 RX ADMIN — MAGNESIUM SULFATE IN DEXTROSE 1 G: 10 INJECTION, SOLUTION INTRAVENOUS at 14:17

## 2022-08-26 RX ADMIN — MUPIROCIN 1 APPLICATION: 20 OINTMENT TOPICAL at 21:08

## 2022-08-26 RX ADMIN — CHLORHEXIDINE GLUCONATE 15 ML: 1.2 SOLUTION ORAL at 06:38

## 2022-08-26 NOTE — ANESTHESIA POSTPROCEDURE EVALUATION
Patient: Chaitanya Montesinos    Procedure Summary     Date: 08/26/22 Room / Location: [place] [address] / [facility] CARDIOVASCULAR OPERATING ROOM    Anesthesia Start: 0708 Anesthesia Stop: 1303    Procedures:       MEL STERNOTOMY CORONARY ARTERY BYPASS GRAFT TIMES 4 USING LEFT INTERNAL MAMMARY ARTERY AND LEFT GREATER SAPHENOUS VEIN GRAFT PER ENDOSCOPIC VEIN HARVESTING AND PRP (N/A Chest)      CYSTOSCOPY WITH SUNG CATHETER PLACEMENT (N/A Urethra) Diagnosis:       CAD (coronary artery disease)      (CAD (coronary artery disease) [I25.10])    Surgeons: Jr Wayne Ruiz MD; Westley Contreras MD Provider: Ab Quijano MD    Anesthesia Type: general ASA Status: 4          Anesthesia Type: general    Vitals  Vitals Value Taken Time   /65 08/26/22 1401   Temp 35.7 °C (96.26 °F) 08/26/22 1426   Pulse 80 08/26/22 1426   Resp     SpO2 92 % 08/26/22 1426   Vitals shown include unvalidated device data.        Post Anesthesia Care and Evaluation    Patient location during evaluation: bedside  Pain management: adequate    Airway patency: patent  Anesthetic complications: No anesthetic complications    Cardiovascular status: acceptable  Respiratory status: acceptable  Hydration status: acceptable

## 2022-08-26 NOTE — ANESTHESIA PROCEDURE NOTES
Airway  Date/Time: 8/26/2022 7:31 AM    General Information and Staff    Patient location during procedure: OR  Anesthesiologist: Ab Quijano MD    Indications and Patient Condition    Preoxygenated: yes  Mask difficulty assessment: 2 - vent by mask + OA or adjuvant +/- NMBA    Final Airway Details  Final airway type: endotracheal airway      Successful airway: ETT  Cuffed: yes   Successful intubation technique: video laryngoscopy  Facilitating devices/methods: intubating stylet  Endotracheal tube insertion site: oral  Blade: CMAC  Blade size: D  ETT size (mm): 8.0  Cormack-Lehane Classification: grade I - full view of glottis  Placement verified by: capnometry   Measured from: teeth  ETT/EBT  to teeth (cm): 23  Number of attempts at approach: 1  Assessment: lips, teeth, and gum same as pre-op and atraumatic intubation

## 2022-08-26 NOTE — ANESTHESIA PROCEDURE NOTES
[place]-josé miguel catheter      Patient reassessed immediately prior to procedure    Start time: 8/26/2022 7:50 AM  Stop Time:8/26/2022 7:55 AM  Staff  Anesthesiologist: Ab Quijano MD  Preanesthetic Checklist  Completed: patient identified, risks and benefits discussed, surgical consent, pre-op evaluation and timeout performed  Central Line Prep  Sterile Tech:cap, gloves, gown, mask and sterile barriers  Prep: chloraprep  Patient monitoring: blood pressure monitoring, continuous pulse oximetry and EKG  Central Line Procedure  Laterality:right  Location:internal jugular  Catheter Type:[place]-José Miguel  Assessment  Post procedure:biopatch applied, line sutured and occlusive dressing applied  Assessement:blood return through all ports and free fluid flow  Patient Tolerance:patient tolerated the procedure well with no apparent complications

## 2022-08-26 NOTE — ANESTHESIA PROCEDURE NOTES
Central Line      Patient reassessed immediately prior to procedure    Patient location during procedure: OR  Start time: 8/26/2022 7:40 AM  Stop Time:8/26/2022 7:50 AM  Indications: vascular access and central pressure monitoring  Staff  Anesthesiologist: Ab Quijano MD  Preanesthetic Checklist  Completed: patient identified and risks and benefits discussed  Central Line Prep  Sterile Tech:cap, gloves, gown, mask and sterile barriers  Prep: chloraprep  Patient monitoring: blood pressure monitoring, continuous pulse oximetry and EKG  Central Line Procedure  Laterality:right  Location:internal jugular  Catheter Type:double lumen and MAC  Catheter Size:9 Fr  Guidance:ultrasound guided  PROCEDURE NOTE/ULTRASOUND INTERPRETATION.  Using ultrasound guidance the potential vascular sites for insertion of the catheter were visualized to determine the patency of the vessel to be used for vascular access.  After selecting the appropriate site for insertion, the needle was visualized under ultrasound being inserted into the internal jugular vein, followed by ultrasound confirmation of wire and catheter placement. There were no abnormalities seen on ultrasound; an image was taken; and the patient tolerated the procedure with no complications. Images: still images obtained, printed/placed on chart  Assessment  Post procedure:biopatch applied, line sutured, occlusive dressing applied and secured with tape  Assessement:blood return through all ports and Matty Test  Complications:no  Patient Tolerance:patient tolerated the procedure well with no apparent complications  Additional Notes  Ultrasound Interpretation:  Using ultrasound guidance the potential vascular sites for insertion of the catheter were visualized to determine the patency of the vessel to be used for vascular access.  After selecting the appropriate site for insertion, the needle was visualized under ultrasound being inserted into the vessel, followed by  ultrasound confirmation of wire and catheter placement.  There were no abnormalities seen on ultrasound; an image was taken/ and the patient tolerated the procedure with no complications.

## 2022-08-26 NOTE — ANESTHESIA PREPROCEDURE EVALUATION
Anesthesia Evaluation     history of anesthetic complications: difficult airway  NPO Solid Status: > 8 hours             Airway   Mallampati: III  TM distance: >3 FB  Neck ROM: limited  Difficult intubation highly probable, Small opening and Large neck circumference  Dental - normal exam     Pulmonary - normal exam   Cardiovascular - normal exam    (+) hypertension, CAD, hyperlipidemia,       Neuro/Psych  GI/Hepatic/Renal/Endo    (+) morbid obesity,      Musculoskeletal     Abdominal    Substance History      OB/GYN          Other                        Anesthesia Plan    ASA 4     general     (Art/cvp/pa/MEL    I have reviewed the patient's history with the patient and the chart, including all pertinent laboratory results and imaging. I have explained the risks of anesthesia including but not limited to dental damage, sore throat, nausea, corneal abrasion, nerve injury, MI, stroke, and death.  )  Postoperative Plan: Expected vent after surgery  Anesthetic plan, risks, benefits, and alternatives have been provided, discussed and informed consent has been obtained with: patient.        CODE STATUS:    Level Of Support Discussed With: Patient  Code Status (Patient has no pulse and is not breathing): CPR (Attempt to Resuscitate)  Medical Interventions (Patient has pulse or is breathing): Full Support  Release to patient: Routine Release

## 2022-08-26 NOTE — ANESTHESIA PROCEDURE NOTES
Diagnostic Intraoperative MEL with 2d and 3d imaging and follow up color doppler study    Procedure Performed: Diagnostic Intraoperative MEL with 2d and 3d imaging and follow up color doppler study       Start Time:  8/26/2022 8:25 AM       End Time:   8/26/2022 8:55 AM      General Procedure Information  Physician Requesting Echo: Jr Wayne Ruiz MD  Location performed:  OR  Intubated  Bite block not placed  Heart visualized  Probe Insertion:  Easy  Modalities:  2D only, color flow mapping, continuous wave Doppler and pulse wave Doppler    Echocardiographic and Doppler Measurements    Ventricles    Left Ventricle:  Global Function normal.  Ejection Fraction 60%.                                  Anesthesia Information      Echocardiogram Comments:       Diagnosis: non-rheumatic mitral regurgitation

## 2022-08-26 NOTE — ANESTHESIA PROCEDURE NOTES
Arterial Line      Patient reassessed immediately prior to procedure    Patient location during procedure: OR  Start time: 8/26/2022 7:20 AM  Stop Time:8/26/2022 7:24 AM       Performed By   Anesthesiologist: Ab Quijano MD  Preanesthetic Checklist  Completed: patient identified, risks and benefits discussed, surgical consent, pre-op evaluation and timeout performed  Arterial Line Prep   Sterile Tech: cap, gloves and mask  Prep: alcohol swabs and ChloraPrep  Patient monitoring: blood pressure monitoring, continuous pulse oximetry and EKG  Arterial Line Procedure   Laterality:left  Location:  radial artery  Catheter size: 20 G   Guidance: ultrasound guided  PROCEDURE NOTE/ULTRASOUND INTERPRETATION.  Using ultrasound guidance the potential vascular sites for insertion of the catheter were visualized to determine the patency of the vessel to be used for vascular access.  After selecting the appropriate site for insertion, the needle was visualized under ultrasound being inserted into the radial artery, followed by ultrasound confirmation of wire and catheter placement. There were no abnormalities seen on ultrasound; an image was taken; and the patient tolerated the procedure with no complications.   Number of attempts: 1  Successful placement: yes  Post Assessment   Dressing Type: occlusive dressing applied and secured with tape.   Complications no  Patient Tolerance: patient tolerated the procedure well with no apparent complications  Additional Notes  Ultrasound Interpretation:  Using ultrasound guidance the potential vascular sites for insertion of the catheter were visualized to determine the patency of the vessel to be used for vascular access.  After selecting the appropriate site for insertion, the needle was visualized under ultrasound being inserted into the vessel, followed by ultrasound confirmation of wire and catheter placement.  There were no abnormalities seen on ultrasound; an image was taken/  and the patient tolerated the procedure with no complications.

## 2022-08-27 ENCOUNTER — APPOINTMENT (OUTPATIENT)
Dept: GENERAL RADIOLOGY | Facility: HOSPITAL | Age: 77
End: 2022-08-27

## 2022-08-27 LAB
ALBUMIN SERPL-MCNC: 4.1 G/DL (ref 3.5–5.2)
ANION GAP SERPL CALCULATED.3IONS-SCNC: 11.4 MMOL/L (ref 5–15)
BACTERIA SPEC AEROBE CULT: NO GROWTH
BACTERIA SPEC AEROBE CULT: NO GROWTH
BASOPHILS # BLD AUTO: 0.04 10*3/MM3 (ref 0–0.2)
BASOPHILS NFR BLD AUTO: 0.2 % (ref 0–1.5)
BUN SERPL-MCNC: 21 MG/DL (ref 8–23)
BUN/CREAT SERPL: 15.2 (ref 7–25)
CA-I BLD-MCNC: 5.1 MG/DL (ref 4.6–5.4)
CA-I SERPL ISE-MCNC: 1.27 MMOL/L (ref 1.15–1.35)
CALCIUM SPEC-SCNC: 8.7 MG/DL (ref 8.6–10.5)
CHLORIDE SERPL-SCNC: 106 MMOL/L (ref 98–107)
CO2 SERPL-SCNC: 20.6 MMOL/L (ref 22–29)
CREAT SERPL-MCNC: 1.38 MG/DL (ref 0.76–1.27)
DEPRECATED RDW RBC AUTO: 45.3 FL (ref 37–54)
EGFRCR SERPLBLD CKD-EPI 2021: 52 ML/MIN/1.73
EOSINOPHIL # BLD AUTO: 0 10*3/MM3 (ref 0–0.4)
EOSINOPHIL NFR BLD AUTO: 0 % (ref 0.3–6.2)
ERYTHROCYTE [DISTWIDTH] IN BLOOD BY AUTOMATED COUNT: 13.1 % (ref 12.3–15.4)
GLUCOSE BLDC GLUCOMTR-MCNC: 146 MG/DL (ref 70–130)
GLUCOSE BLDC GLUCOMTR-MCNC: 158 MG/DL (ref 70–130)
GLUCOSE BLDC GLUCOMTR-MCNC: 160 MG/DL (ref 70–130)
GLUCOSE BLDC GLUCOMTR-MCNC: 166 MG/DL (ref 70–130)
GLUCOSE SERPL-MCNC: 173 MG/DL (ref 65–99)
HCT VFR BLD AUTO: 38.4 % (ref 37.5–51)
HGB BLD-MCNC: 12.8 G/DL (ref 13–17.7)
IMM GRANULOCYTES # BLD AUTO: 0.09 10*3/MM3 (ref 0–0.05)
IMM GRANULOCYTES NFR BLD AUTO: 0.5 % (ref 0–0.5)
INR PPP: 1.27 (ref 0.9–1.1)
LYMPHOCYTES # BLD AUTO: 0.5 10*3/MM3 (ref 0.7–3.1)
LYMPHOCYTES NFR BLD AUTO: 2.8 % (ref 19.6–45.3)
MAGNESIUM SERPL-MCNC: 2.2 MG/DL (ref 1.6–2.4)
MCH RBC QN AUTO: 31.3 PG (ref 26.6–33)
MCHC RBC AUTO-ENTMCNC: 33.3 G/DL (ref 31.5–35.7)
MCV RBC AUTO: 93.9 FL (ref 79–97)
MONOCYTES # BLD AUTO: 1.97 10*3/MM3 (ref 0.1–0.9)
MONOCYTES NFR BLD AUTO: 11 % (ref 5–12)
NEUTROPHILS NFR BLD AUTO: 15.38 10*3/MM3 (ref 1.7–7)
NEUTROPHILS NFR BLD AUTO: 85.5 % (ref 42.7–76)
NRBC BLD AUTO-RTO: 0 /100 WBC (ref 0–0.2)
PHOSPHATE SERPL-MCNC: 4.4 MG/DL (ref 2.5–4.5)
PLATELET # BLD AUTO: 185 10*3/MM3 (ref 140–450)
PMV BLD AUTO: 10.2 FL (ref 6–12)
POTASSIUM SERPL-SCNC: 4.2 MMOL/L (ref 3.5–5.2)
PROTHROMBIN TIME: 15.7 SECONDS (ref 11.7–14.2)
RBC # BLD AUTO: 4.09 10*6/MM3 (ref 4.14–5.8)
SODIUM SERPL-SCNC: 138 MMOL/L (ref 136–145)
WBC NRBC COR # BLD: 17.98 10*3/MM3 (ref 3.4–10.8)

## 2022-08-27 PROCEDURE — 93010 ELECTROCARDIOGRAM REPORT: CPT | Performed by: INTERNAL MEDICINE

## 2022-08-27 PROCEDURE — 93005 ELECTROCARDIOGRAM TRACING: CPT | Performed by: NURSE PRACTITIONER

## 2022-08-27 PROCEDURE — 82962 GLUCOSE BLOOD TEST: CPT

## 2022-08-27 PROCEDURE — 94799 UNLISTED PULMONARY SVC/PX: CPT

## 2022-08-27 PROCEDURE — P9041 ALBUMIN (HUMAN),5%, 50ML: HCPCS | Performed by: NURSE PRACTITIONER

## 2022-08-27 PROCEDURE — 97162 PT EVAL MOD COMPLEX 30 MIN: CPT

## 2022-08-27 PROCEDURE — 25010000002 ONDANSETRON PER 1 MG: Performed by: NURSE PRACTITIONER

## 2022-08-27 PROCEDURE — 71045 X-RAY EXAM CHEST 1 VIEW: CPT

## 2022-08-27 PROCEDURE — 25010000002 ALBUMIN HUMAN 5% PER 50 ML: Performed by: NURSE PRACTITIONER

## 2022-08-27 PROCEDURE — 25010000002 METOCLOPRAMIDE PER 10 MG: Performed by: NURSE PRACTITIONER

## 2022-08-27 PROCEDURE — 83735 ASSAY OF MAGNESIUM: CPT | Performed by: NURSE PRACTITIONER

## 2022-08-27 PROCEDURE — 85610 PROTHROMBIN TIME: CPT | Performed by: NURSE PRACTITIONER

## 2022-08-27 PROCEDURE — 25010000002 ENOXAPARIN PER 10 MG: Performed by: NURSE PRACTITIONER

## 2022-08-27 PROCEDURE — 25010000002 CALCIUM GLUCONATE-NACL 1-0.675 GM/50ML-% SOLUTION: Performed by: NURSE PRACTITIONER

## 2022-08-27 PROCEDURE — 82330 ASSAY OF CALCIUM: CPT | Performed by: NURSE PRACTITIONER

## 2022-08-27 PROCEDURE — 85025 COMPLETE CBC W/AUTO DIFF WBC: CPT | Performed by: NURSE PRACTITIONER

## 2022-08-27 PROCEDURE — 25010000002 MAGNESIUM SULFATE IN D5W 1G/100ML (PREMIX) 1-5 GM/100ML-% SOLUTION: Performed by: NURSE PRACTITIONER

## 2022-08-27 PROCEDURE — 80069 RENAL FUNCTION PANEL: CPT | Performed by: NURSE PRACTITIONER

## 2022-08-27 PROCEDURE — 25010000002 CEFAZOLIN PER 500 MG: Performed by: NURSE PRACTITIONER

## 2022-08-27 PROCEDURE — 97530 THERAPEUTIC ACTIVITIES: CPT

## 2022-08-27 RX ORDER — CALCIUM GLUCONATE 20 MG/ML
2 INJECTION, SOLUTION INTRAVENOUS ONCE
Status: COMPLETED | OUTPATIENT
Start: 2022-08-27 | End: 2022-08-27

## 2022-08-27 RX ORDER — INSULIN LISPRO 100 [IU]/ML
0-7 INJECTION, SOLUTION INTRAVENOUS; SUBCUTANEOUS
Status: DISCONTINUED | OUTPATIENT
Start: 2022-08-27 | End: 2022-08-29

## 2022-08-27 RX ORDER — NICOTINE POLACRILEX 4 MG
15 LOZENGE BUCCAL
Status: DISCONTINUED | OUTPATIENT
Start: 2022-08-27 | End: 2022-08-29

## 2022-08-27 RX ORDER — SODIUM BICARBONATE 650 MG/1
650 TABLET ORAL ONCE
Status: COMPLETED | OUTPATIENT
Start: 2022-08-27 | End: 2022-08-27

## 2022-08-27 RX ORDER — ALBUMIN, HUMAN INJ 5% 5 %
250 SOLUTION INTRAVENOUS ONCE
Status: COMPLETED | OUTPATIENT
Start: 2022-08-27 | End: 2022-08-27

## 2022-08-27 RX ORDER — DEXTROSE MONOHYDRATE 25 G/50ML
25 INJECTION, SOLUTION INTRAVENOUS
Status: DISCONTINUED | OUTPATIENT
Start: 2022-08-27 | End: 2022-08-29

## 2022-08-27 RX ORDER — GUAIFENESIN 600 MG/1
1200 TABLET, EXTENDED RELEASE ORAL EVERY 12 HOURS SCHEDULED
Status: DISCONTINUED | OUTPATIENT
Start: 2022-08-27 | End: 2022-09-01

## 2022-08-27 RX ADMIN — CHLORHEXIDINE GLUCONATE 15 ML: 1.2 SOLUTION ORAL at 11:44

## 2022-08-27 RX ADMIN — ALBUMIN (HUMAN) 250 ML: 12.5 INJECTION, SOLUTION INTRAVENOUS at 08:42

## 2022-08-27 RX ADMIN — METOPROLOL TARTRATE 12.5 MG: 25 TABLET, FILM COATED ORAL at 20:37

## 2022-08-27 RX ADMIN — SODIUM BICARBONATE 650 MG: 650 TABLET ORAL at 11:44

## 2022-08-27 RX ADMIN — METOCLOPRAMIDE HYDROCHLORIDE 10 MG: 5 INJECTION INTRAMUSCULAR; INTRAVENOUS at 07:54

## 2022-08-27 RX ADMIN — ENOXAPARIN SODIUM 40 MG: 100 INJECTION SUBCUTANEOUS at 18:09

## 2022-08-27 RX ADMIN — MAGNESIUM SULFATE IN DEXTROSE 1 G: 10 INJECTION, SOLUTION INTRAVENOUS at 06:16

## 2022-08-27 RX ADMIN — HYDROCODONE BITARTRATE AND ACETAMINOPHEN 2 TABLET: 5; 325 TABLET ORAL at 03:04

## 2022-08-27 RX ADMIN — OXYCODONE 10 MG: 5 TABLET ORAL at 18:08

## 2022-08-27 RX ADMIN — ACETAMINOPHEN 650 MG: 325 TABLET, FILM COATED ORAL at 02:02

## 2022-08-27 RX ADMIN — OXYCODONE 10 MG: 5 TABLET ORAL at 12:50

## 2022-08-27 RX ADMIN — ONDANSETRON 4 MG: 2 INJECTION INTRAMUSCULAR; INTRAVENOUS at 07:20

## 2022-08-27 RX ADMIN — CEFAZOLIN 3 G: 10 INJECTION, POWDER, FOR SOLUTION INTRAVENOUS at 03:20

## 2022-08-27 RX ADMIN — DOCUSATE SODIUM 50MG AND SENNOSIDES 8.6MG 2 TABLET: 8.6; 5 TABLET, FILM COATED ORAL at 20:36

## 2022-08-27 RX ADMIN — PANTOPRAZOLE SODIUM 40 MG: 40 TABLET, DELAYED RELEASE ORAL at 06:16

## 2022-08-27 RX ADMIN — ASPIRIN 81 MG: 81 TABLET, COATED ORAL at 08:00

## 2022-08-27 RX ADMIN — ACETAMINOPHEN 1000 MG: 10 INJECTION, SOLUTION INTRAVENOUS at 15:39

## 2022-08-27 RX ADMIN — ATORVASTATIN CALCIUM 40 MG: 20 TABLET, FILM COATED ORAL at 20:36

## 2022-08-27 RX ADMIN — CEFAZOLIN 3 G: 10 INJECTION, POWDER, FOR SOLUTION INTRAVENOUS at 18:09

## 2022-08-27 RX ADMIN — CALCIUM GLUCONATE 2 G: 20 INJECTION, SOLUTION INTRAVENOUS at 08:43

## 2022-08-27 RX ADMIN — GUAIFENESIN 1200 MG: 600 TABLET, EXTENDED RELEASE ORAL at 08:43

## 2022-08-27 RX ADMIN — MUPIROCIN 1 APPLICATION: 20 OINTMENT TOPICAL at 08:43

## 2022-08-27 RX ADMIN — ACETAMINOPHEN 1000 MG: 10 INJECTION, SOLUTION INTRAVENOUS at 23:55

## 2022-08-27 RX ADMIN — OXYCODONE 10 MG: 5 TABLET ORAL at 07:54

## 2022-08-27 RX ADMIN — CEFAZOLIN 3 G: 10 INJECTION, POWDER, FOR SOLUTION INTRAVENOUS at 12:03

## 2022-08-27 RX ADMIN — METOCLOPRAMIDE HYDROCHLORIDE 10 MG: 5 INJECTION INTRAMUSCULAR; INTRAVENOUS at 02:02

## 2022-08-27 RX ADMIN — CHLORHEXIDINE GLUCONATE 15 ML: 1.2 SOLUTION ORAL at 20:37

## 2022-08-27 RX ADMIN — MUPIROCIN 1 APPLICATION: 20 OINTMENT TOPICAL at 20:38

## 2022-08-27 RX ADMIN — GUAIFENESIN 1200 MG: 600 TABLET, EXTENDED RELEASE ORAL at 20:37

## 2022-08-28 ENCOUNTER — APPOINTMENT (OUTPATIENT)
Dept: GENERAL RADIOLOGY | Facility: HOSPITAL | Age: 77
End: 2022-08-28

## 2022-08-28 LAB
ANION GAP SERPL CALCULATED.3IONS-SCNC: 9.5 MMOL/L (ref 5–15)
BUN SERPL-MCNC: 34 MG/DL (ref 8–23)
BUN/CREAT SERPL: 17.3 (ref 7–25)
CALCIUM SPEC-SCNC: 8.3 MG/DL (ref 8.6–10.5)
CHLORIDE SERPL-SCNC: 105 MMOL/L (ref 98–107)
CO2 SERPL-SCNC: 23.5 MMOL/L (ref 22–29)
CREAT SERPL-MCNC: 1.97 MG/DL (ref 0.76–1.27)
DEPRECATED RDW RBC AUTO: 42.6 FL (ref 37–54)
EGFRCR SERPLBLD CKD-EPI 2021: 33.9 ML/MIN/1.73
ERYTHROCYTE [DISTWIDTH] IN BLOOD BY AUTOMATED COUNT: 12.8 % (ref 12.3–15.4)
GLUCOSE BLDC GLUCOMTR-MCNC: 128 MG/DL (ref 70–130)
GLUCOSE BLDC GLUCOMTR-MCNC: 132 MG/DL (ref 70–130)
GLUCOSE BLDC GLUCOMTR-MCNC: 135 MG/DL (ref 70–130)
GLUCOSE BLDC GLUCOMTR-MCNC: 142 MG/DL (ref 70–130)
GLUCOSE SERPL-MCNC: 129 MG/DL (ref 65–99)
HCT VFR BLD AUTO: 32.7 % (ref 37.5–51)
HGB BLD-MCNC: 11.4 G/DL (ref 13–17.7)
MCH RBC QN AUTO: 32 PG (ref 26.6–33)
MCHC RBC AUTO-ENTMCNC: 34.9 G/DL (ref 31.5–35.7)
MCV RBC AUTO: 91.9 FL (ref 79–97)
PLATELET # BLD AUTO: 128 10*3/MM3 (ref 140–450)
PMV BLD AUTO: 10.4 FL (ref 6–12)
POTASSIUM SERPL-SCNC: 4.2 MMOL/L (ref 3.5–5.2)
RBC # BLD AUTO: 3.56 10*6/MM3 (ref 4.14–5.8)
SODIUM SERPL-SCNC: 138 MMOL/L (ref 136–145)
WBC NRBC COR # BLD: 15.04 10*3/MM3 (ref 3.4–10.8)

## 2022-08-28 PROCEDURE — 97530 THERAPEUTIC ACTIVITIES: CPT

## 2022-08-28 PROCEDURE — 94799 UNLISTED PULMONARY SVC/PX: CPT

## 2022-08-28 PROCEDURE — 25010000002 AMIODARONE IN DEXTROSE 5% 360-4.14 MG/200ML-% SOLUTION: Performed by: NURSE PRACTITIONER

## 2022-08-28 PROCEDURE — 80048 BASIC METABOLIC PNL TOTAL CA: CPT | Performed by: NURSE PRACTITIONER

## 2022-08-28 PROCEDURE — 71045 X-RAY EXAM CHEST 1 VIEW: CPT

## 2022-08-28 PROCEDURE — 25010000002 CEFAZOLIN PER 500 MG: Performed by: NURSE PRACTITIONER

## 2022-08-28 PROCEDURE — 25010000002 AMIODARONE IN DEXTROSE 5% 150-4.21 MG/100ML-% SOLUTION: Performed by: NURSE PRACTITIONER

## 2022-08-28 PROCEDURE — 93005 ELECTROCARDIOGRAM TRACING: CPT | Performed by: THORACIC SURGERY (CARDIOTHORACIC VASCULAR SURGERY)

## 2022-08-28 PROCEDURE — 82962 GLUCOSE BLOOD TEST: CPT

## 2022-08-28 PROCEDURE — 25010000002 ENOXAPARIN PER 10 MG: Performed by: NURSE PRACTITIONER

## 2022-08-28 PROCEDURE — 93005 ELECTROCARDIOGRAM TRACING: CPT | Performed by: NURSE PRACTITIONER

## 2022-08-28 PROCEDURE — 85027 COMPLETE CBC AUTOMATED: CPT | Performed by: NURSE PRACTITIONER

## 2022-08-28 PROCEDURE — 93010 ELECTROCARDIOGRAM REPORT: CPT | Performed by: INTERNAL MEDICINE

## 2022-08-28 RX ADMIN — AMIODARONE HYDROCHLORIDE 1 MG/MIN: 1.8 INJECTION, SOLUTION INTRAVENOUS at 14:16

## 2022-08-28 RX ADMIN — GUAIFENESIN 1200 MG: 600 TABLET, EXTENDED RELEASE ORAL at 08:25

## 2022-08-28 RX ADMIN — CHLORHEXIDINE GLUCONATE 15 ML: 1.2 SOLUTION ORAL at 21:13

## 2022-08-28 RX ADMIN — OXYCODONE 10 MG: 5 TABLET ORAL at 13:22

## 2022-08-28 RX ADMIN — CHLORHEXIDINE GLUCONATE 15 ML: 1.2 SOLUTION ORAL at 08:25

## 2022-08-28 RX ADMIN — OXYCODONE 10 MG: 5 TABLET ORAL at 08:25

## 2022-08-28 RX ADMIN — MUPIROCIN: 20 OINTMENT TOPICAL at 21:51

## 2022-08-28 RX ADMIN — AMIODARONE HYDROCHLORIDE 150 MG: 1.5 INJECTION, SOLUTION INTRAVENOUS at 11:57

## 2022-08-28 RX ADMIN — METOPROLOL TARTRATE 12.5 MG: 25 TABLET, FILM COATED ORAL at 21:13

## 2022-08-28 RX ADMIN — GUAIFENESIN 1200 MG: 600 TABLET, EXTENDED RELEASE ORAL at 21:13

## 2022-08-28 RX ADMIN — MUPIROCIN 1 APPLICATION: 20 OINTMENT TOPICAL at 08:25

## 2022-08-28 RX ADMIN — ACETAMINOPHEN 1000 MG: 10 INJECTION, SOLUTION INTRAVENOUS at 23:54

## 2022-08-28 RX ADMIN — ASPIRIN 81 MG: 81 TABLET, COATED ORAL at 08:25

## 2022-08-28 RX ADMIN — PANTOPRAZOLE SODIUM 40 MG: 40 TABLET, DELAYED RELEASE ORAL at 05:33

## 2022-08-28 RX ADMIN — AMIODARONE HYDROCHLORIDE 0.5 MG/MIN: 1.8 INJECTION, SOLUTION INTRAVENOUS at 19:57

## 2022-08-28 RX ADMIN — ACETAMINOPHEN 1000 MG: 10 INJECTION, SOLUTION INTRAVENOUS at 05:31

## 2022-08-28 RX ADMIN — DOCUSATE SODIUM 50MG AND SENNOSIDES 8.6MG 2 TABLET: 8.6; 5 TABLET, FILM COATED ORAL at 21:13

## 2022-08-28 RX ADMIN — METOPROLOL TARTRATE 12.5 MG: 25 TABLET, FILM COATED ORAL at 08:25

## 2022-08-28 RX ADMIN — CEFAZOLIN 3 G: 10 INJECTION, POWDER, FOR SOLUTION INTRAVENOUS at 02:16

## 2022-08-28 RX ADMIN — ENOXAPARIN SODIUM 40 MG: 100 INJECTION SUBCUTANEOUS at 18:10

## 2022-08-28 RX ADMIN — ACETAMINOPHEN 1000 MG: 10 INJECTION, SOLUTION INTRAVENOUS at 14:16

## 2022-08-28 RX ADMIN — ATORVASTATIN CALCIUM 40 MG: 20 TABLET, FILM COATED ORAL at 21:13

## 2022-08-29 ENCOUNTER — APPOINTMENT (OUTPATIENT)
Dept: GENERAL RADIOLOGY | Facility: HOSPITAL | Age: 77
End: 2022-08-29

## 2022-08-29 LAB
ANION GAP SERPL CALCULATED.3IONS-SCNC: 9.5 MMOL/L (ref 5–15)
BH BB BLOOD EXPIRATION DATE: NORMAL
BH BB BLOOD EXPIRATION DATE: NORMAL
BH BB BLOOD TYPE BARCODE: 600
BH BB BLOOD TYPE BARCODE: 600
BH BB DISPENSE STATUS: NORMAL
BH BB DISPENSE STATUS: NORMAL
BH BB PRODUCT CODE: NORMAL
BH BB PRODUCT CODE: NORMAL
BH BB UNIT NUMBER: NORMAL
BH BB UNIT NUMBER: NORMAL
BUN SERPL-MCNC: 48 MG/DL (ref 8–23)
BUN/CREAT SERPL: 27.3 (ref 7–25)
CALCIUM SPEC-SCNC: 8.6 MG/DL (ref 8.6–10.5)
CHLORIDE SERPL-SCNC: 101 MMOL/L (ref 98–107)
CO2 SERPL-SCNC: 23.5 MMOL/L (ref 22–29)
CREAT SERPL-MCNC: 1.76 MG/DL (ref 0.76–1.27)
CROSSMATCH INTERPRETATION: NORMAL
CROSSMATCH INTERPRETATION: NORMAL
DEPRECATED RDW RBC AUTO: 43.2 FL (ref 37–54)
EGFRCR SERPLBLD CKD-EPI 2021: 38.8 ML/MIN/1.73
ERYTHROCYTE [DISTWIDTH] IN BLOOD BY AUTOMATED COUNT: 12.8 % (ref 12.3–15.4)
GLUCOSE BLDC GLUCOMTR-MCNC: 133 MG/DL (ref 70–130)
GLUCOSE BLDC GLUCOMTR-MCNC: 151 MG/DL (ref 70–130)
GLUCOSE BLDC GLUCOMTR-MCNC: 157 MG/DL (ref 70–130)
GLUCOSE SERPL-MCNC: 119 MG/DL (ref 65–99)
HCT VFR BLD AUTO: 32.5 % (ref 37.5–51)
HGB BLD-MCNC: 11.3 G/DL (ref 13–17.7)
MCH RBC QN AUTO: 31.8 PG (ref 26.6–33)
MCHC RBC AUTO-ENTMCNC: 34.8 G/DL (ref 31.5–35.7)
MCV RBC AUTO: 91.5 FL (ref 79–97)
PLATELET # BLD AUTO: 168 10*3/MM3 (ref 140–450)
PMV BLD AUTO: 10.8 FL (ref 6–12)
POTASSIUM SERPL-SCNC: 4.1 MMOL/L (ref 3.5–5.2)
QT INTERVAL: 375 MS
QT INTERVAL: 435 MS
RBC # BLD AUTO: 3.55 10*6/MM3 (ref 4.14–5.8)
SODIUM SERPL-SCNC: 134 MMOL/L (ref 136–145)
UNIT  ABO: NORMAL
UNIT  ABO: NORMAL
UNIT  RH: NORMAL
UNIT  RH: NORMAL
WBC NRBC COR # BLD: 13.6 10*3/MM3 (ref 3.4–10.8)

## 2022-08-29 PROCEDURE — 93005 ELECTROCARDIOGRAM TRACING: CPT | Performed by: NURSE PRACTITIONER

## 2022-08-29 PROCEDURE — 25010000002 AMIODARONE IN DEXTROSE 5% 360-4.14 MG/200ML-% SOLUTION: Performed by: NURSE PRACTITIONER

## 2022-08-29 PROCEDURE — 85027 COMPLETE CBC AUTOMATED: CPT | Performed by: NURSE PRACTITIONER

## 2022-08-29 PROCEDURE — 71046 X-RAY EXAM CHEST 2 VIEWS: CPT

## 2022-08-29 PROCEDURE — 94799 UNLISTED PULMONARY SVC/PX: CPT

## 2022-08-29 PROCEDURE — 25010000002 ENOXAPARIN PER 10 MG: Performed by: NURSE PRACTITIONER

## 2022-08-29 PROCEDURE — 97110 THERAPEUTIC EXERCISES: CPT

## 2022-08-29 PROCEDURE — 82962 GLUCOSE BLOOD TEST: CPT

## 2022-08-29 PROCEDURE — 93010 ELECTROCARDIOGRAM REPORT: CPT | Performed by: INTERNAL MEDICINE

## 2022-08-29 PROCEDURE — 97530 THERAPEUTIC ACTIVITIES: CPT

## 2022-08-29 PROCEDURE — 25010000002 ONDANSETRON PER 1 MG: Performed by: NURSE PRACTITIONER

## 2022-08-29 PROCEDURE — 80048 BASIC METABOLIC PNL TOTAL CA: CPT | Performed by: NURSE PRACTITIONER

## 2022-08-29 PROCEDURE — 94761 N-INVAS EAR/PLS OXIMETRY MLT: CPT

## 2022-08-29 RX ORDER — NITROGLYCERIN 0.4 MG/1
0.4 TABLET SUBLINGUAL
Status: DISCONTINUED | OUTPATIENT
Start: 2022-08-29 | End: 2022-09-09 | Stop reason: HOSPADM

## 2022-08-29 RX ADMIN — GUAIFENESIN 1200 MG: 600 TABLET, EXTENDED RELEASE ORAL at 20:45

## 2022-08-29 RX ADMIN — ATORVASTATIN CALCIUM 40 MG: 20 TABLET, FILM COATED ORAL at 20:45

## 2022-08-29 RX ADMIN — POLYETHYLENE GLYCOL 3350 17 G: 17 POWDER, FOR SOLUTION ORAL at 08:25

## 2022-08-29 RX ADMIN — ACETAMINOPHEN 1000 MG: 10 INJECTION, SOLUTION INTRAVENOUS at 14:30

## 2022-08-29 RX ADMIN — ALPRAZOLAM 0.25 MG: 0.25 TABLET ORAL at 05:53

## 2022-08-29 RX ADMIN — ASPIRIN 81 MG: 81 TABLET, COATED ORAL at 08:25

## 2022-08-29 RX ADMIN — ACETAMINOPHEN 1000 MG: 10 INJECTION, SOLUTION INTRAVENOUS at 05:32

## 2022-08-29 RX ADMIN — PANTOPRAZOLE SODIUM 40 MG: 40 TABLET, DELAYED RELEASE ORAL at 05:52

## 2022-08-29 RX ADMIN — CHLORHEXIDINE GLUCONATE 15 ML: 1.2 SOLUTION ORAL at 20:45

## 2022-08-29 RX ADMIN — CHLORHEXIDINE GLUCONATE 15 ML: 1.2 SOLUTION ORAL at 08:25

## 2022-08-29 RX ADMIN — METOPROLOL TARTRATE 12.5 MG: 25 TABLET, FILM COATED ORAL at 08:30

## 2022-08-29 RX ADMIN — MUPIROCIN 1 APPLICATION: 20 OINTMENT TOPICAL at 20:45

## 2022-08-29 RX ADMIN — ENOXAPARIN SODIUM 40 MG: 100 INJECTION SUBCUTANEOUS at 17:32

## 2022-08-29 RX ADMIN — ONDANSETRON 4 MG: 2 INJECTION INTRAMUSCULAR; INTRAVENOUS at 11:27

## 2022-08-29 RX ADMIN — AMIODARONE HYDROCHLORIDE 0.5 MG/MIN: 1.8 INJECTION, SOLUTION INTRAVENOUS at 20:43

## 2022-08-29 RX ADMIN — METOPROLOL TARTRATE 25 MG: 25 TABLET, FILM COATED ORAL at 20:45

## 2022-08-29 RX ADMIN — MUPIROCIN 1 APPLICATION: 20 OINTMENT TOPICAL at 08:25

## 2022-08-29 RX ADMIN — AMIODARONE HYDROCHLORIDE 0.5 MG/MIN: 1.8 INJECTION, SOLUTION INTRAVENOUS at 08:08

## 2022-08-29 RX ADMIN — ACETAMINOPHEN 1000 MG: 10 INJECTION, SOLUTION INTRAVENOUS at 22:12

## 2022-08-29 RX ADMIN — GUAIFENESIN 1200 MG: 600 TABLET, EXTENDED RELEASE ORAL at 08:25

## 2022-08-29 RX ADMIN — CYCLOBENZAPRINE 10 MG: 10 TABLET, FILM COATED ORAL at 15:12

## 2022-08-30 ENCOUNTER — APPOINTMENT (OUTPATIENT)
Dept: GENERAL RADIOLOGY | Facility: HOSPITAL | Age: 77
End: 2022-08-30

## 2022-08-30 LAB
ANION GAP SERPL CALCULATED.3IONS-SCNC: 9.9 MMOL/L (ref 5–15)
BUN SERPL-MCNC: 60 MG/DL (ref 8–23)
BUN/CREAT SERPL: 30.3 (ref 7–25)
CALCIUM SPEC-SCNC: 8.8 MG/DL (ref 8.6–10.5)
CHLORIDE SERPL-SCNC: 95 MMOL/L (ref 98–107)
CO2 SERPL-SCNC: 24.1 MMOL/L (ref 22–29)
CREAT SERPL-MCNC: 1.98 MG/DL (ref 0.76–1.27)
DEPRECATED RDW RBC AUTO: 43.9 FL (ref 37–54)
EGFRCR SERPLBLD CKD-EPI 2021: 33.7 ML/MIN/1.73
ERYTHROCYTE [DISTWIDTH] IN BLOOD BY AUTOMATED COUNT: 12.8 % (ref 12.3–15.4)
GLUCOSE BLDC GLUCOMTR-MCNC: 129 MG/DL (ref 70–130)
GLUCOSE BLDC GLUCOMTR-MCNC: 133 MG/DL (ref 70–130)
GLUCOSE BLDC GLUCOMTR-MCNC: 142 MG/DL (ref 70–130)
GLUCOSE BLDC GLUCOMTR-MCNC: 144 MG/DL (ref 70–130)
GLUCOSE SERPL-MCNC: 132 MG/DL (ref 65–99)
HCT VFR BLD AUTO: 37.2 % (ref 37.5–51)
HGB BLD-MCNC: 12.7 G/DL (ref 13–17.7)
MCH RBC QN AUTO: 32.2 PG (ref 26.6–33)
MCHC RBC AUTO-ENTMCNC: 34.1 G/DL (ref 31.5–35.7)
MCV RBC AUTO: 94.2 FL (ref 79–97)
PLATELET # BLD AUTO: 269 10*3/MM3 (ref 140–450)
PMV BLD AUTO: 10.6 FL (ref 6–12)
POTASSIUM SERPL-SCNC: 3.6 MMOL/L (ref 3.5–5.2)
QT INTERVAL: 312 MS
QT INTERVAL: 363 MS
QT INTERVAL: 381 MS
QT INTERVAL: 391 MS
RBC # BLD AUTO: 3.95 10*6/MM3 (ref 4.14–5.8)
SODIUM SERPL-SCNC: 129 MMOL/L (ref 136–145)
WBC NRBC COR # BLD: 17.37 10*3/MM3 (ref 3.4–10.8)

## 2022-08-30 PROCEDURE — 94761 N-INVAS EAR/PLS OXIMETRY MLT: CPT

## 2022-08-30 PROCEDURE — 99232 SBSQ HOSP IP/OBS MODERATE 35: CPT | Performed by: SURGERY

## 2022-08-30 PROCEDURE — 94799 UNLISTED PULMONARY SVC/PX: CPT

## 2022-08-30 PROCEDURE — 74018 RADEX ABDOMEN 1 VIEW: CPT

## 2022-08-30 PROCEDURE — 93010 ELECTROCARDIOGRAM REPORT: CPT | Performed by: INTERNAL MEDICINE

## 2022-08-30 PROCEDURE — 93005 ELECTROCARDIOGRAM TRACING: CPT | Performed by: NURSE PRACTITIONER

## 2022-08-30 PROCEDURE — 82962 GLUCOSE BLOOD TEST: CPT

## 2022-08-30 PROCEDURE — 97530 THERAPEUTIC ACTIVITIES: CPT

## 2022-08-30 PROCEDURE — 97110 THERAPEUTIC EXERCISES: CPT

## 2022-08-30 PROCEDURE — 25010000002 AMIODARONE IN DEXTROSE 5% 360-4.14 MG/200ML-% SOLUTION: Performed by: NURSE PRACTITIONER

## 2022-08-30 PROCEDURE — 85027 COMPLETE CBC AUTOMATED: CPT | Performed by: NURSE PRACTITIONER

## 2022-08-30 PROCEDURE — 25010000002 ENOXAPARIN PER 10 MG: Performed by: NURSE PRACTITIONER

## 2022-08-30 PROCEDURE — 80048 BASIC METABOLIC PNL TOTAL CA: CPT | Performed by: NURSE PRACTITIONER

## 2022-08-30 RX ORDER — DEXTROSE AND SODIUM CHLORIDE 5; .9 G/100ML; G/100ML
125 INJECTION, SOLUTION INTRAVENOUS CONTINUOUS
Status: DISCONTINUED | OUTPATIENT
Start: 2022-08-30 | End: 2022-09-01

## 2022-08-30 RX ORDER — DEXTROSE AND SODIUM CHLORIDE 5; .9 G/100ML; G/100ML
80 INJECTION, SOLUTION INTRAVENOUS CONTINUOUS
Status: DISCONTINUED | OUTPATIENT
Start: 2022-08-30 | End: 2022-08-30

## 2022-08-30 RX ORDER — DOCUSATE SODIUM 100 MG/1
100 CAPSULE, LIQUID FILLED ORAL 2 TIMES DAILY
Status: DISCONTINUED | OUTPATIENT
Start: 2022-08-30 | End: 2022-08-30

## 2022-08-30 RX ORDER — BISACODYL 10 MG
10 SUPPOSITORY, RECTAL RECTAL 2 TIMES DAILY
Status: DISCONTINUED | OUTPATIENT
Start: 2022-08-30 | End: 2022-08-31

## 2022-08-30 RX ORDER — BISACODYL 10 MG
10 SUPPOSITORY, RECTAL RECTAL ONCE
Status: COMPLETED | OUTPATIENT
Start: 2022-08-30 | End: 2022-08-30

## 2022-08-30 RX ORDER — AMIODARONE HYDROCHLORIDE 200 MG/1
300 TABLET ORAL EVERY 12 HOURS SCHEDULED
Status: COMPLETED | OUTPATIENT
Start: 2022-08-30 | End: 2022-09-03

## 2022-08-30 RX ORDER — SIMETHICONE 80 MG
80 TABLET,CHEWABLE ORAL 4 TIMES DAILY
Status: DISCONTINUED | OUTPATIENT
Start: 2022-08-30 | End: 2022-09-09 | Stop reason: HOSPADM

## 2022-08-30 RX ADMIN — ACETAMINOPHEN 1000 MG: 10 INJECTION, SOLUTION INTRAVENOUS at 21:08

## 2022-08-30 RX ADMIN — ACETAMINOPHEN 1000 MG: 10 INJECTION, SOLUTION INTRAVENOUS at 13:45

## 2022-08-30 RX ADMIN — AMIODARONE HYDROCHLORIDE 0.3 MG: 200 TABLET ORAL at 10:52

## 2022-08-30 RX ADMIN — DEXTROSE AND SODIUM CHLORIDE 80 ML: 5; 900 INJECTION, SOLUTION INTRAVENOUS at 13:46

## 2022-08-30 RX ADMIN — CHLORHEXIDINE GLUCONATE 15 ML: 1.2 SOLUTION ORAL at 08:53

## 2022-08-30 RX ADMIN — SIMETHICONE 80 MG: 80 TABLET, CHEWABLE ORAL at 21:11

## 2022-08-30 RX ADMIN — POTASSIUM CHLORIDE 40 MEQ: 750 TABLET, EXTENDED RELEASE ORAL at 07:18

## 2022-08-30 RX ADMIN — CHLORHEXIDINE GLUCONATE 15 ML: 1.2 SOLUTION ORAL at 21:09

## 2022-08-30 RX ADMIN — ACETAMINOPHEN 1000 MG: 10 INJECTION, SOLUTION INTRAVENOUS at 07:18

## 2022-08-30 RX ADMIN — BISACODYL 10 MG: 10 SUPPOSITORY RECTAL at 21:11

## 2022-08-30 RX ADMIN — AMIODARONE HYDROCHLORIDE 300 MG: 200 TABLET ORAL at 21:10

## 2022-08-30 RX ADMIN — SIMETHICONE 80 MG: 80 TABLET, CHEWABLE ORAL at 17:30

## 2022-08-30 RX ADMIN — MUPIROCIN 1 APPLICATION: 20 OINTMENT TOPICAL at 21:10

## 2022-08-30 RX ADMIN — PANTOPRAZOLE SODIUM 40 MG: 40 TABLET, DELAYED RELEASE ORAL at 07:18

## 2022-08-30 RX ADMIN — GUAIFENESIN 1200 MG: 600 TABLET, EXTENDED RELEASE ORAL at 08:53

## 2022-08-30 RX ADMIN — ASPIRIN 81 MG: 81 TABLET, COATED ORAL at 08:53

## 2022-08-30 RX ADMIN — METOPROLOL TARTRATE 25 MG: 25 TABLET, FILM COATED ORAL at 21:11

## 2022-08-30 RX ADMIN — MUPIROCIN 1 APPLICATION: 20 OINTMENT TOPICAL at 08:53

## 2022-08-30 RX ADMIN — ALPRAZOLAM 0.25 MG: 0.25 TABLET ORAL at 10:47

## 2022-08-30 RX ADMIN — DOCUSATE SODIUM 100 MG: 100 CAPSULE, LIQUID FILLED ORAL at 13:46

## 2022-08-30 RX ADMIN — BISACODYL 10 MG: 10 SUPPOSITORY RECTAL at 13:46

## 2022-08-30 RX ADMIN — AMIODARONE HYDROCHLORIDE 0.5 MG/MIN: 1.8 INJECTION, SOLUTION INTRAVENOUS at 08:53

## 2022-08-30 RX ADMIN — GUAIFENESIN 1200 MG: 600 TABLET, EXTENDED RELEASE ORAL at 21:10

## 2022-08-30 RX ADMIN — ENOXAPARIN SODIUM 40 MG: 100 INJECTION SUBCUTANEOUS at 17:30

## 2022-08-30 RX ADMIN — METOPROLOL TARTRATE 25 MG: 25 TABLET, FILM COATED ORAL at 08:53

## 2022-08-30 RX ADMIN — ATORVASTATIN CALCIUM 40 MG: 20 TABLET, FILM COATED ORAL at 21:10

## 2022-08-31 ENCOUNTER — APPOINTMENT (OUTPATIENT)
Dept: GENERAL RADIOLOGY | Facility: HOSPITAL | Age: 77
End: 2022-08-31

## 2022-08-31 LAB
ALBUMIN SERPL-MCNC: 3.3 G/DL (ref 3.5–5.2)
ANION GAP SERPL CALCULATED.3IONS-SCNC: 10.3 MMOL/L (ref 5–15)
BUN SERPL-MCNC: 73 MG/DL (ref 8–23)
BUN/CREAT SERPL: 40.1 (ref 7–25)
CALCIUM SPEC-SCNC: 8.6 MG/DL (ref 8.6–10.5)
CHLORIDE SERPL-SCNC: 100 MMOL/L (ref 98–107)
CO2 SERPL-SCNC: 20.7 MMOL/L (ref 22–29)
CREAT SERPL-MCNC: 1.82 MG/DL (ref 0.76–1.27)
CREAT UR-MCNC: 158.6 MG/DL
DEPRECATED RDW RBC AUTO: 42.5 FL (ref 37–54)
EGFRCR SERPLBLD CKD-EPI 2021: 37.3 ML/MIN/1.73
ERYTHROCYTE [DISTWIDTH] IN BLOOD BY AUTOMATED COUNT: 13 % (ref 12.3–15.4)
GLUCOSE BLDC GLUCOMTR-MCNC: 125 MG/DL (ref 70–130)
GLUCOSE BLDC GLUCOMTR-MCNC: 126 MG/DL (ref 70–130)
GLUCOSE BLDC GLUCOMTR-MCNC: 157 MG/DL (ref 70–130)
GLUCOSE BLDC GLUCOMTR-MCNC: 171 MG/DL (ref 70–130)
GLUCOSE SERPL-MCNC: 141 MG/DL (ref 65–99)
HCT VFR BLD AUTO: 35.8 % (ref 37.5–51)
HGB BLD-MCNC: 12.7 G/DL (ref 13–17.7)
MAGNESIUM SERPL-MCNC: 2.8 MG/DL (ref 1.6–2.4)
MCH RBC QN AUTO: 32.7 PG (ref 26.6–33)
MCHC RBC AUTO-ENTMCNC: 35.5 G/DL (ref 31.5–35.7)
MCV RBC AUTO: 92.3 FL (ref 79–97)
OSMOLALITY UR: 545 MOSM/KG
PHOSPHATE SERPL-MCNC: 4 MG/DL (ref 2.5–4.5)
PLATELET # BLD AUTO: 308 10*3/MM3 (ref 140–450)
PMV BLD AUTO: 10.2 FL (ref 6–12)
POTASSIUM SERPL-SCNC: 3.5 MMOL/L (ref 3.5–5.2)
QT INTERVAL: 325 MS
RBC # BLD AUTO: 3.88 10*6/MM3 (ref 4.14–5.8)
SODIUM SERPL-SCNC: 131 MMOL/L (ref 136–145)
SODIUM UR-SCNC: <20 MMOL/L
URATE SERPL-MCNC: 7.9 MG/DL (ref 3.4–7)
WBC NRBC COR # BLD: 19.18 10*3/MM3 (ref 3.4–10.8)

## 2022-08-31 PROCEDURE — 80069 RENAL FUNCTION PANEL: CPT | Performed by: INTERNAL MEDICINE

## 2022-08-31 PROCEDURE — 94640 AIRWAY INHALATION TREATMENT: CPT

## 2022-08-31 PROCEDURE — 84300 ASSAY OF URINE SODIUM: CPT | Performed by: INTERNAL MEDICINE

## 2022-08-31 PROCEDURE — 93010 ELECTROCARDIOGRAM REPORT: CPT | Performed by: INTERNAL MEDICINE

## 2022-08-31 PROCEDURE — 94760 N-INVAS EAR/PLS OXIMETRY 1: CPT

## 2022-08-31 PROCEDURE — 99231 SBSQ HOSP IP/OBS SF/LOW 25: CPT | Performed by: SURGERY

## 2022-08-31 PROCEDURE — 93005 ELECTROCARDIOGRAM TRACING: CPT | Performed by: NURSE PRACTITIONER

## 2022-08-31 PROCEDURE — 25010000002 ENOXAPARIN PER 10 MG: Performed by: NURSE PRACTITIONER

## 2022-08-31 PROCEDURE — 94799 UNLISTED PULMONARY SVC/PX: CPT

## 2022-08-31 PROCEDURE — 84550 ASSAY OF BLOOD/URIC ACID: CPT | Performed by: INTERNAL MEDICINE

## 2022-08-31 PROCEDURE — 97110 THERAPEUTIC EXERCISES: CPT

## 2022-08-31 PROCEDURE — 97530 THERAPEUTIC ACTIVITIES: CPT

## 2022-08-31 PROCEDURE — 82570 ASSAY OF URINE CREATININE: CPT | Performed by: INTERNAL MEDICINE

## 2022-08-31 PROCEDURE — 94664 DEMO&/EVAL PT USE INHALER: CPT

## 2022-08-31 PROCEDURE — 94761 N-INVAS EAR/PLS OXIMETRY MLT: CPT

## 2022-08-31 PROCEDURE — 83735 ASSAY OF MAGNESIUM: CPT | Performed by: INTERNAL MEDICINE

## 2022-08-31 PROCEDURE — 74018 RADEX ABDOMEN 1 VIEW: CPT

## 2022-08-31 PROCEDURE — 83935 ASSAY OF URINE OSMOLALITY: CPT | Performed by: INTERNAL MEDICINE

## 2022-08-31 PROCEDURE — 85027 COMPLETE CBC AUTOMATED: CPT | Performed by: INTERNAL MEDICINE

## 2022-08-31 PROCEDURE — 82962 GLUCOSE BLOOD TEST: CPT

## 2022-08-31 RX ORDER — FLUTICASONE PROPIONATE 50 MCG
2 SPRAY, SUSPENSION (ML) NASAL AS NEEDED
Status: DISCONTINUED | OUTPATIENT
Start: 2022-08-31 | End: 2022-09-09 | Stop reason: HOSPADM

## 2022-08-31 RX ORDER — METOPROLOL TARTRATE 50 MG/1
50 TABLET, FILM COATED ORAL EVERY 12 HOURS SCHEDULED
Status: DISCONTINUED | OUTPATIENT
Start: 2022-08-31 | End: 2022-08-31

## 2022-08-31 RX ORDER — LACTULOSE 10 G/15ML
300 SOLUTION ORAL ONCE
Status: COMPLETED | OUTPATIENT
Start: 2022-08-31 | End: 2022-09-01

## 2022-08-31 RX ORDER — SODIUM CHLORIDE FOR INHALATION 7 %
4 VIAL, NEBULIZER (ML) INHALATION
Status: COMPLETED | OUTPATIENT
Start: 2022-08-31 | End: 2022-09-01

## 2022-08-31 RX ORDER — LIDOCAINE HYDROCHLORIDE 20 MG/ML
JELLY TOPICAL ONCE
Status: DISCONTINUED | OUTPATIENT
Start: 2022-08-31 | End: 2022-09-07

## 2022-08-31 RX ORDER — IPRATROPIUM BROMIDE AND ALBUTEROL SULFATE 2.5; .5 MG/3ML; MG/3ML
3 SOLUTION RESPIRATORY (INHALATION) EVERY 4 HOURS PRN
Status: DISCONTINUED | OUTPATIENT
Start: 2022-08-31 | End: 2022-09-09 | Stop reason: HOSPADM

## 2022-08-31 RX ORDER — ATENOLOL 25 MG/1
25 TABLET ORAL EVERY 12 HOURS SCHEDULED
Status: DISCONTINUED | OUTPATIENT
Start: 2022-08-31 | End: 2022-09-08

## 2022-08-31 RX ORDER — MONTELUKAST SODIUM 10 MG/1
10 TABLET ORAL NIGHTLY
Status: DISCONTINUED | OUTPATIENT
Start: 2022-08-31 | End: 2022-09-01

## 2022-08-31 RX ADMIN — SIMETHICONE 80 MG: 80 TABLET, CHEWABLE ORAL at 18:57

## 2022-08-31 RX ADMIN — MONTELUKAST SODIUM 10 MG: 10 TABLET, FILM COATED ORAL at 21:54

## 2022-08-31 RX ADMIN — METOPROLOL TARTRATE 25 MG: 25 TABLET, FILM COATED ORAL at 08:13

## 2022-08-31 RX ADMIN — GUAIFENESIN 1200 MG: 600 TABLET, EXTENDED RELEASE ORAL at 08:13

## 2022-08-31 RX ADMIN — ACETAMINOPHEN 1000 MG: 10 INJECTION, SOLUTION INTRAVENOUS at 21:49

## 2022-08-31 RX ADMIN — MUPIROCIN 1 APPLICATION: 20 OINTMENT TOPICAL at 21:54

## 2022-08-31 RX ADMIN — IPRATROPIUM BROMIDE AND ALBUTEROL SULFATE 3 ML: .5; 3 SOLUTION RESPIRATORY (INHALATION) at 21:06

## 2022-08-31 RX ADMIN — SIMETHICONE 80 MG: 80 TABLET, CHEWABLE ORAL at 21:54

## 2022-08-31 RX ADMIN — ASPIRIN 81 MG: 81 TABLET, COATED ORAL at 08:13

## 2022-08-31 RX ADMIN — SIMETHICONE 80 MG: 80 TABLET, CHEWABLE ORAL at 12:18

## 2022-08-31 RX ADMIN — ACETAMINOPHEN 1000 MG: 10 INJECTION, SOLUTION INTRAVENOUS at 15:02

## 2022-08-31 RX ADMIN — ENOXAPARIN SODIUM 40 MG: 100 INJECTION SUBCUTANEOUS at 18:57

## 2022-08-31 RX ADMIN — CHLORHEXIDINE GLUCONATE 15 ML: 1.2 SOLUTION ORAL at 21:54

## 2022-08-31 RX ADMIN — AMIODARONE HYDROCHLORIDE 300 MG: 200 TABLET ORAL at 21:54

## 2022-08-31 RX ADMIN — SIMETHICONE 80 MG: 80 TABLET, CHEWABLE ORAL at 08:14

## 2022-08-31 RX ADMIN — ATORVASTATIN CALCIUM 40 MG: 20 TABLET, FILM COATED ORAL at 21:54

## 2022-08-31 RX ADMIN — ACETAMINOPHEN 1000 MG: 10 INJECTION, SOLUTION INTRAVENOUS at 08:12

## 2022-08-31 RX ADMIN — AMIODARONE HYDROCHLORIDE 300 MG: 200 TABLET ORAL at 08:13

## 2022-08-31 RX ADMIN — PANTOPRAZOLE SODIUM 40 MG: 40 TABLET, DELAYED RELEASE ORAL at 08:13

## 2022-08-31 RX ADMIN — CHLORHEXIDINE GLUCONATE 15 ML: 1.2 SOLUTION ORAL at 08:13

## 2022-08-31 RX ADMIN — SODIUM CHLORIDE SOLN NEBU 7% 4 ML: 7 NEBU SOLN at 21:10

## 2022-08-31 RX ADMIN — GUAIFENESIN 1200 MG: 600 TABLET, EXTENDED RELEASE ORAL at 21:54

## 2022-08-31 RX ADMIN — IPRATROPIUM BROMIDE AND ALBUTEROL SULFATE 3 ML: .5; 3 SOLUTION RESPIRATORY (INHALATION) at 11:20

## 2022-08-31 RX ADMIN — MUPIROCIN 1 APPLICATION: 20 OINTMENT TOPICAL at 08:13

## 2022-08-31 RX ADMIN — BISACODYL 10 MG: 10 SUPPOSITORY RECTAL at 08:13

## 2022-08-31 RX ADMIN — ATENOLOL 25 MG: 25 TABLET ORAL at 14:59

## 2022-08-31 RX ADMIN — SODIUM CHLORIDE SOLN NEBU 7% 4 ML: 7 NEBU SOLN at 11:21

## 2022-08-31 RX ADMIN — DEXTROSE AND SODIUM CHLORIDE 125 ML/HR: 5; 900 INJECTION, SOLUTION INTRAVENOUS at 21:46

## 2022-09-01 ENCOUNTER — APPOINTMENT (OUTPATIENT)
Dept: GENERAL RADIOLOGY | Facility: HOSPITAL | Age: 77
End: 2022-09-01

## 2022-09-01 LAB
ALBUMIN SERPL-MCNC: 3.3 G/DL (ref 3.5–5.2)
ALBUMIN/GLOB SERPL: 1.2 G/DL
ALP SERPL-CCNC: 80 U/L (ref 39–117)
ALT SERPL W P-5'-P-CCNC: 9 U/L (ref 1–41)
ANION GAP SERPL CALCULATED.3IONS-SCNC: 13.1 MMOL/L (ref 5–15)
AST SERPL-CCNC: 16 U/L (ref 1–40)
BACTERIA UR QL AUTO: ABNORMAL /HPF
BILIRUB SERPL-MCNC: 0.6 MG/DL (ref 0–1.2)
BILIRUB UR QL STRIP: NEGATIVE
BUN SERPL-MCNC: 89 MG/DL (ref 8–23)
BUN/CREAT SERPL: 41 (ref 7–25)
CALCIUM SPEC-SCNC: 9 MG/DL (ref 8.6–10.5)
CHLORIDE SERPL-SCNC: 102 MMOL/L (ref 98–107)
CLARITY UR: CLEAR
CO2 SERPL-SCNC: 17.9 MMOL/L (ref 22–29)
COLOR UR: YELLOW
CREAT SERPL-MCNC: 2.17 MG/DL (ref 0.76–1.27)
DEPRECATED RDW RBC AUTO: 44.1 FL (ref 37–54)
EGFRCR SERPLBLD CKD-EPI 2021: 30.2 ML/MIN/1.73
ERYTHROCYTE [DISTWIDTH] IN BLOOD BY AUTOMATED COUNT: 13.1 % (ref 12.3–15.4)
GLOBULIN UR ELPH-MCNC: 2.8 GM/DL
GLUCOSE BLDC GLUCOMTR-MCNC: 109 MG/DL (ref 70–130)
GLUCOSE BLDC GLUCOMTR-MCNC: 150 MG/DL (ref 70–130)
GLUCOSE SERPL-MCNC: 145 MG/DL (ref 65–99)
GLUCOSE UR STRIP-MCNC: NEGATIVE MG/DL
HCT VFR BLD AUTO: 35 % (ref 37.5–51)
HGB BLD-MCNC: 11.8 G/DL (ref 13–17.7)
HGB UR QL STRIP.AUTO: ABNORMAL
HYALINE CASTS UR QL AUTO: ABNORMAL /LPF
KETONES UR QL STRIP: NEGATIVE
LEUKOCYTE ESTERASE UR QL STRIP.AUTO: NEGATIVE
MAGNESIUM SERPL-MCNC: 3 MG/DL (ref 1.6–2.4)
MCH RBC QN AUTO: 31.3 PG (ref 26.6–33)
MCHC RBC AUTO-ENTMCNC: 33.7 G/DL (ref 31.5–35.7)
MCV RBC AUTO: 92.8 FL (ref 79–97)
NITRITE UR QL STRIP: NEGATIVE
PH UR STRIP.AUTO: 6 [PH] (ref 5–8)
PHOSPHATE SERPL-MCNC: 4.4 MG/DL (ref 2.5–4.5)
PLATELET # BLD AUTO: 327 10*3/MM3 (ref 140–450)
PMV BLD AUTO: 10.2 FL (ref 6–12)
POTASSIUM SERPL-SCNC: 3.4 MMOL/L (ref 3.5–5.2)
PROT SERPL-MCNC: 6.1 G/DL (ref 6–8.5)
PROT UR QL STRIP: ABNORMAL
RBC # BLD AUTO: 3.77 10*6/MM3 (ref 4.14–5.8)
RBC # UR STRIP: ABNORMAL /HPF
REF LAB TEST METHOD: ABNORMAL
SODIUM SERPL-SCNC: 133 MMOL/L (ref 136–145)
SP GR UR STRIP: 1.02 (ref 1–1.03)
SQUAMOUS #/AREA URNS HPF: ABNORMAL /HPF
UROBILINOGEN UR QL STRIP: ABNORMAL
WBC # UR STRIP: ABNORMAL /HPF
WBC NRBC COR # BLD: 19.05 10*3/MM3 (ref 3.4–10.8)

## 2022-09-01 PROCEDURE — 25010000002 NEOSTIGMINE 5 MG/10ML SOLUTION: Performed by: SURGERY

## 2022-09-01 PROCEDURE — 80053 COMPREHEN METABOLIC PANEL: CPT | Performed by: INTERNAL MEDICINE

## 2022-09-01 PROCEDURE — 74018 RADEX ABDOMEN 1 VIEW: CPT

## 2022-09-01 PROCEDURE — 94799 UNLISTED PULMONARY SVC/PX: CPT

## 2022-09-01 PROCEDURE — 25010000002 ENOXAPARIN PER 10 MG: Performed by: NURSE PRACTITIONER

## 2022-09-01 PROCEDURE — 82962 GLUCOSE BLOOD TEST: CPT

## 2022-09-01 PROCEDURE — 94664 DEMO&/EVAL PT USE INHALER: CPT

## 2022-09-01 PROCEDURE — 71045 X-RAY EXAM CHEST 1 VIEW: CPT

## 2022-09-01 PROCEDURE — 0 POTASSIUM CHLORIDE 10 MEQ/100ML SOLUTION: Performed by: INTERNAL MEDICINE

## 2022-09-01 PROCEDURE — 83735 ASSAY OF MAGNESIUM: CPT | Performed by: INTERNAL MEDICINE

## 2022-09-01 PROCEDURE — 94760 N-INVAS EAR/PLS OXIMETRY 1: CPT

## 2022-09-01 PROCEDURE — 81001 URINALYSIS AUTO W/SCOPE: CPT | Performed by: NURSE PRACTITIONER

## 2022-09-01 PROCEDURE — 94761 N-INVAS EAR/PLS OXIMETRY MLT: CPT

## 2022-09-01 PROCEDURE — 87086 URINE CULTURE/COLONY COUNT: CPT | Performed by: NURSE PRACTITIONER

## 2022-09-01 PROCEDURE — 85027 COMPLETE CBC AUTOMATED: CPT | Performed by: INTERNAL MEDICINE

## 2022-09-01 PROCEDURE — 84100 ASSAY OF PHOSPHORUS: CPT | Performed by: INTERNAL MEDICINE

## 2022-09-01 RX ORDER — TAMSULOSIN HYDROCHLORIDE 0.4 MG/1
0.4 CAPSULE ORAL DAILY
Status: DISCONTINUED | OUTPATIENT
Start: 2022-09-01 | End: 2022-09-01

## 2022-09-01 RX ORDER — NEOSTIGMINE METHYLSULFATE 0.5 MG/ML
2 INJECTION, SOLUTION INTRAVENOUS ONCE
Status: COMPLETED | OUTPATIENT
Start: 2022-09-01 | End: 2022-09-01

## 2022-09-01 RX ORDER — CALCIUM GLUCONATE 20 MG/ML
2 INJECTION, SOLUTION INTRAVENOUS ONCE
Status: DISCONTINUED | OUTPATIENT
Start: 2022-09-01 | End: 2022-09-01

## 2022-09-01 RX ORDER — POTASSIUM CHLORIDE 7.45 MG/ML
10 INJECTION INTRAVENOUS ONCE
Status: COMPLETED | OUTPATIENT
Start: 2022-09-01 | End: 2022-09-01

## 2022-09-01 RX ORDER — POTASSIUM CHLORIDE 7.45 MG/ML
10 INJECTION INTRAVENOUS
Status: DISPENSED | OUTPATIENT
Start: 2022-09-01 | End: 2022-09-01

## 2022-09-01 RX ORDER — SODIUM CHLORIDE FOR INHALATION 7 %
4 VIAL, NEBULIZER (ML) INHALATION
Status: DISPENSED | OUTPATIENT
Start: 2022-09-01 | End: 2022-09-03

## 2022-09-01 RX ADMIN — SIMETHICONE 80 MG: 80 TABLET, CHEWABLE ORAL at 14:49

## 2022-09-01 RX ADMIN — POTASSIUM CHLORIDE 10 MEQ: 7.46 INJECTION, SOLUTION INTRAVENOUS at 18:18

## 2022-09-01 RX ADMIN — POTASSIUM CHLORIDE 10 MEQ: 7.46 INJECTION, SOLUTION INTRAVENOUS at 14:49

## 2022-09-01 RX ADMIN — SODIUM BICARBONATE: 84 INJECTION, SOLUTION INTRAVENOUS at 14:48

## 2022-09-01 RX ADMIN — SIMETHICONE 80 MG: 80 TABLET, CHEWABLE ORAL at 21:15

## 2022-09-01 RX ADMIN — ASPIRIN 81 MG: 81 TABLET, COATED ORAL at 09:20

## 2022-09-01 RX ADMIN — ATENOLOL 25 MG: 25 TABLET ORAL at 21:15

## 2022-09-01 RX ADMIN — ACETAMINOPHEN 1000 MG: 10 INJECTION, SOLUTION INTRAVENOUS at 23:02

## 2022-09-01 RX ADMIN — MUPIROCIN: 20 OINTMENT TOPICAL at 21:02

## 2022-09-01 RX ADMIN — MUPIROCIN 1 APPLICATION: 20 OINTMENT TOPICAL at 09:22

## 2022-09-01 RX ADMIN — ACETAMINOPHEN 1000 MG: 10 INJECTION, SOLUTION INTRAVENOUS at 06:31

## 2022-09-01 RX ADMIN — AMIODARONE HYDROCHLORIDE 300 MG: 200 TABLET ORAL at 21:14

## 2022-09-01 RX ADMIN — IPRATROPIUM BROMIDE AND ALBUTEROL SULFATE 3 ML: .5; 3 SOLUTION RESPIRATORY (INHALATION) at 07:29

## 2022-09-01 RX ADMIN — POTASSIUM CHLORIDE 10 MEQ: 7.46 INJECTION, SOLUTION INTRAVENOUS at 16:50

## 2022-09-01 RX ADMIN — SODIUM CHLORIDE SOLN NEBU 7% 4 ML: 7 NEBU SOLN at 07:29

## 2022-09-01 RX ADMIN — AMIODARONE HYDROCHLORIDE 300 MG: 200 TABLET ORAL at 09:20

## 2022-09-01 RX ADMIN — ATENOLOL 25 MG: 25 TABLET ORAL at 09:22

## 2022-09-01 RX ADMIN — DEXTROSE AND SODIUM CHLORIDE 125 ML/HR: 5; 900 INJECTION, SOLUTION INTRAVENOUS at 09:22

## 2022-09-01 RX ADMIN — SODIUM CHLORIDE SOLN NEBU 7% 4 ML: 7 NEBU SOLN at 20:34

## 2022-09-01 RX ADMIN — IPRATROPIUM BROMIDE AND ALBUTEROL SULFATE 3 ML: .5; 3 SOLUTION RESPIRATORY (INHALATION) at 20:34

## 2022-09-01 RX ADMIN — GUAIFENESIN 1200 MG: 600 TABLET, EXTENDED RELEASE ORAL at 09:20

## 2022-09-01 RX ADMIN — CHLORHEXIDINE GLUCONATE 15 ML: 1.2 SOLUTION ORAL at 09:22

## 2022-09-01 RX ADMIN — POTASSIUM CHLORIDE 10 MEQ: 7.46 INJECTION, SOLUTION INTRAVENOUS at 21:15

## 2022-09-01 RX ADMIN — ENOXAPARIN SODIUM 40 MG: 100 INJECTION SUBCUTANEOUS at 18:19

## 2022-09-01 RX ADMIN — NEOSTIGMINE METHYLSULFATE 2 MG: 0.5 INJECTION INTRAVENOUS at 12:35

## 2022-09-01 RX ADMIN — PANTOPRAZOLE SODIUM 40 MG: 40 TABLET, DELAYED RELEASE ORAL at 06:36

## 2022-09-01 RX ADMIN — LACTULOSE 300 ML: 10 SOLUTION ORAL at 10:14

## 2022-09-01 NOTE — PROGRESS NOTES
[facility] Progress Note      Patient Name: Chaitanya Montesinos  : 1945  MRN: [id number]  Primary Care Physician:  Keely Lo APRN  Date of admission: 2022    Subjective     Interval History:   Follow up VIRA.  Bowels moving, but KUB with increased cecal diameter. Dr. Bundy has ordered neostigmine.  Some urinary retention last night, but able to void and bladder scans have been 200's.    Urine not all recorded.  Having abdominal pain .  Review of Systems:   As noted above    Objective     Vitals:   Temp:  [97.4 °F (36.3 °C)-97.8 °F (36.6 °C)] 97.5 °F (36.4 °C)  Heart Rate:  [] 84  Resp:  [19-20] 20  BP: (110-134)/(59-83) 121/59  Flow (L/min):  [2-3] 3    Intake/Output Summary (Last 24 hours) at 2022 1132  Last data filed at 2022 0631  Gross per 24 hour   Intake 1190 ml   Output 802 ml   Net 388 ml       Physical Exam:    General Appearance:sleepy, but arouses easily .  [place].    Skin: warm and dry  HEENT: oral mucosa very dry . nonicteric sclera  Neck: supple, no JVD  Lungs: Clear to auscultation  .  Heart: Irreg, irreg, tachy .  Abdomen: distended, tight.  High pitch bs.   : no palpable bladder  Extremities: no edema.  Neuro: normal speech and mental status     Scheduled Meds:     acetaminophen, 1,000 mg, Intravenous, Q8H  amiodarone, 300 mg, Oral, Q12H  aspirin, 81 mg, Oral, Daily  atenolol, 25 mg, Oral, Q12H  atorvastatin, 40 mg, Oral, Nightly  chlorhexidine, 15 mL, Mouth/Throat, Q12H  enoxaparin, 40 mg, Subcutaneous, Daily  guaiFENesin, 1,200 mg, Oral, Q12H  lidocaine, , Topical, Once  montelukast, 10 mg, Oral, Nightly  mupirocin, , Each Nare, BID  neostigmine, 2 mg, Intravenous, Once  pantoprazole, 40 mg, Oral, QAM  simethicone, 80 mg, Oral, 4x Daily  tamsulosin, 0.4 mg, Oral, Daily      IV Meds:   dextrose 5 % and sodium chloride 0.9 %, 125 mL/hr, Last Rate: 125 mL/hr (22)  sodium chloride, 30 mL/hr, Last Rate: 30 mL/hr (22  9290)        Results Reviewed:   I have personally reviewed the results from the time of this admission to 9/1/2022 11:32 EDT     Results from last 7 days   Lab Units 09/01/22  0316 08/31/22  0414 08/30/22  0454 08/26/22  1313 08/25/22  2326   SODIUM mmol/L 133* 131* 129*   < > 138   POTASSIUM mmol/L 3.4* 3.5 3.6   < > 3.9   CHLORIDE mmol/L 102 100 95*   < > 103   CO2 mmol/L 17.9* 20.7* 24.1   < > 27.5   BUN mg/dL 89* 73* 60*   < > 16   CREATININE mg/dL 2.17* 1.82* 1.98*   < > 1.04   CALCIUM mg/dL 9.0 8.6 8.8   < > 9.2   BILIRUBIN mg/dL 0.6  --   --   --  0.5   ALK PHOS U/L 80  --   --   --  44   ALT (SGPT) U/L 9  --   --   --  25   AST (SGOT) U/L 16  --   --   --  22   GLUCOSE mg/dL 145* 141* 132*   < > 115*    < > = values in this interval not displayed.       Estimated Creatinine Clearance: 41.8 mL/min (A) (by C-G formula based on SCr of 2.17 mg/dL (H)).    Results from last 7 days   Lab Units 09/01/22 0316 08/31/22 0414 08/27/22 0318   MAGNESIUM mg/dL 3.0* 2.8* 2.2   PHOSPHORUS mg/dL 4.4 4.0 4.4       Results from last 7 days   Lab Units 08/31/22 0414   URIC ACID mg/dL 7.9*       Results from last 7 days   Lab Units 09/01/22  0316 08/31/22  1125 08/30/22  0454 08/29/22  0322 08/28/22  0217   WBC 10*3/mm3 19.05* 19.18* 17.37* 13.60* 15.04*   HEMOGLOBIN g/dL 11.8* 12.7* 12.7* 11.3* 11.4*   PLATELETS 10*3/mm3 327 308 269 168 128*       Results from last 7 days   Lab Units 08/27/22 0318 08/26/22  1313 08/25/22  2326   INR  1.27* 1.21* 1.05       Assessment / Plan     ASSESSMENT:  1. VIRA, hypovolemic. BUN and  Creatinine worse.  Suspect he is 3rd spacing fluids .  Change IVF to bicarb containing IVF and replace K.   2. CABG, 4 vessel 8/26/2.   3. Colonic ileus .  NPO, daily suppositories. KUB worse. . Dr. Bundy following and has ordered Neostigmine.   WBC rising .  4.  BPH with prior TURP, dystophic calcification. Terrazas placed by  in OR and removed 8/29. Bladder scans with 200 cc.   5. Afib, tachycardia  .      PLAN:  1. Change IVF to bicarb containing fluids .  2 . DW daughter and patient .  Denise Alfonso MD  09/01/22  11:32 EDT    [facility]  [phone]

## 2022-09-01 NOTE — PLAN OF CARE
Goal Outcome Evaluation:  Plan of Care Reviewed With: patient, family        Progress: improving     Patient continues to have issues with pain in his chest/abdomen. Patient had x3 BM's this shift. Patient VSS Patient ambulating in room to bathroom and walked in the hallway with no issues. Patient able to urinate on his own and coude was not placed per patient family request. Bladder scan below 400 on subsequent bladder scans after urinating. Will continue to monitor. Plans for rehab at discharge.

## 2022-09-01 NOTE — PROGRESS NOTES
First Urology Update  9/1/2022  06:16 EDT      Able to void on his own overnight  Declined Terrazas catheter    Down getting CT this AM    - Return to clinic within 1-2 weeks with Dr. Alonzo Romero ([facility]) for voiding update - the Urology schedulers have been notified.   - Urology will sign off; please call with any questions/concerns or clinical change             Westley Contreras MD  [facility]  General & Reconstructive Urology  Office: [phone]  Fax: [phone]

## 2022-09-01 NOTE — SIGNIFICANT NOTE
09/01/22 1527   OTHER   Discipline physical therapist   Rehab Time/Intention   Session Not Performed other (see comments)  (attempted to see patient this morning, family requested PT check back later due to pt resting. came back this afternoon and RN reports patient on bedrest for 4 hours after receiving medicine. PT will f/u tomorrow)   Recommendation   PT - Next Appointment 09/02/22

## 2022-09-01 NOTE — PROGRESS NOTES
LOS: 7 days   Patient Care Team:  Keely Lo APRN as PCP - General (Family Medicine)  Jeffrey Inman MD as Consulting Physician (Cardiology)    Chief Complaint: post op    Subjective      Vital Signs  Temp:  [97.4 °F (36.3 °C)-97.8 °F (36.6 °C)] 97.8 °F (36.6 °C)  Heart Rate:  [] 84  Resp:  [20-26] 20  BP: (110-135)/(70-93) 134/78  Body mass index is 35.29 kg/m².    Intake/Output Summary (Last 24 hours) at 9/1/2022 0755  Last data filed at 9/1/2022 0631  Gross per 24 hour   Intake 1130 ml   Output 802 ml   Net 328 ml     No intake/output data recorded.    Chest tube drainage last 8 hours         08/30/22  0738 08/31/22  0737 09/01/22  0631   Weight: 135 kg (298 lb 3.2 oz) 135 kg (298 lb 8 oz) 135 kg (297 lb 11.2 oz)         Objective    Results Review:        WBC WBC   Date Value Ref Range Status   09/01/2022 19.05 (H) 3.40 - 10.80 10*3/mm3 Final   08/31/2022 19.18 (H) 3.40 - 10.80 10*3/mm3 Final   08/30/2022 17.37 (H) 3.40 - 10.80 10*3/mm3 Final      HGB Hemoglobin   Date Value Ref Range Status   09/01/2022 11.8 (L) 13.0 - 17.7 g/dL Final   08/31/2022 12.7 (L) 13.0 - 17.7 g/dL Final   08/30/2022 12.7 (L) 13.0 - 17.7 g/dL Final      HCT Hematocrit   Date Value Ref Range Status   09/01/2022 35.0 (L) 37.5 - 51.0 % Final   08/31/2022 35.8 (L) 37.5 - 51.0 % Final   08/30/2022 37.2 (L) 37.5 - 51.0 % Final      Platelets Platelets   Date Value Ref Range Status   09/01/2022 327 140 - 450 10*3/mm3 Final   08/31/2022 308 140 - 450 10*3/mm3 Final   08/30/2022 269 140 - 450 10*3/mm3 Final        PT/INR:  No results found for: PROTIME/No results found for: INR    Sodium Sodium   Date Value Ref Range Status   09/01/2022 133 (L) 136 - 145 mmol/L Final   08/31/2022 131 (L) 136 - 145 mmol/L Final   08/30/2022 129 (L) 136 - 145 mmol/L Final      Potassium Potassium   Date Value Ref Range Status   09/01/2022 3.4 (L) 3.5 - 5.2 mmol/L Final   08/31/2022 3.5 3.5 - 5.2 mmol/L Final   08/30/2022 3.6 3.5 - 5.2 mmol/L Final       Chloride Chloride   Date Value Ref Range Status   09/01/2022 102 98 - 107 mmol/L Final   08/31/2022 100 98 - 107 mmol/L Final   08/30/2022 95 (L) 98 - 107 mmol/L Final      Bicarbonate CO2   Date Value Ref Range Status   09/01/2022 17.9 (L) 22.0 - 29.0 mmol/L Final   08/31/2022 20.7 (L) 22.0 - 29.0 mmol/L Final   08/30/2022 24.1 22.0 - 29.0 mmol/L Final      BUN BUN   Date Value Ref Range Status   09/01/2022 89 (H) 8 - 23 mg/dL Final   08/31/2022 73 (H) 8 - 23 mg/dL Final   08/30/2022 60 (H) 8 - 23 mg/dL Final      Creatinine Creatinine   Date Value Ref Range Status   09/01/2022 2.17 (H) 0.76 - 1.27 mg/dL Final   08/31/2022 1.82 (H) 0.76 - 1.27 mg/dL Final   08/30/2022 1.98 (H) 0.76 - 1.27 mg/dL Final      Calcium Calcium   Date Value Ref Range Status   09/01/2022 9.0 8.6 - 10.5 mg/dL Final   08/31/2022 8.6 8.6 - 10.5 mg/dL Final   08/30/2022 8.8 8.6 - 10.5 mg/dL Final      Magnesium Magnesium   Date Value Ref Range Status   09/01/2022 3.0 (H) 1.6 - 2.4 mg/dL Final   08/31/2022 2.8 (H) 1.6 - 2.4 mg/dL Final          acetaminophen, 1,000 mg, Intravenous, Q8H  amiodarone, 300 mg, Oral, Q12H  aspirin, 81 mg, Oral, Daily  atenolol, 25 mg, Oral, Q12H  atorvastatin, 40 mg, Oral, Nightly  chlorhexidine, 15 mL, Mouth/Throat, Q12H  enoxaparin, 40 mg, Subcutaneous, Daily  guaiFENesin, 1,200 mg, Oral, Q12H  lactulose, 300 mL, Rectal, Once  lidocaine, , Topical, Once  montelukast, 10 mg, Oral, Nightly  mupirocin, , Each Nare, BID  neostigmine, 2 mg, Intravenous, Once  pantoprazole, 40 mg, Oral, QAM  simethicone, 80 mg, Oral, 4x Daily      dextrose 5 % and sodium chloride 0.9 %, 125 mL/hr, Last Rate: 125 mL/hr (08/31/22 4233)  sodium chloride, 30 mL/hr, Last Rate: 30 mL/hr (08/27/22 5860)            Patient Active Problem List   Diagnosis Code   • OA (osteoarthritis) of knee M17.10   • CAD (coronary artery disease) I25.10   • CAD (coronary artery disease), native coronary artery I25.10       Assessment & Plan       -Severe  multivessel CAD- s/p CABGx4 LIMA/LSVG- [place]- POD#6  -CKD stage II, BL creatinine 1.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

## 2022-09-02 ENCOUNTER — APPOINTMENT (OUTPATIENT)
Dept: GENERAL RADIOLOGY | Facility: HOSPITAL | Age: 77
End: 2022-09-02

## 2022-09-02 LAB
ALBUMIN SERPL-MCNC: 2.9 G/DL (ref 3.5–5.2)
ALBUMIN/GLOB SERPL: 1.1 G/DL
ALP SERPL-CCNC: 65 U/L (ref 39–117)
ALT SERPL W P-5'-P-CCNC: 9 U/L (ref 1–41)
ANION GAP SERPL CALCULATED.3IONS-SCNC: 11 MMOL/L (ref 5–15)
AST SERPL-CCNC: 22 U/L (ref 1–40)
BILIRUB SERPL-MCNC: 0.5 MG/DL (ref 0–1.2)
BUN SERPL-MCNC: 80 MG/DL (ref 8–23)
BUN/CREAT SERPL: 51 (ref 7–25)
CALCIUM SPEC-SCNC: 8.4 MG/DL (ref 8.6–10.5)
CHLORIDE SERPL-SCNC: 109 MMOL/L (ref 98–107)
CO2 SERPL-SCNC: 20 MMOL/L (ref 22–29)
CREAT SERPL-MCNC: 1.57 MG/DL (ref 0.76–1.27)
DEPRECATED RDW RBC AUTO: 42.2 FL (ref 37–54)
EGFRCR SERPLBLD CKD-EPI 2021: 44.6 ML/MIN/1.73
ERYTHROCYTE [DISTWIDTH] IN BLOOD BY AUTOMATED COUNT: 12.8 % (ref 12.3–15.4)
GLOBULIN UR ELPH-MCNC: 2.7 GM/DL
GLUCOSE BLDC GLUCOMTR-MCNC: 101 MG/DL (ref 70–130)
GLUCOSE BLDC GLUCOMTR-MCNC: 106 MG/DL (ref 70–130)
GLUCOSE BLDC GLUCOMTR-MCNC: 111 MG/DL (ref 70–130)
GLUCOSE BLDC GLUCOMTR-MCNC: 138 MG/DL (ref 70–130)
GLUCOSE SERPL-MCNC: 110 MG/DL (ref 65–99)
HCT VFR BLD AUTO: 33.5 % (ref 37.5–51)
HGB BLD-MCNC: 11.6 G/DL (ref 13–17.7)
MAGNESIUM SERPL-MCNC: 2.9 MG/DL (ref 1.6–2.4)
MAGNESIUM SERPL-MCNC: 3.1 MG/DL (ref 1.6–2.4)
MCH RBC QN AUTO: 31.2 PG (ref 26.6–33)
MCHC RBC AUTO-ENTMCNC: 34.6 G/DL (ref 31.5–35.7)
MCV RBC AUTO: 90.1 FL (ref 79–97)
PLATELET # BLD AUTO: 306 10*3/MM3 (ref 140–450)
PMV BLD AUTO: 9.8 FL (ref 6–12)
POTASSIUM SERPL-SCNC: 3.5 MMOL/L (ref 3.5–5.2)
PROT SERPL-MCNC: 5.6 G/DL (ref 6–8.5)
RBC # BLD AUTO: 3.72 10*6/MM3 (ref 4.14–5.8)
SODIUM SERPL-SCNC: 140 MMOL/L (ref 136–145)
WBC NRBC COR # BLD: 14.84 10*3/MM3 (ref 3.4–10.8)

## 2022-09-02 PROCEDURE — 94664 DEMO&/EVAL PT USE INHALER: CPT

## 2022-09-02 PROCEDURE — 82962 GLUCOSE BLOOD TEST: CPT

## 2022-09-02 PROCEDURE — 25010000002 ENOXAPARIN PER 10 MG: Performed by: NURSE PRACTITIONER

## 2022-09-02 PROCEDURE — 94799 UNLISTED PULMONARY SVC/PX: CPT

## 2022-09-02 PROCEDURE — 97530 THERAPEUTIC ACTIVITIES: CPT

## 2022-09-02 PROCEDURE — 80053 COMPREHEN METABOLIC PANEL: CPT | Performed by: INTERNAL MEDICINE

## 2022-09-02 PROCEDURE — 85027 COMPLETE CBC AUTOMATED: CPT | Performed by: INTERNAL MEDICINE

## 2022-09-02 PROCEDURE — 83735 ASSAY OF MAGNESIUM: CPT | Performed by: THORACIC SURGERY (CARDIOTHORACIC VASCULAR SURGERY)

## 2022-09-02 PROCEDURE — 25010000002 NEOSTIGMINE 5 MG/10ML SOLUTION: Performed by: SURGERY

## 2022-09-02 PROCEDURE — 83735 ASSAY OF MAGNESIUM: CPT | Performed by: NURSE PRACTITIONER

## 2022-09-02 PROCEDURE — 74018 RADEX ABDOMEN 1 VIEW: CPT

## 2022-09-02 RX ORDER — NEOSTIGMINE METHYLSULFATE 0.5 MG/ML
2 INJECTION, SOLUTION INTRAVENOUS ONCE
Status: COMPLETED | OUTPATIENT
Start: 2022-09-02 | End: 2022-09-02

## 2022-09-02 RX ORDER — ACETAMINOPHEN 10 MG/ML
1000 INJECTION, SOLUTION INTRAVENOUS EVERY 8 HOURS PRN
Status: DISCONTINUED | OUTPATIENT
Start: 2022-09-02 | End: 2022-09-09 | Stop reason: HOSPADM

## 2022-09-02 RX ORDER — POTASSIUM CHLORIDE 1.5 G/1.77G
40 POWDER, FOR SOLUTION ORAL ONCE
Status: COMPLETED | OUTPATIENT
Start: 2022-09-02 | End: 2022-09-02

## 2022-09-02 RX ADMIN — IPRATROPIUM BROMIDE AND ALBUTEROL SULFATE 3 ML: .5; 3 SOLUTION RESPIRATORY (INHALATION) at 21:24

## 2022-09-02 RX ADMIN — SIMETHICONE 80 MG: 80 TABLET, CHEWABLE ORAL at 16:20

## 2022-09-02 RX ADMIN — ENOXAPARIN SODIUM 40 MG: 100 INJECTION SUBCUTANEOUS at 18:28

## 2022-09-02 RX ADMIN — PANTOPRAZOLE SODIUM 40 MG: 40 TABLET, DELAYED RELEASE ORAL at 08:39

## 2022-09-02 RX ADMIN — SODIUM CHLORIDE SOLN NEBU 7% 4 ML: 7 NEBU SOLN at 21:27

## 2022-09-02 RX ADMIN — ASPIRIN 81 MG: 81 TABLET, COATED ORAL at 08:39

## 2022-09-02 RX ADMIN — AMIODARONE HYDROCHLORIDE 300 MG: 200 TABLET ORAL at 08:39

## 2022-09-02 RX ADMIN — MUPIROCIN 1 APPLICATION: 20 OINTMENT TOPICAL at 08:40

## 2022-09-02 RX ADMIN — IPRATROPIUM BROMIDE AND ALBUTEROL SULFATE 3 ML: .5; 3 SOLUTION RESPIRATORY (INHALATION) at 17:08

## 2022-09-02 RX ADMIN — SIMETHICONE 80 MG: 80 TABLET, CHEWABLE ORAL at 20:51

## 2022-09-02 RX ADMIN — MUPIROCIN: 20 OINTMENT TOPICAL at 21:12

## 2022-09-02 RX ADMIN — SODIUM BICARBONATE: 84 INJECTION, SOLUTION INTRAVENOUS at 04:05

## 2022-09-02 RX ADMIN — SIMETHICONE 80 MG: 80 TABLET, CHEWABLE ORAL at 12:40

## 2022-09-02 RX ADMIN — ATENOLOL 25 MG: 25 TABLET ORAL at 20:51

## 2022-09-02 RX ADMIN — SIMETHICONE 80 MG: 80 TABLET, CHEWABLE ORAL at 08:39

## 2022-09-02 RX ADMIN — POTASSIUM CHLORIDE 40 MEQ: 1.5 POWDER, FOR SOLUTION ORAL at 16:20

## 2022-09-02 RX ADMIN — AMIODARONE HYDROCHLORIDE 300 MG: 200 TABLET ORAL at 20:51

## 2022-09-02 RX ADMIN — ATENOLOL 25 MG: 25 TABLET ORAL at 08:39

## 2022-09-02 RX ADMIN — NEOSTIGMINE METHYLSULFATE 2 MG: 0.5 INJECTION INTRAVENOUS at 12:41

## 2022-09-02 NOTE — PROGRESS NOTES
Abdomen was softer and less distended yesterday afternoon, follow up KUB showed slight improved degree of colonic distension. Will repeat KUB and determine if additional dose of neostigmine warranted.

## 2022-09-02 NOTE — THERAPY TREATMENT NOTE
Patient Name: Chaitanya Montesinos  : 1945    MRN: [id number]                              Today's Date: 2022       Admit Date: 2022    Visit Dx:     ICD-10-CM ICD-9-CM   1. S/P CABG (coronary artery bypass graft)  Z95.1 V45.81   2. CAD (coronary artery disease)  I25.10 414.00     Patient Active Problem List   Diagnosis   • OA (osteoarthritis) of knee   • CAD (coronary artery disease)   • CAD (coronary artery disease), native coronary artery     Past Medical History:   Diagnosis Date   • Arthritis    • BPH (benign prostatic hyperplasia)    • Eczema    • Gout    • Hard to intubate     PT'S WIFE SAID DR SANCHEZ SAID PT WAS HARD TO INTUBATE 2017   • High cholesterol    • Hypertension    • Joint pain    • Low back pain      Past Surgical History:   Procedure Laterality Date   • CATARACT EXTRACTION Right    • CORONARY ARTERY BYPASS GRAFT N/A 2022    Procedure: MEL STERNOTOMY CORONARY ARTERY BYPASS GRAFT TIMES 4 USING LEFT INTERNAL MAMMARY ARTERY AND LEFT GREATER SAPHENOUS VEIN GRAFT PER ENDOSCOPIC VEIN HARVESTING AND PRP;  Surgeon: Jr Wayne Ruiz MD;  Location: [facility];  Service: Cardiothoracic;  Laterality: N/A;   • CYSTOSCOPY N/A 2022    Procedure: CYSTOSCOPY WITH SUNG CATHETER PLACEMENT;  Surgeon: Westley Contreras MD;  Location: [facility];  Service: Urology;  Laterality: N/A;   • CYSTOSCOPY TRANSURETHRAL RESECTION OF PROSTATE     • KNEE ARTHROSCOPY Bilateral    • [place] TOTAL KNEE ARTHROPLASTY Right 2017    Procedure: RT TOTAL KNEE ARTHROPLASTY;  Surgeon: Arvind Jones MD;  Location: [facility] OR;  Service: Orthopedics   • [place] TOTAL KNEE ARTHROPLASTY Left 2017    Procedure: LT TOTAL KNEE ARTHROPLASTY;  Surgeon: Arvind Jones MD;  Location: [facility] OR;  Service: Orthopedics   • TONSILLECTOMY        General Information     Row Name 22 1334          Physical Therapy Time and Intention    Document Type therapy note (daily note)  -EM     Mode of Treatment  individual therapy;physical therapy  -EM     Row Name 09/02/22 1334          General Information    Existing Precautions/Restrictions fall;cardiac;sternal  -EM           User Key  (r) = Recorded By, (t) = Taken By, (c) = Cosigned By    Initials Name Provider Type    Sonam Colbert PT Physical Therapist               Mobility     Row Name 09/02/22 1334          Bed Mobility    Comment, (Bed Mobility) not tested, up in chair  -EM     Row Name 09/02/22 1334          Sit-Stand Transfer    Sit-Stand [place] (Transfers) maximum assist (25% patient effort);verbal cues  -EM     Assistive Device (Sit-Stand Transfers) walker, front-wheeled  -EM     Row Name 09/02/22 1334          Gait/Stairs (Locomotion)    [place] Level (Gait) contact guard  -EM     Assistive Device (Gait) walker, front-wheeled  -EM     Distance in Feet (Gait) 120  -EM     Deviations/Abnormal Patterns (Gait) stride length decreased;gait speed decreased  -EM     Bilateral Gait Deviations forward flexed posture  -EM     Comment, (Gait/Stairs) very slow pace with ambulation, short step length, very fatigued after one lap around nurses unit  -EM           User Key  (r) = Recorded By, (t) = Taken By, (c) = Cosigned By    Initials Name Provider Type    Sonam Colbert PT Physical Therapist               Obj/Interventions     Row Name 09/02/22 1340 09/02/22 1336       Motor Skills    Therapeutic Exercise other (see comments)  -EM other (see comments)  5 reps per cardiac protocol, level 3  -EM          User Key  (r) = Recorded By, (t) = Taken By, (c) = Cosigned By    Initials Name Provider Type    Sonam Colbert PT Physical Therapist               Goals/Plan     Row Name 09/02/22 1339          Patient Education Goal (PT)    Activity (Patient Education Goal, PT) Pt able to complete level 4 per cardiac protocol  -EM     [place]/Cues/Accuracy (Memory Goal 2, PT) demonstrates adequately;verbalizes understanding  -EM     Time  Frame (Patient Education Goal, PT) 1 week  -EM     Progress/Outcome (Patient Education Goal, PT) goal revised this date  -EM           User Key  (r) = Recorded By, (t) = Taken By, (c) = Cosigned By    Initials Name Provider Type    Sonam Colbert, PT Physical Therapist               Clinical Impression     Row Name 09/02/22 1336          Pain    Pretreatment Pain Rating 5/10  -EM     Pain Location - abdomen  -EM     Pain Intervention(s) Medication (See MAR)  -EM     Row Name 09/02/22 1336          Plan of Care Review    Plan of Care Reviewed With patient;daughter  -EM     Outcome Evaluation Pt sitting up in chair, agreeable to therapy although reports he does not feel any better. Pt performed 5 reps per cardiac protocol, sit to stand with maxA, ambulated around nurses unit with CGA demonstrating very slow pace and decreased step length. Pt fatigued upon return to room, d/c plans remain SNU.  -EM     Row Name 09/02/22 1336          Therapy Assessment/Plan (PT)    Therapy Frequency (PT) 6 times/wk  -EM     Row Name 09/02/22 1336          Positioning and Restraints    Pre-Treatment Position sitting in chair/recliner  -EM     Post Treatment Position chair  -EM     In Chair reclined;call light within reach;exit alarm on;with family/caregiver  -EM           User Key  (r) = Recorded By, (t) = Taken By, (c) = Cosigned By    Initials Name Provider Type    Sonam Colbert, PT Physical Therapist               Outcome Measures     Row Name 09/02/22 1340 09/02/22 0839       How much help from another person do you currently need...    Turning from your back to your side while in flat bed without using bedrails? 3  -EM 3  -HQ    Moving from lying on back to sitting on the side of a flat bed without bedrails? 2  -EM 3  -HQ    Moving to and from a bed to a chair (including a wheelchair)? 3  -EM 3  -HQ    Standing up from a chair using your arms (e.g., wheelchair, bedside chair)? 2  -EM 2  -HQ    Climbing 3-5 steps  with a railing? 2  -EM 2  -HQ    To walk in hospital room? 3  -EM 3  -HQ    AM-PAC 6 Clicks Score (PT) 15  -EM 16  -HQ    Highest level of mobility 4 --> Transferred to chair/commode  -EM 5 --> Static standing  -          User Key  (r) = Recorded By, (t) = Taken By, (c) = Cosigned By    Initials Name Provider Type    EM Sonam Bauer, PT Physical Therapist     Africa Ramos RN Registered Nurse                             Physical Therapy Education                 Title: PT OT SLP Therapies (Done)     Topic: Physical Therapy (Done)     Point: Mobility training (Done)     Learning Progress Summary           Patient Acceptance, E, VU by  at 9/2/2022 1340    Acceptance, E, NR,VU by  at 8/31/2022 1321    Acceptance, E,TB,D, VU,NR by CB at 8/30/2022 1527    Acceptance, E, VU by EM at 8/28/2022 1349    Acceptance, E, VU by  at 8/27/2022 1444                   Point: Home exercise program (Done)     Learning Progress Summary           Patient Acceptance, E,TB,D, VU,NR by CB at 8/30/2022 1527    Acceptance, E, VU by EM at 8/28/2022 1349    Acceptance, E, VU by  at 8/27/2022 1444                   Point: Body mechanics (Done)     Learning Progress Summary           Patient Acceptance, E,TB,D, VU,NR by CB at 8/30/2022 1527                   Point: Precautions (Done)     Learning Progress Summary           Patient Acceptance, E,TB,D, VU,NR by  at 8/30/2022 1527                               User Key     Initials Effective Dates Name Provider Type Discipline     06/16/21 -  Sonam Bauer, PT Physical Therapist PT    CB 10/22/21 -  Barbie Peña, PT Physical Therapist PT              PT Recommendation and Plan  Planned Therapy Interventions (PT): bed mobility training, gait training, home exercise program, patient/family education, transfer training  Plan of Care Reviewed With: patient, daughter  Outcome Evaluation: Pt sitting up in chair, agreeable to therapy although reports he does not feel  any better. Pt performed 5 reps per cardiac protocol, sit to stand with maxA, ambulated around nurses unit with CGA demonstrating very slow pace and decreased step length. Pt fatigued upon return to room, d/c plans remain SNU.     Time Calculation:    PT Charges     Row Name 09/02/22 1341             Time Calculation    Start Time 1123  -EM      Stop Time 1142  -EM      Time Calculation (min) 19 min  -EM      PT Received On 09/02/22  -EM      PT - Next Appointment 09/03/22  -EM              Time Calculation- PT    Total Timed Code Minutes- PT 19 minute(s)  -EM              Timed Charges    64637 - PT Therapeutic Exercise Minutes 9  -EM      25991 - PT Therapeutic Activity Minutes 10  -EM              Total Minutes    Timed Charges Total Minutes 19  -EM       Total Minutes 19  -EM            User Key  (r) = Recorded By, (t) = Taken By, (c) = Cosigned By    Initials Name Provider Type    EM Sonam Bauer PT Physical Therapist              Therapy Charges for Today     Code Description Service Date Service Provider Modifiers Qty    [id number]  PT THERAPEUTIC ACT EA 15 MIN 9/2/2022 Sonam Bauer PT GP 1          PT G-Codes  Outcome Measure Options: AM-PAC 6 Clicks Basic Mobility (PT)  AM-PAC 6 Clicks Score (PT): 15    Sonam Bauer PT  9/2/2022

## 2022-09-02 NOTE — NURSING NOTE
Bladder scanned with 487cc retaining. Patient asymptomatic at this time.     Per Urology - Hold off on straight cathing patient or placing a foss at this time. Per MD Patients retain during the night. 400cc output with external male purewick in place adequate per MD. They will reassess in the AM.

## 2022-09-02 NOTE — PROGRESS NOTES
LOS: 8 days   Patient Care Team:  Keely Lo APRN as PCP - General (Family Medicine)  Jeffrey Inman MD as Consulting Physician (Cardiology)    Chief Complaint: post op    Subjective      Vital Signs  Temp:  [97.4 °F (36.3 °C)-98.7 °F (37.1 °C)] 97.4 °F (36.3 °C)  Heart Rate:  [57-91] 84  Resp:  [18-19] 18  BP: (110-134)/(58-97) 131/93  Body mass index is 35.29 kg/m².    Intake/Output Summary (Last 24 hours) at 9/2/2022 0815  Last data filed at 9/2/2022 0405  Gross per 24 hour   Intake 1824 ml   Output 400 ml   Net 1424 ml     No intake/output data recorded.    Chest tube drainage last 8 hours         08/30/22  0738 08/31/22  0737 09/01/22  0631   Weight: 135 kg (298 lb 3.2 oz) 135 kg (298 lb 8 oz) 135 kg (297 lb 11.2 oz)         Objective    Results Review:        WBC WBC   Date Value Ref Range Status   09/02/2022 14.84 (H) 3.40 - 10.80 10*3/mm3 Final   09/01/2022 19.05 (H) 3.40 - 10.80 10*3/mm3 Final   08/31/2022 19.18 (H) 3.40 - 10.80 10*3/mm3 Final      HGB Hemoglobin   Date Value Ref Range Status   09/02/2022 11.6 (L) 13.0 - 17.7 g/dL Final   09/01/2022 11.8 (L) 13.0 - 17.7 g/dL Final   08/31/2022 12.7 (L) 13.0 - 17.7 g/dL Final      HCT Hematocrit   Date Value Ref Range Status   09/02/2022 33.5 (L) 37.5 - 51.0 % Final   09/01/2022 35.0 (L) 37.5 - 51.0 % Final   08/31/2022 35.8 (L) 37.5 - 51.0 % Final      Platelets Platelets   Date Value Ref Range Status   09/02/2022 306 140 - 450 10*3/mm3 Final   09/01/2022 327 140 - 450 10*3/mm3 Final   08/31/2022 308 140 - 450 10*3/mm3 Final        PT/INR:  No results found for: PROTIME/No results found for: INR    Sodium Sodium   Date Value Ref Range Status   09/02/2022 140 136 - 145 mmol/L Final   09/01/2022 133 (L) 136 - 145 mmol/L Final   08/31/2022 131 (L) 136 - 145 mmol/L Final      Potassium Potassium   Date Value Ref Range Status   09/02/2022 3.5 3.5 - 5.2 mmol/L Final   09/01/2022 3.4 (L) 3.5 - 5.2 mmol/L Final   08/31/2022 3.5 3.5 - 5.2 mmol/L Final       Chloride Chloride   Date Value Ref Range Status   09/02/2022 109 (H) 98 - 107 mmol/L Final   09/01/2022 102 98 - 107 mmol/L Final   08/31/2022 100 98 - 107 mmol/L Final      Bicarbonate CO2   Date Value Ref Range Status   09/02/2022 20.0 (L) 22.0 - 29.0 mmol/L Final   09/01/2022 17.9 (L) 22.0 - 29.0 mmol/L Final   08/31/2022 20.7 (L) 22.0 - 29.0 mmol/L Final      BUN BUN   Date Value Ref Range Status   09/02/2022 80 (H) 8 - 23 mg/dL Final   09/01/2022 89 (H) 8 - 23 mg/dL Final   08/31/2022 73 (H) 8 - 23 mg/dL Final      Creatinine Creatinine   Date Value Ref Range Status   09/02/2022 1.57 (H) 0.76 - 1.27 mg/dL Final   09/01/2022 2.17 (H) 0.76 - 1.27 mg/dL Final   08/31/2022 1.82 (H) 0.76 - 1.27 mg/dL Final      Calcium Calcium   Date Value Ref Range Status   09/02/2022 8.4 (L) 8.6 - 10.5 mg/dL Final   09/01/2022 9.0 8.6 - 10.5 mg/dL Final   08/31/2022 8.6 8.6 - 10.5 mg/dL Final      Magnesium Magnesium   Date Value Ref Range Status   09/02/2022 2.9 (H) 1.6 - 2.4 mg/dL Final   09/01/2022 3.0 (H) 1.6 - 2.4 mg/dL Final   08/31/2022 2.8 (H) 1.6 - 2.4 mg/dL Final          amiodarone, 300 mg, Oral, Q12H  aspirin, 81 mg, Oral, Daily  atenolol, 25 mg, Oral, Q12H  chlorhexidine, 15 mL, Mouth/Throat, Q12H  enoxaparin, 40 mg, Subcutaneous, Daily  lidocaine, , Topical, Once  mupirocin, , Each Nare, BID  neostigmine, 2 mg, Intravenous, Once  pantoprazole, 40 mg, Oral, QAM  simethicone, 80 mg, Oral, 4x Daily  sodium chloride, 4 mL, Nebulization, BID - RT      custom IV infusion builder, , Last Rate: 125 mL/hr at 09/02/22 0500            Patient Active Problem List   Diagnosis Code   • OA (osteoarthritis) of knee M17.10   • CAD (coronary artery disease) I25.10   • CAD (coronary artery disease), native coronary artery I25.10       Assessment & Plan       -Severe multivessel CAD- s/p CABGx4 LIMA/LSVG- [place]- POD#7  -CKD stage II, BL creatinine 1.2-1.4  -Gout  -Hypertension  -Leukocytosis- probable reactive  -Regrowth of prostate  with dystrophic calcifications--cystoscopy in the OR  -post op atrial fibrillation-will need AC whenever appropriate  -Colonic ileus-general surgery following        KUB reviewed this morning- plans for another dose of neostigmine per Dr. Bundy-- appreciate his expertise   Creatinine improved  Overnight with some urinary retention   Increase activity  Continue supportive care      Juliane Freitas, ROSEY  09/02/22  08:15 EDT

## 2022-09-02 NOTE — PROGRESS NOTES
Looks and feels much better.  Abdomen is much softer.  Although KUB this morning did not look improved, he certainly clinically is.  We will go ahead and start clear liquids and check KUB again in a.m.

## 2022-09-02 NOTE — PROGRESS NOTES
[facility] Progress Note      Patient Name: Chaitanya Montesinos  : 1945  MRN: [id number]  Primary Care Physician:  Keely Lo APRN  Date of admission: 2022    Subjective     Interval History:   Follow up VIRA  Has passed gas and stool earlier today  Breathing is comfortable, though requiring 2 L/min  Has been advanced to liquid diet, which she will begin shortly; +thirsty/hungry  1 L UOP already today; PVRs today 350-487 mL    Review of Systems:   As noted above    Objective     Vitals:   Temp:  [97.4 °F (36.3 °C)-98.7 °F (37.1 °C)] 97.4 °F (36.3 °C)  Heart Rate:  [55-91] 74  Resp:  [18] 18  BP: (110-152)/(57-97) 128/74  Flow (L/min):  [2] 2    Intake/Output Summary (Last 24 hours) at 2022 1553  Last data filed at 2022 1449  Gross per 24 hour   Intake 3350 ml   Output 1400 ml   Net 1950 ml       Physical Exam:    General Appearance: Overweight, chronically ill, fully oriented  Skin: warm and dry  HEENT: oral mucosa very dry, AT/NC  Neck: supple, no JVD  Lungs: Coarse breath sounds either flank; not labored  Heart: Irregularly irregular, not tachycardic  Abdomen: Less distended than earlier in week, + BS, softer, not tender  : no palpable bladder  Extremities: +1 edema  Neuro: normal speech and mental status     Scheduled Meds:     amiodarone, 300 mg, Oral, Q12H  aspirin, 81 mg, Oral, Daily  atenolol, 25 mg, Oral, Q12H  chlorhexidine, 15 mL, Mouth/Throat, Q12H  enoxaparin, 40 mg, Subcutaneous, Daily  lidocaine, , Topical, Once  mupirocin, , Each Nare, BID  pantoprazole, 40 mg, Oral, QAM  simethicone, 80 mg, Oral, 4x Daily  sodium chloride, 4 mL, Nebulization, BID - RT      IV Meds:   custom IV infusion builder, , Last Rate: 125 mL/hr at 22 0940        Results Reviewed:   I have personally reviewed the results from the time of this admission to 2022 15:53 EDT     Results from last 7 days   Lab Units 22  0331 22  0316 22  0414   SODIUM mmol/L  140 133* 131*   POTASSIUM mmol/L 3.5 3.4* 3.5   CHLORIDE mmol/L 109* 102 100   CO2 mmol/L 20.0* 17.9* 20.7*   BUN mg/dL 80* 89* 73*   CREATININE mg/dL 1.57* 2.17* 1.82*   CALCIUM mg/dL 8.4* 9.0 8.6   BILIRUBIN mg/dL 0.5 0.6  --    ALK PHOS U/L 65 80  --    ALT (SGPT) U/L 9 9  --    AST (SGOT) U/L 22 16  --    GLUCOSE mg/dL 110* 145* 141*       Estimated Creatinine Clearance: 57.7 mL/min (A) (by C-G formula based on SCr of 1.57 mg/dL (H)).    Results from last 7 days   Lab Units 09/02/22  0904 09/02/22  0331 09/01/22  0316 08/31/22  0414 08/27/22  0318   MAGNESIUM mg/dL 3.1* 2.9* 3.0* 2.8* 2.2   PHOSPHORUS mg/dL  --   --  4.4 4.0 4.4       Results from last 7 days   Lab Units 08/31/22  0414   URIC ACID mg/dL 7.9*       Results from last 7 days   Lab Units 09/02/22  0331 09/01/22  0316 08/31/22  1125 08/30/22  0454 08/29/22  0322   WBC 10*3/mm3 14.84* 19.05* 19.18* 17.37* 13.60*   HEMOGLOBIN g/dL 11.6* 11.8* 12.7* 12.7* 11.3*   PLATELETS 10*3/mm3 306 327 308 269 168       Results from last 7 days   Lab Units 08/27/22  0318   INR  1.27*       Assessment / Plan     ASSESSMENT:  1.  VIRA, nonoliguric, improving:  prerenal due to volume sequestration and hypotension; low-normal potassium; improving serum bicarbonate on bicarb drip; anion gap only 11  2.  CABG, 4-vessel, 8/26/2.   3.  Colonic ileus, improving  4.  BPH with prior TURP, dystophic calcification. [place] placed by  in OR and removed 8/29. Bladder scans still significant  5.  Afib, tachycardia       PLAN:  1.  Stop bicarb drip now that liquid diet to begin, though low threshold to resume IVF if he is unable to tolerate diet  2.  Replace potassium  3.  Surveillance labs  4.  Discussed with family members at bedside    Danny Hopson MD  09/02/22  15:53 EDT    [facility]  [phone]

## 2022-09-02 NOTE — PLAN OF CARE
Goal Outcome Evaluation:  Plan of Care Reviewed With: patient, daughter           Outcome Evaluation: Pt sitting up in chair, agreeable to therapy although reports he does not feel any better. Pt performed 5 reps per cardiac protocol, sit to stand with maxA, ambulated around nurses unit with CGA demonstrating very slow pace and decreased step length. Pt fatigued upon return to room, d/c plans remain SNU.

## 2022-09-03 ENCOUNTER — APPOINTMENT (OUTPATIENT)
Dept: GENERAL RADIOLOGY | Facility: HOSPITAL | Age: 77
End: 2022-09-03

## 2022-09-03 LAB
BACTERIA SPEC AEROBE CULT: NO GROWTH
BASOPHILS # BLD AUTO: 0.05 10*3/MM3 (ref 0–0.2)
BASOPHILS NFR BLD AUTO: 0.4 % (ref 0–1.5)
DEPRECATED RDW RBC AUTO: 48.1 FL (ref 37–54)
EOSINOPHIL # BLD AUTO: 0.69 10*3/MM3 (ref 0–0.4)
EOSINOPHIL NFR BLD AUTO: 6.1 % (ref 0.3–6.2)
ERYTHROCYTE [DISTWIDTH] IN BLOOD BY AUTOMATED COUNT: 13.2 % (ref 12.3–15.4)
GLUCOSE BLDC GLUCOMTR-MCNC: 107 MG/DL (ref 70–130)
GLUCOSE BLDC GLUCOMTR-MCNC: 121 MG/DL (ref 70–130)
GLUCOSE BLDC GLUCOMTR-MCNC: 135 MG/DL (ref 70–130)
GLUCOSE BLDC GLUCOMTR-MCNC: 140 MG/DL (ref 70–130)
HCT VFR BLD AUTO: 34.7 % (ref 37.5–51)
HGB BLD-MCNC: 11.4 G/DL (ref 13–17.7)
IMM GRANULOCYTES # BLD AUTO: 0.2 10*3/MM3 (ref 0–0.05)
IMM GRANULOCYTES NFR BLD AUTO: 1.8 % (ref 0–0.5)
LYMPHOCYTES # BLD AUTO: 0.94 10*3/MM3 (ref 0.7–3.1)
LYMPHOCYTES NFR BLD AUTO: 8.2 % (ref 19.6–45.3)
MCH RBC QN AUTO: 32.2 PG (ref 26.6–33)
MCHC RBC AUTO-ENTMCNC: 32.9 G/DL (ref 31.5–35.7)
MCV RBC AUTO: 98 FL (ref 79–97)
MONOCYTES # BLD AUTO: 1.11 10*3/MM3 (ref 0.1–0.9)
MONOCYTES NFR BLD AUTO: 9.7 % (ref 5–12)
NEUTROPHILS NFR BLD AUTO: 73.8 % (ref 42.7–76)
NEUTROPHILS NFR BLD AUTO: 8.41 10*3/MM3 (ref 1.7–7)
NRBC BLD AUTO-RTO: 0 /100 WBC (ref 0–0.2)
PLATELET # BLD AUTO: 318 10*3/MM3 (ref 140–450)
PMV BLD AUTO: 9.2 FL (ref 6–12)
QT INTERVAL: 409 MS
RBC # BLD AUTO: 3.54 10*6/MM3 (ref 4.14–5.8)
WBC NRBC COR # BLD: 11.4 10*3/MM3 (ref 3.4–10.8)

## 2022-09-03 PROCEDURE — 93010 ELECTROCARDIOGRAM REPORT: CPT | Performed by: INTERNAL MEDICINE

## 2022-09-03 PROCEDURE — 99232 SBSQ HOSP IP/OBS MODERATE 35: CPT | Performed by: STUDENT IN AN ORGANIZED HEALTH CARE EDUCATION/TRAINING PROGRAM

## 2022-09-03 PROCEDURE — 97110 THERAPEUTIC EXERCISES: CPT

## 2022-09-03 PROCEDURE — 93005 ELECTROCARDIOGRAM TRACING: CPT | Performed by: THORACIC SURGERY (CARDIOTHORACIC VASCULAR SURGERY)

## 2022-09-03 PROCEDURE — 85025 COMPLETE CBC W/AUTO DIFF WBC: CPT | Performed by: PHYSICIAN ASSISTANT

## 2022-09-03 PROCEDURE — 25010000002 ENOXAPARIN PER 10 MG: Performed by: NURSE PRACTITIONER

## 2022-09-03 PROCEDURE — 74018 RADEX ABDOMEN 1 VIEW: CPT

## 2022-09-03 PROCEDURE — 99024 POSTOP FOLLOW-UP VISIT: CPT | Performed by: THORACIC SURGERY (CARDIOTHORACIC VASCULAR SURGERY)

## 2022-09-03 PROCEDURE — 94761 N-INVAS EAR/PLS OXIMETRY MLT: CPT

## 2022-09-03 PROCEDURE — 97530 THERAPEUTIC ACTIVITIES: CPT

## 2022-09-03 PROCEDURE — 82962 GLUCOSE BLOOD TEST: CPT

## 2022-09-03 PROCEDURE — 94799 UNLISTED PULMONARY SVC/PX: CPT

## 2022-09-03 RX ORDER — POLYETHYLENE GLYCOL 3350 17 G/17G
17 POWDER, FOR SOLUTION ORAL DAILY
Status: DISCONTINUED | OUTPATIENT
Start: 2022-09-03 | End: 2022-09-09 | Stop reason: HOSPADM

## 2022-09-03 RX ORDER — POTASSIUM CHLORIDE 1.5 G/1.77G
40 POWDER, FOR SOLUTION ORAL ONCE
Status: COMPLETED | OUTPATIENT
Start: 2022-09-03 | End: 2022-09-03

## 2022-09-03 RX ADMIN — SIMETHICONE 80 MG: 80 TABLET, CHEWABLE ORAL at 20:50

## 2022-09-03 RX ADMIN — SIMETHICONE 80 MG: 80 TABLET, CHEWABLE ORAL at 13:01

## 2022-09-03 RX ADMIN — POTASSIUM CHLORIDE 40 MEQ: 1.5 POWDER, FOR SOLUTION ORAL at 13:01

## 2022-09-03 RX ADMIN — AMIODARONE HYDROCHLORIDE 300 MG: 200 TABLET ORAL at 20:49

## 2022-09-03 RX ADMIN — ACETAMINOPHEN 650 MG: 325 TABLET, FILM COATED ORAL at 18:36

## 2022-09-03 RX ADMIN — ASPIRIN 81 MG: 81 TABLET, COATED ORAL at 10:33

## 2022-09-03 RX ADMIN — POLYETHYLENE GLYCOL 3350 17 G: 17 POWDER, FOR SOLUTION ORAL at 13:01

## 2022-09-03 RX ADMIN — SIMETHICONE 80 MG: 80 TABLET, CHEWABLE ORAL at 10:33

## 2022-09-03 RX ADMIN — MUPIROCIN 1 APPLICATION: 20 OINTMENT TOPICAL at 10:33

## 2022-09-03 RX ADMIN — ATENOLOL 25 MG: 25 TABLET ORAL at 20:50

## 2022-09-03 RX ADMIN — ATENOLOL 25 MG: 25 TABLET ORAL at 10:33

## 2022-09-03 RX ADMIN — PANTOPRAZOLE SODIUM 40 MG: 40 TABLET, DELAYED RELEASE ORAL at 06:28

## 2022-09-03 RX ADMIN — SIMETHICONE 80 MG: 80 TABLET, CHEWABLE ORAL at 18:36

## 2022-09-03 RX ADMIN — AMIODARONE HYDROCHLORIDE 300 MG: 200 TABLET ORAL at 10:33

## 2022-09-03 RX ADMIN — ENOXAPARIN SODIUM 40 MG: 100 INJECTION SUBCUTANEOUS at 18:36

## 2022-09-03 NOTE — PROGRESS NOTES
"General Surgery Progress Note    Summary: Mr. Chaitanya Montesinos is a 77 y.o. year old gentleman with a resolving ileus status post CABG.  Overall continues to improve, KUB looks better today.  Passing gas and had liquid bowel movements.  Still feels a bit bloated.  Will advance to a full liquid diet and continue supportive measures.  We will continue to follow.    Chief Complaint: Abdominal distention    Interval Events: Received neostigmine again yesterday.  Had good results after this.  Does feel a bit more distended this morning but okay.  Tolerating his clears fine.    Vitals: /53 (BP Location: Right arm, Patient Position: Lying)   Pulse 68   Temp 97.6 °F (36.4 °C) (Oral)   Resp 18   Ht 195.6 cm (77.01\")   Wt 134 kg (296 lb 6.4 oz)   SpO2 91%   BMI 35.14 kg/m²     GENERAL: alert, well appearing, and in no distress, sitting comfortably in the chair  HEENT: normocephalic, atraumatic, moist mucous membranes, clear sclerae   CHEST: no increased work of breathing, symmetric air entry  CARDIAC: regular rate and rhythm    ABDOMEN: Soft, mildly distended  EXTREMITIES: no cyanosis, clubbing, or edema   SKIN: Warm and moist, no rashes    Labs:  Results from last 7 days   Lab Units 09/02/22  0331 09/01/22 0316 08/31/22  1125   WBC 10*3/mm3 14.84* 19.05* 19.18*   HEMOGLOBIN g/dL 11.6* 11.8* 12.7*   HEMATOCRIT % 33.5* 35.0* 35.8*   PLATELETS 10*3/mm3 306 327 308     Results from last 7 days   Lab Units 09/02/22  0331 09/01/22  0316 08/31/22  0414   SODIUM mmol/L 140 133* 131*   POTASSIUM mmol/L 3.5 3.4* 3.5   CHLORIDE mmol/L 109* 102 100   CO2 mmol/L 20.0* 17.9* 20.7*   BUN mg/dL 80* 89* 73*   CREATININE mg/dL 1.57* 2.17* 1.82*   CALCIUM mg/dL 8.4* 9.0 8.6   BILIRUBIN mg/dL 0.5 0.6  --    ALK PHOS U/L 65 80  --    ALT (SGPT) U/L 9 9  --    AST (SGOT) U/L 22 16  --    GLUCOSE mg/dL 110* 145* 141*           YARY NG MD  General and Endoscopic Surgery  [facility]    [address] [place] [address], [facility] " [address]  [place], [place], [address]  P: [phone]  F: [phone]

## 2022-09-03 NOTE — PROGRESS NOTES
-Severe multivessel CAD- s/p CABGx4 LIMA/LSVG- [place]- POD#7  -CKD stage II, BL creatinine 1.2-1.4  -Gout  -Hypertension  -Leukocytosis- probable reactive  -Regrowth of prostate with dystrophic calcifications--cystoscopy in the OR  -post op atrial fibrillation-will need AC whenever appropriate  -Colonic ileus-general surgery following      POD #8. Ileus appears to be improving. Abdomen is less distended. Positive bowel sounds. Patient denies nausea and reports bowel movement and flatus. Repeat KUB pending. Appreciate input from General Surgery. Encourage IS and ambulation. In rate controlled Afib. Will need anticoagulation at discharge. No labs this am. Will order BMP & CBC.

## 2022-09-03 NOTE — THERAPY TREATMENT NOTE
Patient Name: Chaitanya Montesinos  : 1945    MRN: [id number]                              Today's Date: 9/3/2022       Admit Date: 2022    Visit Dx:     ICD-10-CM ICD-9-CM   1. S/P CABG (coronary artery bypass graft)  Z95.1 V45.81   2. CAD (coronary artery disease)  I25.10 414.00     Patient Active Problem List   Diagnosis   • OA (osteoarthritis) of knee   • CAD (coronary artery disease)   • CAD (coronary artery disease), native coronary artery     Past Medical History:   Diagnosis Date   • Arthritis    • BPH (benign prostatic hyperplasia)    • Eczema    • Gout    • Hard to intubate     PT'S WIFE SAID DR SANCHEZ SAID PT WAS HARD TO INTUBATE 2017   • High cholesterol    • Hypertension    • Joint pain    • Low back pain      Past Surgical History:   Procedure Laterality Date   • CATARACT EXTRACTION Right    • CORONARY ARTERY BYPASS GRAFT N/A 2022    Procedure: MEL STERNOTOMY CORONARY ARTERY BYPASS GRAFT TIMES 4 USING LEFT INTERNAL MAMMARY ARTERY AND LEFT GREATER SAPHENOUS VEIN GRAFT PER ENDOSCOPIC VEIN HARVESTING AND PRP;  Surgeon: Jr Wayne Ruiz MD;  Location: [facility];  Service: Cardiothoracic;  Laterality: N/A;   • CYSTOSCOPY N/A 2022    Procedure: CYSTOSCOPY WITH SUNG CATHETER PLACEMENT;  Surgeon: Westley Contreras MD;  Location: [facility];  Service: Urology;  Laterality: N/A;   • CYSTOSCOPY TRANSURETHRAL RESECTION OF PROSTATE     • KNEE ARTHROSCOPY Bilateral    • NE TOTAL KNEE ARTHROPLASTY Right 2017    Procedure: RT TOTAL KNEE ARTHROPLASTY;  Surgeon: Arvind Jones MD;  Location: [facility] OR;  Service: Orthopedics   • NE TOTAL KNEE ARTHROPLASTY Left 2017    Procedure: LT TOTAL KNEE ARTHROPLASTY;  Surgeon: Arvind Jones MD;  Location: [facility] OR;  Service: Orthopedics   • TONSILLECTOMY        General Information     Row Name 22 1650          Physical Therapy Time and Intention    Document Type therapy note (daily note);progress  note/recertification  -AR     Mode of Treatment individual therapy;physical therapy  -AR     Row Name 09/03/22 1650          General Information    Patient Profile Reviewed yes  -AR     Existing Precautions/Restrictions fall;cardiac;sternal  -AR     Row Name 09/03/22 1650          Stairs Within Home, Primary    Stairs, Within Home, Primary no stairs into [facility] which is 1.5 hours away.  2 steps into [facility].  family and spouse will be w/ pt, spouse will not be able to assist much.  -AR     Row Name 09/03/22 1650          Cognition    Orientation Status (Cognition) oriented x 3  -AR     Row Name 09/03/22 1650          Safety Issues, Functional Mobility    Impairments Affecting Function (Mobility) balance;endurance/activity tolerance;strength;pain  -AR           User Key  (r) = Recorded By, (t) = Taken By, (c) = Cosigned By    Initials Name Provider Type    Claire Nuno PT Physical Therapist               Mobility     Row Name 09/03/22 1651          Bed Mobility    Bed Mobility supine-sit;sit-supine  -AR     Supine-Sit [place] (Bed Mobility) not tested  -AR     Sit-Supine [place] (Bed Mobility) minimum assist (75% patient effort);verbal cues  -AR     Comment, (Bed Mobility) HOB flat, cues for log rolling  -AR     Row Name 09/03/22 1651          Transfers    Comment, (Transfers) sit<>stand from bed, recliner, and toilet.  -AR     Row Name 09/03/22 1651          Sit-Stand Transfer    Sit-Stand [place] (Transfers) minimum assist (75% patient effort)  -AR     Assistive Device (Sit-Stand Transfers) walker, front-wheeled  -AR     Comment, (Sit-Stand Transfer) cues for scoot towards edge and rocking forward  -AR     Row Name 09/03/22 1651          Gait/Stairs (Locomotion)    [place] Level (Gait) contact guard  -AR     Assistive Device (Gait) walker, front-wheeled  -AR     Distance in Feet (Gait) 120, slow s huffling steps w/ few brief standing rest breaks. cues for posture  -AR      Deviations/Abnormal Patterns (Gait) stride length decreased;gait speed decreased  -AR           User Key  (r) = Recorded By, (t) = Taken By, (c) = Cosigned By    Initials Name Provider Type    AR Claire Maher, PT Physical Therapist               Obj/Interventions     Row Name 09/03/22 1652          Balance    Dynamic Standing Balance minimal assist  -AR     Position/Device Used, Standing Balance walker, rolling  -AR           User Key  (r) = Recorded By, (t) = Taken By, (c) = Cosigned By    Initials Name Provider Type    AR Claire Maher, PT Physical Therapist               Goals/Plan    No documentation.                Clinical Impression     Row Name 09/03/22 1652          Pain    Pretreatment Pain Rating 5/10  -AR     Posttreatment Pain Rating 5/10  -AR     Pain Location incisional  -AR     Pain Location - chest  -AR     Pain Intervention(s) Medication (See MAR);Repositioned  -AR     Row Name 09/03/22 1652          Plan of Care Review    Plan of Care Reviewed With patient  -AR     Outcome Evaluation Improved independence w/ sit>stand transfers during PT today, pt reports not ambulating this morning or earlier this afternoon yet.  Today pt able stand up w/ min A with cues for rocking forward.  Ambulated 120' using RW, slow shuffling steps w/ cues for posture; required few brief standing rest breaks but no safety concerns.  Increased time standing for hygiene after toileting.  Reviewed activity/walking recommendations to ambulate in butler at least 3x/day w/ staff.  Currently recommend DC to SNU but may be able to improve enough for safe Dc to home if he can get more practice ambulating on unit; discussed with dtr.  -AR     Row Name 09/03/22 1652          Therapy Assessment/Plan (PT)    Rehab Potential (PT) good, to achieve stated therapy goals  -AR     Therapy Frequency (PT) daily  -AR     Row Name 09/03/22 1652          Vital Signs    O2 Delivery Pre Treatment room air  -AR     Row Name 09/03/22 1652           Positioning and Restraints    Pre-Treatment Position sitting in chair/recliner  -AR     Post Treatment Position bed  -AR     In Bed notified nsg;supine;call light within reach;encouraged to call for assist;exit alarm on  -AR           User Key  (r) = Recorded By, (t) = Taken By, (c) = Cosigned By    Initials Name Provider Type    Claire Nuno, PT Physical Therapist               Outcome Measures     Row Name 09/03/22 1657 09/03/22 1052       How much help from another person do you currently need...    Turning from your back to your side while in flat bed without using bedrails? 3  -AR 3  -AG    Moving from lying on back to sitting on the side of a flat bed without bedrails? 3  -AR 2  -AG    Moving to and from a bed to a chair (including a wheelchair)? 3  -AR 3  -AG    Standing up from a chair using your arms (e.g., wheelchair, bedside chair)? 3  -AR 2  -AG    Climbing 3-5 steps with a railing? 2  -AR 2  -AG    To walk in hospital room? 3  -AR 3  -AG    AM-PAC 6 Clicks Score (PT) 17  -AR 15  -AG    Highest level of mobility 5 --> Static standing  -AR 4 --> Transferred to chair/commode  -AG    Row Name 09/03/22 0815          How much help from another person do you currently need...    Turning from your back to your side while in flat bed without using bedrails? 3  -AG     Moving from lying on back to sitting on the side of a flat bed without bedrails? 2  -AG     Moving to and from a bed to a chair (including a wheelchair)? 3  -AG     Standing up from a chair using your arms (e.g., wheelchair, bedside chair)? 2  -AG     Climbing 3-5 steps with a railing? 2  -AG     To walk in hospital room? 3  -AG     AM-PAC 6 Clicks Score (PT) 15  -AG     Highest level of mobility 4 --> Transferred to chair/commode  -AG     Row Name 09/03/22 1657          Functional Assessment    Outcome Measure Options AM-PAC 6 Clicks Basic Mobility (PT)  -AR           User Key  (r) = Recorded By, (t) = Taken By, (c) = Cosigned By     Initials Name Provider Type    AR Claire Maher, PT Physical Therapist    María Elena Nj, RN Registered Nurse                             Physical Therapy Education                 Title: PT OT SLP Therapies (In Progress)     Topic: Physical Therapy (In Progress)     Point: Mobility training (In Progress)     Learning Progress Summary           Patient Acceptance, E, NR by AR at 9/3/2022 1658    Acceptance, E, VU by EM at 9/2/2022 1340    Acceptance, E, NR,VU by EM at 8/31/2022 1321    Acceptance, E,TB,D, VU,NR by CB at 8/30/2022 1527    Acceptance, E, VU by EM at 8/28/2022 1349    Acceptance, E, VU by EM at 8/27/2022 1444   Family Acceptance, E, NR by AR at 9/3/2022 1658                   Point: Home exercise program (In Progress)     Learning Progress Summary           Patient Acceptance, E, NR by AR at 9/3/2022 1658    Acceptance, E,TB,D, VU,NR by CB at 8/30/2022 1527    Acceptance, E, VU by EM at 8/28/2022 1349    Acceptance, E, VU by EM at 8/27/2022 1444   Family Acceptance, E, NR by AR at 9/3/2022 1658                   Point: Body mechanics (In Progress)     Learning Progress Summary           Patient Acceptance, E, NR by AR at 9/3/2022 1658    Acceptance, E,TB,D, VU,NR by CB at 8/30/2022 1527   Family Acceptance, E, NR by AR at 9/3/2022 1658                   Point: Precautions (In Progress)     Learning Progress Summary           Patient Acceptance, E, NR by AR at 9/3/2022 1658    Acceptance, E,TB,D, VU,NR by CB at 8/30/2022 1527   Family Acceptance, E, NR by AR at 9/3/2022 1658                               User Key     Initials Effective Dates Name Provider Type Discipline    EM 06/16/21 -  Sonam Bauer, PT Physical Therapist PT    AR 06/16/21 -  Claire Maher, PT Physical Therapist PT    CB 10/22/21 -  Barbie Peña, PT Physical Therapist PT              PT Recommendation and Plan     Plan of Care Reviewed With: patient  Outcome Evaluation: Improved independence w/ sit>stand transfers  during PT today, pt reports not ambulating this morning or earlier this afternoon yet.  Today pt able stand up w/ min A with cues for rocking forward.  Ambulated 120' using RW, slow shuffling steps w/ cues for posture; required few brief standing rest breaks but no safety concerns.  Increased time standing for hygiene after toileting.  Reviewed activity/walking recommendations to ambulate in butler at least 3x/day w/ staff.  Currently recommend DC to SNU but may be able to improve enough for safe Dc to home if he can get more practice ambulating on unit; discussed with dtr.     Time Calculation:    PT Charges     Row Name 09/03/22 1648             Time Calculation    Start Time 1433  -AR      Stop Time 1515  -AR      Time Calculation (min) 42 min  -AR      PT Received On 09/03/22  -AR      PT - Next Appointment 09/04/22  -AR      PT Goal Re-Cert Due Date 09/10/22  -AR            User Key  (r) = Recorded By, (t) = Taken By, (c) = Cosigned By    Initials Name Provider Type    AR Claire Maher, PT Physical Therapist              Therapy Charges for Today     Code Description Service Date Service Provider Modifiers Qty    73618728632 HC PT THER PROC EA 15 MIN 9/3/2022 Claire Maher, PT GP 1    55353731153 HC PT THERAPEUTIC ACT EA 15 MIN 9/3/2022 Claire Maher, PT GP 2          PT G-Codes  Outcome Measure Options: AM-PAC 6 Clicks Basic Mobility (PT)  AM-PAC 6 Clicks Score (PT): 17    Claire Maher PT  9/3/2022

## 2022-09-03 NOTE — PLAN OF CARE
Goal Outcome Evaluation:  Plan of Care Reviewed With: patient, daughter        Progress: improving  Outcome Evaluation: VSS. Going between NSR with PVCs and Afib on tele. on RA this AM. 2 assist with walker. 1 lap around unit overnight (130 ft). Passing gas and liquid BM this AM. Abdomen is softer after BM. Tolerating clear liquid diet. Up in chair for breakfast. Will update as needed.

## 2022-09-03 NOTE — PROGRESS NOTES
[facility] Progress Note      Patient Name: Chaitanya Montesinos  : 1945  MRN: [id number]  Primary Care Physician:  Keely Lo APRN  Date of admission: 2022    Subjective     Interval History:   Follow up VIRA  Passed more gas and small stool this morning  Breathing is comfortable on room air  Tolerating liquid diet  PVRs >300-400s still    Review of Systems:   As noted above    Objective     Vitals:   Temp:  [97.1 °F (36.2 °C)-98.3 °F (36.8 °C)] 97.6 °F (36.4 °C)  Heart Rate:  [55-91] 68  Resp:  [18-20] 18  BP: (122-152)/(53-85) 145/53  Flow (L/min):  [2-22] 2    Intake/Output Summary (Last 24 hours) at 9/3/2022 1051  Last data filed at 9/3/2022 0811  Gross per 24 hour   Intake 1226 ml   Output 1100 ml   Net 126 ml       Physical Exam:    General Appearance: Overweight, chronically ill, fully oriented  Skin: warm and dry  HEENT: oral mucosa moist, AT/NC  Neck: supple, no JVD  Lungs: Coarse breath sounds either flank; not labored  Heart: Irregularly irregular, not tachycardic  Abdomen: Slightly distended, + BS, softer, not tender  : no palpable bladder  Extremities: +1 edema  Neuro: normal speech and mental status     Scheduled Meds:     amiodarone, 300 mg, Oral, Q12H  aspirin, 81 mg, Oral, Daily  atenolol, 25 mg, Oral, Q12H  chlorhexidine, 15 mL, Mouth/Throat, Q12H  enoxaparin, 40 mg, Subcutaneous, Daily  lidocaine, , Topical, Once  mupirocin, , Each Nare, BID  pantoprazole, 40 mg, Oral, QAM  simethicone, 80 mg, Oral, 4x Daily      IV Meds:        Results Reviewed:   I have personally reviewed the results from the time of this admission to 9/3/2022 10:51 EDT     Results from last 7 days   Lab Units 22  0331 22  0316 22  0414   SODIUM mmol/L 140 133* 131*   POTASSIUM mmol/L 3.5 3.4* 3.5   CHLORIDE mmol/L 109* 102 100   CO2 mmol/L 20.0* 17.9* 20.7*   BUN mg/dL 80* 89* 73*   CREATININE mg/dL 1.57* 2.17* 1.82*   CALCIUM mg/dL 8.4* 9.0 8.6   BILIRUBIN mg/dL 0.5  0.6  --    ALK PHOS U/L 65 80  --    ALT (SGPT) U/L 9 9  --    AST (SGOT) U/L 22 16  --    GLUCOSE mg/dL 110* 145* 141*       Estimated Creatinine Clearance: 57.7 mL/min (A) (by C-G formula based on SCr of 1.57 mg/dL (H)).    Results from last 7 days   Lab Units 09/02/22  0904 09/02/22  0331 09/01/22  0316 08/31/22  0414   MAGNESIUM mg/dL 3.1* 2.9* 3.0* 2.8*   PHOSPHORUS mg/dL  --   --  4.4 4.0       Results from last 7 days   Lab Units 08/31/22  0414   URIC ACID mg/dL 7.9*       Results from last 7 days   Lab Units 09/02/22  0331 09/01/22  0316 08/31/22  1125 08/30/22  0454 08/29/22  0322   WBC 10*3/mm3 14.84* 19.05* 19.18* 17.37* 13.60*   HEMOGLOBIN g/dL 11.6* 11.8* 12.7* 12.7* 11.3*   PLATELETS 10*3/mm3 306 327 308 269 168             Assessment / Plan     ASSESSMENT:  1.  VIRA, nonoliguric, stable:  prerenal due to volume sequestration and hypotension; low-normal potassium; improving serum bicarbonate; mild peripheral edema   2.  CABG, 4-vessel, 8/26/2.   3.  Colonic ileus, improving  4.  BPH with prior TURP, dystophic calcification. Terrazas placed by  in OR and removed 8/29. Bladder scans still significant  5.  Afib, tachycardia       PLAN:  1.  Replace potassium  2.  Needs to get out of bed more, which should help bowels and bladder work better  3.  Surveillance labs  4.  Discussed with daughter at bedside    Danny Hopson MD  09/03/22  10:51 EDT    [facility]  [phone]

## 2022-09-03 NOTE — PLAN OF CARE
Goal Outcome Evaluation:  Plan of Care Reviewed With: patient           Outcome Evaluation: Improved independence w/ sit>stand transfers during PT today, pt reports not ambulating this morning or earlier this afternoon yet.  Today pt able stand up w/ min A with cues for rocking forward.  Ambulated 120' using RW, slow shuffling steps w/ cues for posture; required few brief standing rest breaks but no safety concerns.  Increased time standing for hygiene after toileting.  Reviewed activity/walking recommendations to ambulate in butler at least 3x/day w/ staff.  Currently recommend DC to SNU but may be able to improve enough for safe Dc to home if he can get more practice ambulating on unit; discussed with dtr.

## 2022-09-04 ENCOUNTER — APPOINTMENT (OUTPATIENT)
Dept: GENERAL RADIOLOGY | Facility: HOSPITAL | Age: 77
End: 2022-09-04

## 2022-09-04 LAB
ALBUMIN SERPL-MCNC: 2.9 G/DL (ref 3.5–5.2)
ANION GAP SERPL CALCULATED.3IONS-SCNC: 9 MMOL/L (ref 5–15)
BASOPHILS # BLD AUTO: 0.06 10*3/MM3 (ref 0–0.2)
BASOPHILS NFR BLD AUTO: 0.7 % (ref 0–1.5)
BUN SERPL-MCNC: 42 MG/DL (ref 8–23)
BUN/CREAT SERPL: 41.2 (ref 7–25)
CALCIUM SPEC-SCNC: 8.7 MG/DL (ref 8.6–10.5)
CHLORIDE SERPL-SCNC: 108 MMOL/L (ref 98–107)
CO2 SERPL-SCNC: 22 MMOL/L (ref 22–29)
CREAT SERPL-MCNC: 1.02 MG/DL (ref 0.76–1.27)
DEPRECATED RDW RBC AUTO: 43.8 FL (ref 37–54)
EGFRCR SERPLBLD CKD-EPI 2021: 74.8 ML/MIN/1.73
EOSINOPHIL # BLD AUTO: 0.53 10*3/MM3 (ref 0–0.4)
EOSINOPHIL NFR BLD AUTO: 6.1 % (ref 0.3–6.2)
ERYTHROCYTE [DISTWIDTH] IN BLOOD BY AUTOMATED COUNT: 12.7 % (ref 12.3–15.4)
GLUCOSE BLDC GLUCOMTR-MCNC: 110 MG/DL (ref 70–130)
GLUCOSE BLDC GLUCOMTR-MCNC: 118 MG/DL (ref 70–130)
GLUCOSE BLDC GLUCOMTR-MCNC: 123 MG/DL (ref 70–130)
GLUCOSE BLDC GLUCOMTR-MCNC: 132 MG/DL (ref 70–130)
GLUCOSE SERPL-MCNC: 116 MG/DL (ref 65–99)
HCT VFR BLD AUTO: 32.4 % (ref 37.5–51)
HGB BLD-MCNC: 10.9 G/DL (ref 13–17.7)
IMM GRANULOCYTES # BLD AUTO: 0.32 10*3/MM3 (ref 0–0.05)
IMM GRANULOCYTES NFR BLD AUTO: 3.7 % (ref 0–0.5)
LYMPHOCYTES # BLD AUTO: 1.21 10*3/MM3 (ref 0.7–3.1)
LYMPHOCYTES NFR BLD AUTO: 14 % (ref 19.6–45.3)
MAGNESIUM SERPL-MCNC: 2.5 MG/DL (ref 1.6–2.4)
MCH RBC QN AUTO: 31.7 PG (ref 26.6–33)
MCHC RBC AUTO-ENTMCNC: 33.6 G/DL (ref 31.5–35.7)
MCV RBC AUTO: 94.2 FL (ref 79–97)
MONOCYTES # BLD AUTO: 1.08 10*3/MM3 (ref 0.1–0.9)
MONOCYTES NFR BLD AUTO: 12.5 % (ref 5–12)
NEUTROPHILS NFR BLD AUTO: 5.45 10*3/MM3 (ref 1.7–7)
NEUTROPHILS NFR BLD AUTO: 63 % (ref 42.7–76)
NRBC BLD AUTO-RTO: 0 /100 WBC (ref 0–0.2)
PHOSPHATE SERPL-MCNC: 2 MG/DL (ref 2.5–4.5)
PLATELET # BLD AUTO: 318 10*3/MM3 (ref 140–450)
PMV BLD AUTO: 9.8 FL (ref 6–12)
POTASSIUM SERPL-SCNC: 3.4 MMOL/L (ref 3.5–5.2)
POTASSIUM SERPL-SCNC: 4.2 MMOL/L (ref 3.5–5.2)
RBC # BLD AUTO: 3.44 10*6/MM3 (ref 4.14–5.8)
SODIUM SERPL-SCNC: 139 MMOL/L (ref 136–145)
WBC NRBC COR # BLD: 8.65 10*3/MM3 (ref 3.4–10.8)

## 2022-09-04 PROCEDURE — 74018 RADEX ABDOMEN 1 VIEW: CPT

## 2022-09-04 PROCEDURE — 99024 POSTOP FOLLOW-UP VISIT: CPT | Performed by: THORACIC SURGERY (CARDIOTHORACIC VASCULAR SURGERY)

## 2022-09-04 PROCEDURE — 97530 THERAPEUTIC ACTIVITIES: CPT

## 2022-09-04 PROCEDURE — 85025 COMPLETE CBC W/AUTO DIFF WBC: CPT | Performed by: PHYSICIAN ASSISTANT

## 2022-09-04 PROCEDURE — 80069 RENAL FUNCTION PANEL: CPT | Performed by: INTERNAL MEDICINE

## 2022-09-04 PROCEDURE — 82962 GLUCOSE BLOOD TEST: CPT

## 2022-09-04 PROCEDURE — 97110 THERAPEUTIC EXERCISES: CPT

## 2022-09-04 PROCEDURE — 94799 UNLISTED PULMONARY SVC/PX: CPT

## 2022-09-04 PROCEDURE — 84132 ASSAY OF SERUM POTASSIUM: CPT | Performed by: THORACIC SURGERY (CARDIOTHORACIC VASCULAR SURGERY)

## 2022-09-04 PROCEDURE — 83735 ASSAY OF MAGNESIUM: CPT | Performed by: THORACIC SURGERY (CARDIOTHORACIC VASCULAR SURGERY)

## 2022-09-04 PROCEDURE — 25010000002 ENOXAPARIN PER 10 MG: Performed by: NURSE PRACTITIONER

## 2022-09-04 PROCEDURE — 99232 SBSQ HOSP IP/OBS MODERATE 35: CPT | Performed by: STUDENT IN AN ORGANIZED HEALTH CARE EDUCATION/TRAINING PROGRAM

## 2022-09-04 RX ORDER — POTASSIUM CHLORIDE 1.5 G/1.77G
40 POWDER, FOR SOLUTION ORAL AS NEEDED
Status: DISCONTINUED | OUTPATIENT
Start: 2022-09-04 | End: 2022-09-09 | Stop reason: HOSPADM

## 2022-09-04 RX ORDER — POTASSIUM CHLORIDE 750 MG/1
40 TABLET, FILM COATED, EXTENDED RELEASE ORAL AS NEEDED
Status: DISCONTINUED | OUTPATIENT
Start: 2022-09-04 | End: 2022-09-09 | Stop reason: HOSPADM

## 2022-09-04 RX ADMIN — ENOXAPARIN SODIUM 40 MG: 100 INJECTION SUBCUTANEOUS at 17:19

## 2022-09-04 RX ADMIN — SIMETHICONE 80 MG: 80 TABLET, CHEWABLE ORAL at 17:19

## 2022-09-04 RX ADMIN — MUPIROCIN 1 APPLICATION: 20 OINTMENT TOPICAL at 21:16

## 2022-09-04 RX ADMIN — POTASSIUM CHLORIDE 40 MEQ: 1.5 POWDER, FOR SOLUTION ORAL at 16:07

## 2022-09-04 RX ADMIN — ASPIRIN 81 MG: 81 TABLET, COATED ORAL at 08:25

## 2022-09-04 RX ADMIN — SIMETHICONE 80 MG: 80 TABLET, CHEWABLE ORAL at 08:25

## 2022-09-04 RX ADMIN — POTASSIUM CHLORIDE 40 MEQ: 1.5 POWDER, FOR SOLUTION ORAL at 10:44

## 2022-09-04 RX ADMIN — MUPIROCIN 1 APPLICATION: 20 OINTMENT TOPICAL at 08:25

## 2022-09-04 RX ADMIN — ATENOLOL 25 MG: 25 TABLET ORAL at 21:15

## 2022-09-04 RX ADMIN — CHLORHEXIDINE GLUCONATE 15 ML: 1.2 SOLUTION ORAL at 21:15

## 2022-09-04 RX ADMIN — SIMETHICONE 80 MG: 80 TABLET, CHEWABLE ORAL at 12:01

## 2022-09-04 RX ADMIN — CHLORHEXIDINE GLUCONATE 15 ML: 1.2 SOLUTION ORAL at 08:24

## 2022-09-04 RX ADMIN — ATENOLOL 25 MG: 25 TABLET ORAL at 08:25

## 2022-09-04 RX ADMIN — POLYETHYLENE GLYCOL 3350 17 G: 17 POWDER, FOR SOLUTION ORAL at 16:11

## 2022-09-04 RX ADMIN — PANTOPRAZOLE SODIUM 40 MG: 40 TABLET, DELAYED RELEASE ORAL at 06:10

## 2022-09-04 RX ADMIN — SIMETHICONE 80 MG: 80 TABLET, CHEWABLE ORAL at 21:15

## 2022-09-04 NOTE — THERAPY TREATMENT NOTE
Patient Name: Chaitanya Montesinos  : 1945    MRN: [id number]                              Today's Date: 2022       Admit Date: 2022    Visit Dx:     ICD-10-CM ICD-9-CM   1. S/P CABG (coronary artery bypass graft)  Z95.1 V45.81   2. CAD (coronary artery disease)  I25.10 414.00     Patient Active Problem List   Diagnosis   • OA (osteoarthritis) of knee   • CAD (coronary artery disease)   • CAD (coronary artery disease), native coronary artery     Past Medical History:   Diagnosis Date   • Arthritis    • BPH (benign prostatic hyperplasia)    • Eczema    • Gout    • Hard to intubate     PT'S WIFE SAID DR SANCHEZ SAID PT WAS HARD TO INTUBATE 2017   • High cholesterol    • Hypertension    • Joint pain    • Low back pain      Past Surgical History:   Procedure Laterality Date   • CATARACT EXTRACTION Right    • CORONARY ARTERY BYPASS GRAFT N/A 2022    Procedure: MEL STERNOTOMY CORONARY ARTERY BYPASS GRAFT TIMES 4 USING LEFT INTERNAL MAMMARY ARTERY AND LEFT GREATER SAPHENOUS VEIN GRAFT PER ENDOSCOPIC VEIN HARVESTING AND PRP;  Surgeon: Jr Wayne Ruiz MD;  Location: [facility];  Service: Cardiothoracic;  Laterality: N/A;   • CYSTOSCOPY N/A 2022    Procedure: CYSTOSCOPY WITH SUNG CATHETER PLACEMENT;  Surgeon: Westley Contreras MD;  Location: [facility];  Service: Urology;  Laterality: N/A;   • CYSTOSCOPY TRANSURETHRAL RESECTION OF PROSTATE     • KNEE ARTHROSCOPY Bilateral    • OH TOTAL KNEE ARTHROPLASTY Right 2017    Procedure: RT TOTAL KNEE ARTHROPLASTY;  Surgeon: Arvind Jones MD;  Location: [facility] OR;  Service: Orthopedics   • OH TOTAL KNEE ARTHROPLASTY Left 2017    Procedure: LT TOTAL KNEE ARTHROPLASTY;  Surgeon: Arvind Jones MD;  Location: [facility] OR;  Service: Orthopedics   • TONSILLECTOMY        General Information     Row Name 22 3567          Physical Therapy Time and Intention    Document Type therapy note (daily note)  -AR     Mode of Treatment  physical therapy  -AR     Row Name 09/04/22 1650          General Information    Patient Profile Reviewed yes  -AR     Existing Precautions/Restrictions fall;cardiac;sternal  -AR           User Key  (r) = Recorded By, (t) = Taken By, (c) = Cosigned By    Initials Name Provider Type    AR Claire Maher, PT Physical Therapist               Mobility     Row Name 09/04/22 1651          Bed Mobility    Supine-Sit Long (Bed Mobility) not tested  -AR     Sit-Supine Long (Bed Mobility) minimum assist (75% patient effort);verbal cues  -AR     Comment, (Bed Mobility) HOB flat, increaed time with frequent verbal cues to maximize indepndence  -AR     Row Name 09/04/22 1651          Transfers    Comment, (Transfers) from bed, recliner and toilet  -AR     Row Name 09/04/22 1651          Sit-Stand Transfer    Sit-Stand Long (Transfers) minimum assist (75% patient effort)  -AR     Assistive Device (Sit-Stand Transfers) walker, front-wheeled  -AR     Row Name 09/04/22 1651          Gait/Stairs (Locomotion)    Long Level (Gait) contact guard  -AR     Assistive Device (Gait) walker, front-wheeled  -AR     Distance in Feet (Gait) 120 slow shuffling steps w/ cues for posture; limited by fatigue; no LOB or safety concerns  -AR     Deviations/Abnormal Patterns (Gait) stride length decreased;gait speed decreased  -AR     Bilateral Gait Deviations forward flexed posture  -AR           User Key  (r) = Recorded By, (t) = Taken By, (c) = Cosigned By    Initials Name Provider Type    AR Claire Maher PT Physical Therapist               Obj/Interventions     Row Name 09/04/22 1652          Motor Skills    Therapeutic Exercise --  cardiac ex 10x w/ ed for HEP  -AR     Row Name 09/04/22 1652          Balance    Dynamic Standing Balance minimal assist  -AR     Position/Device Used, Standing Balance walker, rolling  -AR           User Key  (r) = Recorded By, (t) = Taken By, (c) = Cosigned By    Initials Name  Provider Type    Claire Nuno, PT Physical Therapist               Goals/Plan    No documentation.                Clinical Impression     Row Name 09/04/22 1652          Pain    Pretreatment Pain Rating 5/10  -AR     Posttreatment Pain Rating 5/10  -AR     Pain Location incisional  -AR     Pain Location - chest  -AR     Pain Intervention(s) Medication (See MAR);Repositioned  -AR     Row Name 09/04/22 1652          Plan of Care Review    Outcome Evaluation Slowly i mproving independence w/ transfers and activity toleance.  Able to ambulate in butler w/ CGA using RW, slow shuffling steps limited by fatigue; cues for posture.  Sit<>from bed, toilet and recliner few times with min A, verbal cues for UE placement and set up.  Usually has one failed attempt at standing then successful on 2nd attempt.  Sitting rest breaks required throughout PT and required assist for pericare.  Cues for pt to maximize independence when he was asking for assist handing him drink or repositioning blanket.  Currently recommend DC to SNU, discussed DC plan w/ pt, spouse, and dtr.  Recommend continue to ambulate at least 3x/day with staff using walker.  -AR     Row Name 09/04/22 1652          Therapy Assessment/Plan (PT)    Therapy Frequency (PT) daily  -AR     Row Name 09/04/22 1652          Vital Signs    O2 Delivery Pre Treatment room air  -AR           User Key  (r) = Recorded By, (t) = Taken By, (c) = Cosigned By    Initials Name Provider Type    Claire Nuno, PT Physical Therapist               Outcome Measures     Row Name 09/04/22 1656 09/04/22 0825       How much help from another person do you currently need...    Turning from your back to your side while in flat bed without using bedrails? 3  -AR 3  -AG    Moving from lying on back to sitting on the side of a flat bed without bedrails? 3  -AR 3  -AG    Moving to and from a bed to a chair (including a wheelchair)? 3  -AR 3  -AG    Standing up from a chair using your arms  (e.g., wheelchair, bedside chair)? 3  -AR 3  -AG    Climbing 3-5 steps with a railing? 2  -AR 2  -AG    To walk in hospital room? 3  -AR 3  -AG    AM-PAC 6 Clicks Score (PT) 17  -AR 17  -AG    Highest level of mobility 5 --> Static standing  -AR 5 --> Static standing  -AG    Row Name 09/04/22 1656          Functional Assessment    Outcome Measure Options AM-PAC 6 Clicks Basic Mobility (PT)  -AR           User Key  (r) = Recorded By, (t) = Taken By, (c) = Cosigned By    Initials Name Provider Type    AR Claire Maher, PT Physical Therapist    María Elena Nj, RN Registered Nurse                             Physical Therapy Education                 Title: PT OT SLP Therapies (In Progress)     Topic: Physical Therapy (In Progress)     Point: Mobility training (In Progress)     Learning Progress Summary           Patient Acceptance, E, NR by AR at 9/4/2022 1656    Acceptance, E, NR by AR at 9/3/2022 1658    Acceptance, E, VU by EM at 9/2/2022 1340    Acceptance, E, NR,VU by EM at 8/31/2022 1321    Acceptance, E,TB,D, VU,NR by CB at 8/30/2022 1527    Acceptance, E, VU by EM at 8/28/2022 1349    Acceptance, E, VU by EM at 8/27/2022 1444   Family Acceptance, E, NR by AR at 9/4/2022 1656    Acceptance, E, NR by AR at 9/3/2022 1658                   Point: Home exercise program (In Progress)     Learning Progress Summary           Patient Acceptance, E, NR by AR at 9/4/2022 1656    Acceptance, E, NR by AR at 9/3/2022 1658    Acceptance, E,TB,D, VU,NR by CB at 8/30/2022 1527    Acceptance, E, VU by EM at 8/28/2022 1349    Acceptance, E, VU by EM at 8/27/2022 1444   Family Acceptance, E, NR by AR at 9/4/2022 1656    Acceptance, E, NR by AR at 9/3/2022 1658                   Point: Body mechanics (In Progress)     Learning Progress Summary           Patient Acceptance, E, NR by AR at 9/4/2022 1656    Acceptance, E, NR by AR at 9/3/2022 1658    Acceptance, E,TB,D, VU,NR by CB at 8/30/2022 1527   Family Acceptance, E, NR by  AR at 9/4/2022 1656    Acceptance, E, NR by AR at 9/3/2022 1658                   Point: Precautions (In Progress)     Learning Progress Summary           Patient Acceptance, E, NR by AR at 9/4/2022 1656    Acceptance, E, NR by AR at 9/3/2022 1658    Acceptance, E,TB,D, VU,NR by CB at 8/30/2022 1527   Family Acceptance, E, NR by AR at 9/4/2022 1656    Acceptance, E, NR by AR at 9/3/2022 1658                               User Key     Initials Effective Dates Name Provider Type Discipline    EM 06/16/21 -  Sonam Bauer PT Physical Therapist PT    AR 06/16/21 -  Claire Maher PT Physical Therapist PT    CB 10/22/21 -  Barbie Peña, PT Physical Therapist PT              PT Recommendation and Plan     Plan of Care Reviewed With: patient  Outcome Evaluation: Slowly i mproving independence w/ transfers and activity toleance.  Able to ambulate in butler w/ CGA using RW, slow shuffling steps limited by fatigue; cues for posture.  Sit<>from bed, toilet and recliner few times with min A, verbal cues for UE placement and set up.  Usually has one failed attempt at standing then successful on 2nd attempt.  Sitting rest breaks required throughout PT and required assist for pericare.  Cues for pt to maximize independence when he was asking for assist handing him drink or repositioning blanket.  Currently recommend DC to SNU, discussed DC plan w/ pt, spouse, and dtr.  Recommend continue to ambulate at least 3x/day with staff using walker.     Time Calculation:    PT Charges     Row Name 09/04/22 1650             Time Calculation    Start Time 1420  -AR      Stop Time 1515  -AR      Time Calculation (min) 55 min  -AR      PT Received On 09/04/22  -AR      PT - Next Appointment 09/05/22  -AR            User Key  (r) = Recorded By, (t) = Taken By, (c) = Cosigned By    Initials Name Provider Type    AR Claire Maher, PT Physical Therapist              Therapy Charges for Today     Code Description Service Date Service  Provider Modifiers Qty    17753204247 HC PT THER PROC EA 15 MIN 9/3/2022 Claire Maher, PT GP 1    61187322681 HC PT THERAPEUTIC ACT EA 15 MIN 9/3/2022 Claire Maher, PT GP 2    73012489110 HC PT THER PROC EA 15 MIN 9/4/2022 Claire Maher, PT GP 2    53456504998 HC PT THERAPEUTIC ACT EA 15 MIN 9/4/2022 Claire Maher, PT GP 2          PT G-Codes  Outcome Measure Options: AM-PAC 6 Clicks Basic Mobility (PT)  AM-PAC 6 Clicks Score (PT): 17    Claire Maher, PT  9/4/2022

## 2022-09-04 NOTE — PROGRESS NOTES
[facility] Progress Note      Patient Name: Chaitanya Montesinos  : 1945  MRN: [id number]  Primary Care Physician:  Keely Lo APRN  Date of admission: 2022    Subjective     Interval History:   Follow up VIRA  Tolerating diet; no N/V  Having BMs and passing gas  PVR still high at times; external urinary catheter in place    Review of Systems:   As noted above    Objective     Vitals:   Temp:  [97.5 °F (36.4 °C)-98.5 °F (36.9 °C)] 97.8 °F (36.6 °C)  Heart Rate:  [57-69] 61  Resp:  [16-18] 17  BP: (138-158)/(63-78) 158/78    Intake/Output Summary (Last 24 hours) at 2022 1019  Last data filed at 2022 0700  Gross per 24 hour   Intake 360 ml   Output 1377 ml   Net -1017 ml       Physical Exam:    General Appearance: Overweight, chronically ill, fully oriented  Skin: warm and dry  HEENT: oral mucosa moist, AT/NC  Neck: supple, no JVD  Lungs: Coarse breath sounds either flank; not labored  Heart: Irregularly irregular, not tachycardic  Abdomen: Slightly distended, +BS, softer than yesterday, not tender  : no palpable bladder; external urinary catheter  Extremities: +1 edema  Neuro: normal speech and mental status     Scheduled Meds:     aspirin, 81 mg, Oral, Daily  atenolol, 25 mg, Oral, Q12H  chlorhexidine, 15 mL, Mouth/Throat, Q12H  enoxaparin, 40 mg, Subcutaneous, Daily  lidocaine, , Topical, Once  mupirocin, , Each Nare, BID  pantoprazole, 40 mg, Oral, QAM  polyethylene glycol, 17 g, Oral, Daily  simethicone, 80 mg, Oral, 4x Daily      IV Meds:        Results Reviewed:   I have personally reviewed the results from the time of this admission to 2022 10:19 EDT     Results from last 7 days   Lab Units 22  0306 22  0331 22  0316   SODIUM mmol/L 139 140 133*   POTASSIUM mmol/L 3.4* 3.5 3.4*   CHLORIDE mmol/L 108* 109* 102   CO2 mmol/L 22.0 20.0* 17.9*   BUN mg/dL 42* 80* 89*   CREATININE mg/dL 1.02 1.57* 2.17*   CALCIUM mg/dL 8.7 8.4* 9.0   BILIRUBIN  mg/dL  --  0.5 0.6   ALK PHOS U/L  --  65 80   ALT (SGPT) U/L  --  9 9   AST (SGOT) U/L  --  22 16   GLUCOSE mg/dL 116* 110* 145*       Estimated Creatinine Clearance: 88.9 mL/min (by C-G formula based on SCr of 1.02 mg/dL).    Results from last 7 days   Lab Units 09/04/22  0845 09/04/22  0306 09/02/22  0904 09/02/22  0331 09/01/22  0316 08/31/22  0414   MAGNESIUM mg/dL 2.5*  --  3.1* 2.9* 3.0* 2.8*   PHOSPHORUS mg/dL  --  2.0*  --   --  4.4 4.0       Results from last 7 days   Lab Units 08/31/22  0414   URIC ACID mg/dL 7.9*       Results from last 7 days   Lab Units 09/04/22  0306 09/03/22  1133 09/02/22  0331 09/01/22  0316 08/31/22  1125   WBC 10*3/mm3 8.65 11.40* 14.84* 19.05* 19.18*   HEMOGLOBIN g/dL 10.9* 11.4* 11.6* 11.8* 12.7*   PLATELETS 10*3/mm3 318 318 306 327 308             Assessment / Plan     ASSESSMENT:  1.  VIRA, nonoliguric, resolving:  prerenal due to volume sequestration and hypotension; low potassium; improving serum bicarbonate; mild peripheral edema   2.  CABG, 4-vessel, 8/26/2.   3.  Colonic ileus, improving  4.  BPH with prior TURP, dystophic calcification. Terrazas placed by  in OR and removed 8/29. Bladder scans still significant  5.  Afib, tachycardia       PLAN:  1.  Add potassium protocol  2.  Discussed with wife at bedside  3.  Will sign off, but please call with any questions    Danny Hopson MD  09/04/22  10:19 EDT    [facility]  [phone]

## 2022-09-04 NOTE — PROGRESS NOTES
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 Ileus appears to be improving. Positive bowel sounds, but abdomen still distended. Diet increased to full liquids yesterday and patient appears to be tolerating. Patient denies nausea and reports two bowel movements so far and flatus. Repeat KUB pending. Appreciate input from General Surgery. In rate controlled Afib. Will discuss with surgeon starting anticoagulation. Encourage IS and ambulation. Home with Home Health versus Rehab at discharge.

## 2022-09-04 NOTE — PROGRESS NOTES
"General Surgery Progress Note    Summary: Mr. Chaitanya Montesinos is a 77 y.o. year old gentleman with a resolving ileus status post CABG.  Overall continues to improve, KUB continues to look better.  Passing gas and having bowel movements.  Will advance to a regular diet and continue supportive measures.  We will continue to follow.    Chief Complaint: Abdominal distention    Interval Events: No acute events overnight. Having loose stool. Tolerated fulls. Still feels a bit bloated. Getting up to the chair with assistance.     Vitals: /77 (BP Location: Right arm, Patient Position: Lying)   Pulse 57   Temp 98.4 °F (36.9 °C) (Oral)   Resp 16   Ht 195.6 cm (77.01\")   Wt 134 kg (295 lb)   SpO2 93%   BMI 34.97 kg/m²     GENERAL: alert, well appearing, and in no distress  HEENT: normocephalic, atraumatic, moist mucous membranes, clear sclerae   CHEST: no increased work of breathing, symmetric air entry  CARDIAC: regular rate and rhythm    ABDOMEN: Soft, mildly distended, nontender  EXTREMITIES: no cyanosis, clubbing, or edema   SKIN: Warm and moist, no rashes    Labs:  Results from last 7 days   Lab Units 09/04/22  0306 09/03/22  1133 09/02/22  0331   WBC 10*3/mm3 8.65 11.40* 14.84*   HEMOGLOBIN g/dL 10.9* 11.4* 11.6*   HEMATOCRIT % 32.4* 34.7* 33.5*   PLATELETS 10*3/mm3 318 318 306     Results from last 7 days   Lab Units 09/04/22  0306 09/02/22  0331 09/01/22  0316   SODIUM mmol/L 139 140 133*   POTASSIUM mmol/L 3.4* 3.5 3.4*   CHLORIDE mmol/L 108* 109* 102   CO2 mmol/L 22.0 20.0* 17.9*   BUN mg/dL 42* 80* 89*   CREATININE mg/dL 1.02 1.57* 2.17*   CALCIUM mg/dL 8.7 8.4* 9.0   BILIRUBIN mg/dL  --  0.5 0.6   ALK PHOS U/L  --  65 80   ALT (SGPT) U/L  --  9 9   AST (SGOT) U/L  --  22 16   GLUCOSE mg/dL 116* 110* 145*           YARY ARNOLD, MD  General and Endoscopic Surgery  [facility]    [address], [address]  [place], [place], [address]  P: [phone]  F: [phone]     "

## 2022-09-04 NOTE — PLAN OF CARE
Goal Outcome Evaluation:  Plan of Care Reviewed With: patient           Outcome Evaluation: Slowly i mproving independence w/ transfers and activity toleance.  Able to ambulate in butler w/ CGA using RW, slow shuffling steps limited by fatigue; cues for posture.  Sit<>from bed, toilet and recliner few times with min A, verbal cues for UE placement and set up.  Usually has one failed attempt at standing then successful on 2nd attempt.  Sitting rest breaks required throughout PT and required assist for pericare.  Cues for pt to maximize independence when he was asking for assist handing him drink or repositioning blanket.  Currently recommend DC to SNU, discussed DC plan w/ pt, spouse, and dtr.  Recommend continue to ambulate at least 3x/day with staff using walker.

## 2022-09-05 LAB
BASOPHILS # BLD AUTO: 0.07 10*3/MM3 (ref 0–0.2)
BASOPHILS NFR BLD AUTO: 0.7 % (ref 0–1.5)
DEPRECATED RDW RBC AUTO: 47.1 FL (ref 37–54)
EOSINOPHIL # BLD AUTO: 0.62 10*3/MM3 (ref 0–0.4)
EOSINOPHIL NFR BLD AUTO: 6.4 % (ref 0.3–6.2)
ERYTHROCYTE [DISTWIDTH] IN BLOOD BY AUTOMATED COUNT: 13.4 % (ref 12.3–15.4)
GLUCOSE BLDC GLUCOMTR-MCNC: 111 MG/DL (ref 70–130)
GLUCOSE BLDC GLUCOMTR-MCNC: 115 MG/DL (ref 70–130)
GLUCOSE BLDC GLUCOMTR-MCNC: 128 MG/DL (ref 70–130)
GLUCOSE BLDC GLUCOMTR-MCNC: 170 MG/DL (ref 70–130)
HCT VFR BLD AUTO: 33.7 % (ref 37.5–51)
HGB BLD-MCNC: 11.2 G/DL (ref 13–17.7)
IMM GRANULOCYTES # BLD AUTO: 0.47 10*3/MM3 (ref 0–0.05)
IMM GRANULOCYTES NFR BLD AUTO: 4.8 % (ref 0–0.5)
LYMPHOCYTES # BLD AUTO: 1.67 10*3/MM3 (ref 0.7–3.1)
LYMPHOCYTES NFR BLD AUTO: 17.1 % (ref 19.6–45.3)
MCH RBC QN AUTO: 32 PG (ref 26.6–33)
MCHC RBC AUTO-ENTMCNC: 33.2 G/DL (ref 31.5–35.7)
MCV RBC AUTO: 96.3 FL (ref 79–97)
MONOCYTES # BLD AUTO: 1.22 10*3/MM3 (ref 0.1–0.9)
MONOCYTES NFR BLD AUTO: 12.5 % (ref 5–12)
NEUTROPHILS NFR BLD AUTO: 5.7 10*3/MM3 (ref 1.7–7)
NEUTROPHILS NFR BLD AUTO: 58.5 % (ref 42.7–76)
NRBC BLD AUTO-RTO: 0.1 /100 WBC (ref 0–0.2)
PLATELET # BLD AUTO: 303 10*3/MM3 (ref 140–450)
PMV BLD AUTO: 9.9 FL (ref 6–12)
RBC # BLD AUTO: 3.5 10*6/MM3 (ref 4.14–5.8)
WBC NRBC COR # BLD: 9.75 10*3/MM3 (ref 3.4–10.8)

## 2022-09-05 PROCEDURE — 97530 THERAPEUTIC ACTIVITIES: CPT

## 2022-09-05 PROCEDURE — 99024 POSTOP FOLLOW-UP VISIT: CPT | Performed by: NURSE PRACTITIONER

## 2022-09-05 PROCEDURE — 85025 COMPLETE CBC W/AUTO DIFF WBC: CPT | Performed by: PHYSICIAN ASSISTANT

## 2022-09-05 PROCEDURE — 97110 THERAPEUTIC EXERCISES: CPT

## 2022-09-05 PROCEDURE — 94799 UNLISTED PULMONARY SVC/PX: CPT

## 2022-09-05 PROCEDURE — 82962 GLUCOSE BLOOD TEST: CPT

## 2022-09-05 RX ORDER — FUROSEMIDE 20 MG/1
20 TABLET ORAL DAILY
Status: DISCONTINUED | OUTPATIENT
Start: 2022-09-05 | End: 2022-09-06

## 2022-09-05 RX ORDER — ATORVASTATIN CALCIUM 20 MG/1
10 TABLET, FILM COATED ORAL NIGHTLY
Status: DISCONTINUED | OUTPATIENT
Start: 2022-09-05 | End: 2022-09-09 | Stop reason: HOSPADM

## 2022-09-05 RX ORDER — POTASSIUM CHLORIDE 750 MG/1
10 TABLET, FILM COATED, EXTENDED RELEASE ORAL DAILY
Status: DISCONTINUED | OUTPATIENT
Start: 2022-09-05 | End: 2022-09-06

## 2022-09-05 RX ADMIN — SIMETHICONE 80 MG: 80 TABLET, CHEWABLE ORAL at 20:19

## 2022-09-05 RX ADMIN — CHLORHEXIDINE GLUCONATE 15 ML: 1.2 SOLUTION ORAL at 20:19

## 2022-09-05 RX ADMIN — MUPIROCIN 1 APPLICATION: 20 OINTMENT TOPICAL at 08:52

## 2022-09-05 RX ADMIN — ATORVASTATIN CALCIUM 10 MG: 20 TABLET, FILM COATED ORAL at 20:25

## 2022-09-05 RX ADMIN — ASPIRIN 81 MG: 81 TABLET, COATED ORAL at 08:52

## 2022-09-05 RX ADMIN — SIMETHICONE 80 MG: 80 TABLET, CHEWABLE ORAL at 12:28

## 2022-09-05 RX ADMIN — APIXABAN 5 MG: 5 TABLET, FILM COATED ORAL at 20:19

## 2022-09-05 RX ADMIN — ATENOLOL 25 MG: 25 TABLET ORAL at 20:19

## 2022-09-05 RX ADMIN — CHLORHEXIDINE GLUCONATE 15 ML: 1.2 SOLUTION ORAL at 08:52

## 2022-09-05 RX ADMIN — PANTOPRAZOLE SODIUM 40 MG: 40 TABLET, DELAYED RELEASE ORAL at 06:40

## 2022-09-05 RX ADMIN — SIMETHICONE 80 MG: 80 TABLET, CHEWABLE ORAL at 18:27

## 2022-09-05 RX ADMIN — FUROSEMIDE 20 MG: 20 TABLET ORAL at 16:13

## 2022-09-05 RX ADMIN — POTASSIUM CHLORIDE 10 MEQ: 750 TABLET, EXTENDED RELEASE ORAL at 16:13

## 2022-09-05 RX ADMIN — POLYETHYLENE GLYCOL 3350 17 G: 17 POWDER, FOR SOLUTION ORAL at 08:52

## 2022-09-05 RX ADMIN — SIMETHICONE 80 MG: 80 TABLET, CHEWABLE ORAL at 08:52

## 2022-09-05 RX ADMIN — ATENOLOL 25 MG: 25 TABLET ORAL at 12:28

## 2022-09-05 RX ADMIN — MUPIROCIN 1 APPLICATION: 20 OINTMENT TOPICAL at 20:19

## 2022-09-05 NOTE — PROGRESS NOTES
Doing fine.  Tolerating diet and having bowel function.  No significant abdominal distention.  Yesterday's plain film looked basically normal.  Will sign off, available again if needed.

## 2022-09-05 NOTE — PROGRESS NOTES
" LOS: 11 days   Patient Care Team:  Keely Lo APRN as PCP - General (Family Medicine)  Jeffrey Inman MD as Consulting Physician (Cardiology)    Chief Complaint: post op fu    Subjective:  Symptoms:  No shortness of breath or chest pain.    Diet:  No nausea.    Activity level: Returning to normal.    Pain:  He reports no pain.          Vital Signs  Temp:  [97.6 °F (36.4 °C)-98.8 °F (37.1 °C)] 98.8 °F (37.1 °C)  Heart Rate:  [56-65] 65  Resp:  [17-18] 18  BP: (130-152)/(68-84) 146/80  Body mass index is 35.24 kg/m².    Intake/Output Summary (Last 24 hours) at 9/5/2022 0945  Last data filed at 9/5/2022 0850  Gross per 24 hour   Intake 480 ml   Output 726 ml   Net -246 ml     I/O this shift:  In: 240 [P.O.:240]  Out: -           09/03/22  0600 09/04/22  0500 09/05/22  0445   Weight: 134 kg (296 lb 6.4 oz) 134 kg (295 lb) 135 kg (297 lb 3.2 oz)         Objective:  General Appearance:  Comfortable.    Vital signs: (most recent): Blood pressure 172/59, pulse 72, temperature 98.3 °F (36.8 °C), temperature source Oral, resp. rate 18, height 195.6 cm (77.01\"), weight 135 kg (297 lb 3.2 oz), SpO2 95 %.    Output: Producing urine and producing stool.    Lungs:  Normal effort and normal respiratory rate.  (Room air)  Heart: Normal rate.  Regular rhythm.  S1 normal and S2 normal.    Extremities: There is dependent edema (1+ pretibial).    Neurological: Patient is alert and oriented to person, place and time.    Skin:  Warm and dry.  (Stable sternum)            Results Review:        WBC WBC   Date Value Ref Range Status   09/05/2022 9.75 3.40 - 10.80 10*3/mm3 Final   09/04/2022 8.65 3.40 - 10.80 10*3/mm3 Final   09/03/2022 11.40 (H) 3.40 - 10.80 10*3/mm3 Final      HGB Hemoglobin   Date Value Ref Range Status   09/05/2022 11.2 (L) 13.0 - 17.7 g/dL Final   09/04/2022 10.9 (L) 13.0 - 17.7 g/dL Final   09/03/2022 11.4 (L) 13.0 - 17.7 g/dL Final      HCT Hematocrit   Date Value Ref Range Status   09/05/2022 33.7 (L) 37.5 - " 51.0 % Final   09/04/2022 32.4 (L) 37.5 - 51.0 % Final   09/03/2022 34.7 (L) 37.5 - 51.0 % Final      Platelets Platelets   Date Value Ref Range Status   09/05/2022 303 140 - 450 10*3/mm3 Final   09/04/2022 318 140 - 450 10*3/mm3 Final   09/03/2022 318 140 - 450 10*3/mm3 Final        PT/INR:  No results found for: PROTIME/No results found for: INR    Sodium Sodium   Date Value Ref Range Status   09/04/2022 139 136 - 145 mmol/L Final      Potassium Potassium   Date Value Ref Range Status   09/04/2022 4.2 3.5 - 5.2 mmol/L Final   09/04/2022 3.4 (L) 3.5 - 5.2 mmol/L Final      Chloride Chloride   Date Value Ref Range Status   09/04/2022 108 (H) 98 - 107 mmol/L Final      Bicarbonate CO2   Date Value Ref Range Status   09/04/2022 22.0 22.0 - 29.0 mmol/L Final      BUN BUN   Date Value Ref Range Status   09/04/2022 42 (H) 8 - 23 mg/dL Final      Creatinine Creatinine   Date Value Ref Range Status   09/04/2022 1.02 0.76 - 1.27 mg/dL Final      Calcium Calcium   Date Value Ref Range Status   09/04/2022 8.7 8.6 - 10.5 mg/dL Final      Magnesium Magnesium   Date Value Ref Range Status   09/04/2022 2.5 (H) 1.6 - 2.4 mg/dL Final      Troponin No results found for: TROPONIN   BNP No results found for: BNP         aspirin, 81 mg, Oral, Daily  atenolol, 25 mg, Oral, Q12H  chlorhexidine, 15 mL, Mouth/Throat, Q12H  enoxaparin, 40 mg, Subcutaneous, Daily  lidocaine, , Topical, Once  mupirocin, , Each Nare, BID  pantoprazole, 40 mg, Oral, QAM  polyethylene glycol, 17 g, Oral, Daily  simethicone, 80 mg, Oral, 4x Daily           PRN Meds:.•  acetaminophen  •  acetaminophen **OR** acetaminophen **OR** acetaminophen  •  ALPRAZolam  •  atropine  •  bisacodyl  •  bisacodyl  •  cyclobenzaprine  •  dextrose  •  dextrose  •  fluticasone  •  glucagon (human recombinant)  •  HYDROcodone-acetaminophen  •  ipratropium-albuterol  •  nitroglycerin  •  ondansetron  •  oxyCODONE  •  polyethylene glycol  •  potassium chloride  •  potassium chloride  •   potassium chloride  •  potassium chloride  •  potassium chloride    Patient Active Problem List   Diagnosis Code   • OA (osteoarthritis) of knee M17.10   • CAD (coronary artery disease) I25.10   • CAD (coronary artery disease), native coronary artery I25.10       Assessment & Plan    -Severe multivessel CAD- s/p CABGx4 LIMA/LSVG- [place]- POD#10  -VIRA on CKD stage II, BL creatinine 1.2-1.4----improved fxn, renal signed off  -Gout  -Hypertension  -Leukocytosis- probable reactive  -Regrowth of prostate with dystrophic calcifications--cystoscopy in the OR  - urinary retention  -post op atrial fibrillation-will need AC whenever appropriate  -Colonic ileus-general surgery following    Abdomen improved, resume lipitor, check cmp in am.  Start low dose lasix with lower extremity edema.  Still in and out of AF, start Eliquis tonight. Check post void residual.  Still with some weakness, eval for SNF pending, may improved for home with home health, will monitor PT notes.     ROSEY Cooper  09/05/22  09:45 EDT

## 2022-09-05 NOTE — PLAN OF CARE
Goal Outcome Evaluation:  Plan of Care Reviewed With: patient           Outcome Evaluation: Improved independence w/ gait during PT today.  Able to ambulate 120' w/ contact improving to stand by assist using RW.  Still with slow shuffling steps requiring cues for posture.  Practiced sit<>stand several times from bed, toilet, and recliner.  Verbal cues for set up, usually first attempt is unsuccessful but able to stand 2nd attempt w/ CGA to min A.  Supine>sit with stand by assist HOB flat, no bed rail, but required maximal verbal cues and increased time.   Currently recommend DC to SNU, but continued conversation w/ pt and spouse about potential for DC home with home PT and assist from other family members.  Pt had not walked in  butler yet today, reviewed walking recommendations to ambulate in butler 3x/day.  Discussed pt performance and assist level w/ RN.

## 2022-09-05 NOTE — THERAPY TREATMENT NOTE
Patient Name: Chaitanya Montesinos  : 1945    MRN: [id number]                              Today's Date: 2022       Admit Date: 2022    Visit Dx:     ICD-10-CM ICD-9-CM   1. S/P CABG (coronary artery bypass graft)  Z95.1 V45.81   2. CAD (coronary artery disease)  I25.10 414.00     Patient Active Problem List   Diagnosis   • OA (osteoarthritis) of knee   • CAD (coronary artery disease)   • CAD (coronary artery disease), native coronary artery     Past Medical History:   Diagnosis Date   • Arthritis    • BPH (benign prostatic hyperplasia)    • Eczema    • Gout    • Hard to intubate     PT'S WIFE SAID DR SANCHEZ SAID PT WAS HARD TO INTUBATE 2017   • High cholesterol    • Hypertension    • Joint pain    • Low back pain      Past Surgical History:   Procedure Laterality Date   • CATARACT EXTRACTION Right    • CORONARY ARTERY BYPASS GRAFT N/A 2022    Procedure: MEL STERNOTOMY CORONARY ARTERY BYPASS GRAFT TIMES 4 USING LEFT INTERNAL MAMMARY ARTERY AND LEFT GREATER SAPHENOUS VEIN GRAFT PER ENDOSCOPIC VEIN HARVESTING AND PRP;  Surgeon: Jr Wayne Ruiz MD;  Location: [facility];  Service: Cardiothoracic;  Laterality: N/A;   • CYSTOSCOPY N/A 2022    Procedure: CYSTOSCOPY WITH SUNG CATHETER PLACEMENT;  Surgeon: Westley Contreras MD;  Location: [facility];  Service: Urology;  Laterality: N/A;   • CYSTOSCOPY TRANSURETHRAL RESECTION OF PROSTATE     • KNEE ARTHROSCOPY Bilateral    • TX TOTAL KNEE ARTHROPLASTY Right 2017    Procedure: RT TOTAL KNEE ARTHROPLASTY;  Surgeon: Arvind Jones MD;  Location: [facility];  Service: Orthopedics   • TX TOTAL KNEE ARTHROPLASTY Left 2017    Procedure: LT TOTAL KNEE ARTHROPLASTY;  Surgeon: Arvind Jones MD;  Location: [facility] OR;  Service: Orthopedics   • TONSILLECTOMY        General Information     Row Name 22 1716          Physical Therapy Time and Intention    Document Type therapy note (daily note)  -AR     Mode of Treatment  physical therapy  -AR     Row Name 09/05/22 1716          General Information    Patient Profile Reviewed yes  -AR     Existing Precautions/Restrictions fall;cardiac;sternal  -AR     Row Name 09/05/22 1716          Cognition    Orientation Status (Cognition) oriented x 3  -AR     Row Name 09/05/22 1716          Safety Issues, Functional Mobility    Impairments Affecting Function (Mobility) balance;endurance/activity tolerance;strength;pain  -AR           User Key  (r) = Recorded By, (t) = Taken By, (c) = Cosigned By    Initials Name Provider Type    AR Claire Maher, PT Physical Therapist               Mobility     Row Name 09/05/22 1717          Bed Mobility    Supine-Sit Greer (Bed Mobility) verbal cues;standby assist  -AR     Sit-Supine [place] (Bed Mobility) not tested  -AR     Comment, (Bed Mobility) HOB flat, no bed rail, increased tiem and verbal cues to maximize independence ; w/ education  -AR     Row Name 09/05/22 1717          Transfers    Comment, (Transfers) from bed, recliner and toilet  -AR     Row Name 09/05/22 1717          Sit-Stand Transfer    Sit-Stand Greer (Transfers) contact guard;minimum assist (75% patient effort)  -AR     Assistive Device (Sit-Stand Transfers) walker, front-wheeled  -AR     Comment, (Sit-Stand Transfer) usually first attempt standing pt fails and repositions legs better then successful standing up 2nd attempt w/ min A to CGA  -AR     Row Name 09/05/22 1717          Gait/Stairs (Locomotion)    Greer Level (Gait) contact guard;standby assist  -AR     Assistive Device (Gait) walker, front-wheeled  -AR     Distance in Feet (Gait) 120' slow shuffling steps w/ cues for posture; limited by fatigue.  CGA improving to SBA today.  -AR     Deviations/Abnormal Patterns (Gait) stride length decreased;gait speed decreased  -AR     Bilateral Gait Deviations forward flexed posture  -AR           User Key  (r) = Recorded By, (t) = Taken By, (c) = Cosigned By     Initials Name Provider Type    AR Claire Maher, PT Physical Therapist               Obj/Interventions     Row Name 09/05/22 1718          Motor Skills    Therapeutic Exercise --  cardiac ex 5x and standing mini squats 10x  -AR     Row Name 09/05/22 1718          Balance    Dynamic Standing Balance minimal assist  -AR     Position/Device Used, Standing Balance walker, rolling  -AR           User Key  (r) = Recorded By, (t) = Taken By, (c) = Cosigned By    Initials Name Provider Type    AR Claire Maher, PT Physical Therapist               Goals/Plan    No documentation.                Clinical Impression     Row Name 09/05/22 1719          Pain    Pretreatment Pain Rating 4/10  -AR     Posttreatment Pain Rating 4/10  -AR     Pain Location incisional  -AR     Pain Location - chest  -AR     Pain Intervention(s) Medication (See MAR);Repositioned  -AR     Row Name 09/05/22 1719          Plan of Care Review    Outcome Evaluation Improved independence w/ gait during PT today.  Able to ambulate 120' w/ contact improving to stand by assist using RW.  Still with slow shuffling steps requiring cues for posture.  Practiced sit<>stand several times from bed, toilet, and recliner.  Verbal cues for set up, usually first attempt is unsuccessful but able to stand 2nd attempt w/ CGA to min A.  Supine>sit with stand by assist HOB flat, no bed rail, but required maximal verbal cues and increased time.   Currently recommend DC to SNU, but continued conversation w/ pt and spouse about potential for DC home with home PT and assist from other family members.  Pt had not walked in  butler yet today, reviewed walking recommendations to ambulate in butler 3x/day.  Discussed pt performance and assist level w/ RN.  -AR     Row Name 09/05/22 1719          Therapy Assessment/Plan (PT)    Rehab Potential (PT) good, to achieve stated therapy goals  -AR     Criteria for Skilled Interventions Met (PT) yes  -AR     Therapy Frequency (PT) daily  -AR      Row Name 09/05/22 1719          Vital Signs    O2 Delivery Pre Treatment room air  -AR     Row Name 09/05/22 1719          Positioning and Restraints    Pre-Treatment Position in bed  -AR     Post Treatment Position chair  -AR     In Chair notified nsg;reclined;sitting;call light within reach;encouraged to call for assist;with family/caregiver  -AR           User Key  (r) = Recorded By, (t) = Taken By, (c) = Cosigned By    Initials Name Provider Type    AR Claire Maher, PT Physical Therapist               Outcome Measures     Row Name 09/05/22 1724 09/05/22 0852       How much help from another person do you currently need...    Turning from your back to your side while in flat bed without using bedrails? 3  -AR 3  -CB    Moving from lying on back to sitting on the side of a flat bed without bedrails? 3  -AR 3  -CB    Moving to and from a bed to a chair (including a wheelchair)? 3  -AR 3  -CB    Standing up from a chair using your arms (e.g., wheelchair, bedside chair)? 3  -AR 3  -CB    Climbing 3-5 steps with a railing? 3  -AR 3  -CB    To walk in hospital room? 3  -AR 3  -CB    AM-PAC 6 Clicks Score (PT) 18  -AR 18  -CB    Highest level of mobility 6 --> Walked 10 steps or more  -AR 6 --> Walked 10 steps or more  -CB    Row Name 09/05/22 1724          Functional Assessment    Outcome Measure Options AM-PAC 6 Clicks Basic Mobility (PT)  -AR           User Key  (r) = Recorded By, (t) = Taken By, (c) = Cosigned By    Initials Name Provider Type    Claire Nuno, PT Physical Therapist    Nydia Mercer, RN Registered Nurse                             Physical Therapy Education                 Title: PT OT SLP Therapies (In Progress)     Topic: Physical Therapy (In Progress)     Point: Mobility training (In Progress)     Learning Progress Summary           Patient Acceptance, E, NR by AR at 9/5/2022 1724    Acceptance, E, NR by AR at 9/4/2022 1656    Acceptance, E, NR by AR at 9/3/2022 1658     Acceptance, E, VU by EM at 9/2/2022 1340    Acceptance, E, NR,VU by EM at 8/31/2022 1321    Acceptance, E,TB,D, VU,NR by CB at 8/30/2022 1527    Acceptance, E, VU by EM at 8/28/2022 1349    Acceptance, E, VU by EM at 8/27/2022 1444   Family Acceptance, E, NR by AR at 9/4/2022 1656    Acceptance, E, NR by AR at 9/3/2022 1658                   Point: Home exercise program (In Progress)     Learning Progress Summary           Patient Acceptance, E, NR by AR at 9/5/2022 1724    Acceptance, E, NR by AR at 9/4/2022 1656    Acceptance, E, NR by AR at 9/3/2022 1658    Acceptance, E,TB,D, VU,NR by CB at 8/30/2022 1527    Acceptance, E, VU by EM at 8/28/2022 1349    Acceptance, E, VU by EM at 8/27/2022 1444   Family Acceptance, E, NR by AR at 9/4/2022 1656    Acceptance, E, NR by AR at 9/3/2022 1658                   Point: Body mechanics (In Progress)     Learning Progress Summary           Patient Acceptance, E, NR by AR at 9/5/2022 1724    Acceptance, E, NR by AR at 9/4/2022 1656    Acceptance, E, NR by AR at 9/3/2022 1658    Acceptance, E,TB,D, VU,NR by CB at 8/30/2022 1527   Family Acceptance, E, NR by AR at 9/4/2022 1656    Acceptance, E, NR by AR at 9/3/2022 1658                   Point: Precautions (In Progress)     Learning Progress Summary           Patient Acceptance, E, NR by AR at 9/5/2022 1724    Acceptance, E, NR by AR at 9/4/2022 1656    Acceptance, E, NR by AR at 9/3/2022 1658    Acceptance, E,TB,D, VU,NR by CB at 8/30/2022 1527   Family Acceptance, E, NR by AR at 9/4/2022 1656    Acceptance, E, NR by AR at 9/3/2022 1658                               User Key     Initials Effective Dates Name Provider Type Discipline    EM 06/16/21 -  Sonam Bauer, PT Physical Therapist PT    AR 06/16/21 -  Claire Maher PT Physical Therapist PT    CB 10/22/21 -  Barbie Peña PT Physical Therapist PT              PT Recommendation and Plan     Plan of Care Reviewed With: patient  Outcome Evaluation: Improved  independence w/ gait during PT today.  Able to ambulate 120' w/ contact improving to stand by assist using RW.  Still with slow shuffling steps requiring cues for posture.  Practiced sit<>stand several times from bed, toilet, and recliner.  Verbal cues for set up, usually first attempt is unsuccessful but able to stand 2nd attempt w/ CGA to min A.  Supine>sit with stand by assist HOB flat, no bed rail, but required maximal verbal cues and increased time.   Currently recommend DC to SNU, but continued conversation w/ pt and spouse about potential for DC home with home PT and assist from other family members.  Pt had not walked in  butler yet today, reviewed walking recommendations to ambulate in butler 3x/day.  Discussed pt performance and assist level w/ RN.     Time Calculation:    PT Charges     Row Name 09/05/22 1716             Time Calculation    Start Time 1410  -AR      Stop Time 1435  -AR      Time Calculation (min) 25 min  -AR      PT Received On 09/05/22  -AR      PT - Next Appointment 09/06/22  -AR            User Key  (r) = Recorded By, (t) = Taken By, (c) = Cosigned By    Initials Name Provider Type    AR Claire Maher, PT Physical Therapist              Therapy Charges for Today     Code Description Service Date Service Provider Modifiers Qty    67390484465 HC PT THER PROC EA 15 MIN 9/4/2022 Claire Maher, PT GP 2    62148638414 HC PT THERAPEUTIC ACT EA 15 MIN 9/4/2022 Claire Maher, PT GP 2    24532447667 HC PT THER PROC EA 15 MIN 9/5/2022 Claire Maher, PT GP 1    24717785187 HC PT THERAPEUTIC ACT EA 15 MIN 9/5/2022 Claire Maher, PT GP 1    64340343209 HC PT THER SUPP EA 15 MIN 9/5/2022 Claire Maher, PT GP 1          PT G-Codes  Outcome Measure Options: AM-PAC 6 Clicks Basic Mobility (PT)  AM-PAC 6 Clicks Score (PT): 18    Claire Maher PT  9/5/2022

## 2022-09-06 LAB
ALBUMIN SERPL-MCNC: 2.8 G/DL (ref 3.5–5.2)
ALBUMIN/GLOB SERPL: 1 G/DL
ALP SERPL-CCNC: 59 U/L (ref 39–117)
ALT SERPL W P-5'-P-CCNC: 33 U/L (ref 1–41)
ANION GAP SERPL CALCULATED.3IONS-SCNC: 11.3 MMOL/L (ref 5–15)
AST SERPL-CCNC: 38 U/L (ref 1–40)
BASOPHILS # BLD AUTO: 0.1 10*3/MM3 (ref 0–0.2)
BASOPHILS NFR BLD AUTO: 0.9 % (ref 0–1.5)
BILIRUB SERPL-MCNC: 0.3 MG/DL (ref 0–1.2)
BUN SERPL-MCNC: 19 MG/DL (ref 8–23)
BUN/CREAT SERPL: 18.4 (ref 7–25)
CALCIUM SPEC-SCNC: 8.1 MG/DL (ref 8.6–10.5)
CHLORIDE SERPL-SCNC: 110 MMOL/L (ref 98–107)
CO2 SERPL-SCNC: 19.7 MMOL/L (ref 22–29)
CREAT SERPL-MCNC: 1.03 MG/DL (ref 0.76–1.27)
DEPRECATED RDW RBC AUTO: 45.3 FL (ref 37–54)
EGFRCR SERPLBLD CKD-EPI 2021: 73.9 ML/MIN/1.73
EOSINOPHIL # BLD AUTO: 0.59 10*3/MM3 (ref 0–0.4)
EOSINOPHIL NFR BLD AUTO: 5.6 % (ref 0.3–6.2)
ERYTHROCYTE [DISTWIDTH] IN BLOOD BY AUTOMATED COUNT: 13 % (ref 12.3–15.4)
GLOBULIN UR ELPH-MCNC: 2.7 GM/DL
GLUCOSE BLDC GLUCOMTR-MCNC: 107 MG/DL (ref 70–130)
GLUCOSE BLDC GLUCOMTR-MCNC: 132 MG/DL (ref 70–130)
GLUCOSE BLDC GLUCOMTR-MCNC: 98 MG/DL (ref 70–130)
GLUCOSE SERPL-MCNC: 112 MG/DL (ref 65–99)
HCT VFR BLD AUTO: 33.8 % (ref 37.5–51)
HGB BLD-MCNC: 11.3 G/DL (ref 13–17.7)
IMM GRANULOCYTES # BLD AUTO: 0.42 10*3/MM3 (ref 0–0.05)
IMM GRANULOCYTES NFR BLD AUTO: 4 % (ref 0–0.5)
LYMPHOCYTES # BLD AUTO: 1.72 10*3/MM3 (ref 0.7–3.1)
LYMPHOCYTES NFR BLD AUTO: 16.2 % (ref 19.6–45.3)
MCH RBC QN AUTO: 32.4 PG (ref 26.6–33)
MCHC RBC AUTO-ENTMCNC: 33.4 G/DL (ref 31.5–35.7)
MCV RBC AUTO: 96.8 FL (ref 79–97)
MONOCYTES # BLD AUTO: 1.25 10*3/MM3 (ref 0.1–0.9)
MONOCYTES NFR BLD AUTO: 11.8 % (ref 5–12)
NEUTROPHILS NFR BLD AUTO: 6.51 10*3/MM3 (ref 1.7–7)
NEUTROPHILS NFR BLD AUTO: 61.5 % (ref 42.7–76)
NRBC BLD AUTO-RTO: 0 /100 WBC (ref 0–0.2)
PLATELET # BLD AUTO: 283 10*3/MM3 (ref 140–450)
PMV BLD AUTO: 9.4 FL (ref 6–12)
POTASSIUM SERPL-SCNC: 3.8 MMOL/L (ref 3.5–5.2)
PROT SERPL-MCNC: 5.5 G/DL (ref 6–8.5)
RBC # BLD AUTO: 3.49 10*6/MM3 (ref 4.14–5.8)
SODIUM SERPL-SCNC: 141 MMOL/L (ref 136–145)
WBC NRBC COR # BLD: 10.59 10*3/MM3 (ref 3.4–10.8)

## 2022-09-06 PROCEDURE — 94799 UNLISTED PULMONARY SVC/PX: CPT

## 2022-09-06 PROCEDURE — 82962 GLUCOSE BLOOD TEST: CPT

## 2022-09-06 PROCEDURE — 97530 THERAPEUTIC ACTIVITIES: CPT

## 2022-09-06 PROCEDURE — 80053 COMPREHEN METABOLIC PANEL: CPT | Performed by: NURSE PRACTITIONER

## 2022-09-06 PROCEDURE — 97116 GAIT TRAINING THERAPY: CPT

## 2022-09-06 PROCEDURE — 85025 COMPLETE CBC W/AUTO DIFF WBC: CPT | Performed by: PHYSICIAN ASSISTANT

## 2022-09-06 RX ORDER — POTASSIUM CHLORIDE 750 MG/1
20 TABLET, FILM COATED, EXTENDED RELEASE ORAL DAILY
Status: DISCONTINUED | OUTPATIENT
Start: 2022-09-07 | End: 2022-09-09 | Stop reason: HOSPADM

## 2022-09-06 RX ORDER — LOSARTAN POTASSIUM 25 MG/1
25 TABLET ORAL
Status: DISCONTINUED | OUTPATIENT
Start: 2022-09-06 | End: 2022-09-07

## 2022-09-06 RX ORDER — FUROSEMIDE 40 MG/1
40 TABLET ORAL DAILY
Status: DISCONTINUED | OUTPATIENT
Start: 2022-09-07 | End: 2022-09-07

## 2022-09-06 RX ORDER — TAMSULOSIN HYDROCHLORIDE 0.4 MG/1
0.4 CAPSULE ORAL DAILY
Status: DISCONTINUED | OUTPATIENT
Start: 2022-09-06 | End: 2022-09-09 | Stop reason: HOSPADM

## 2022-09-06 RX ORDER — MONTELUKAST SODIUM 10 MG/1
10 TABLET ORAL NIGHTLY
Status: DISCONTINUED | OUTPATIENT
Start: 2022-09-06 | End: 2022-09-09 | Stop reason: HOSPADM

## 2022-09-06 RX ADMIN — SIMETHICONE 80 MG: 80 TABLET, CHEWABLE ORAL at 11:18

## 2022-09-06 RX ADMIN — MONTELUKAST SODIUM 10 MG: 10 TABLET, FILM COATED ORAL at 20:19

## 2022-09-06 RX ADMIN — MUPIROCIN 1 APPLICATION: 20 OINTMENT TOPICAL at 20:19

## 2022-09-06 RX ADMIN — ATENOLOL 25 MG: 25 TABLET ORAL at 08:06

## 2022-09-06 RX ADMIN — SIMETHICONE 80 MG: 80 TABLET, CHEWABLE ORAL at 18:34

## 2022-09-06 RX ADMIN — APIXABAN 5 MG: 5 TABLET, FILM COATED ORAL at 20:19

## 2022-09-06 RX ADMIN — POTASSIUM CHLORIDE 10 MEQ: 750 TABLET, EXTENDED RELEASE ORAL at 08:06

## 2022-09-06 RX ADMIN — APIXABAN 5 MG: 5 TABLET, FILM COATED ORAL at 08:06

## 2022-09-06 RX ADMIN — ASPIRIN 81 MG: 81 TABLET, COATED ORAL at 08:06

## 2022-09-06 RX ADMIN — PANTOPRAZOLE SODIUM 40 MG: 40 TABLET, DELAYED RELEASE ORAL at 07:20

## 2022-09-06 RX ADMIN — FUROSEMIDE 20 MG: 20 TABLET ORAL at 08:06

## 2022-09-06 RX ADMIN — SIMETHICONE 80 MG: 80 TABLET, CHEWABLE ORAL at 08:06

## 2022-09-06 RX ADMIN — TAMSULOSIN HYDROCHLORIDE 0.4 MG: 0.4 CAPSULE ORAL at 11:18

## 2022-09-06 RX ADMIN — CHLORHEXIDINE GLUCONATE 15 ML: 1.2 SOLUTION ORAL at 20:19

## 2022-09-06 RX ADMIN — SIMETHICONE 80 MG: 80 TABLET, CHEWABLE ORAL at 20:19

## 2022-09-06 RX ADMIN — MUPIROCIN 1 APPLICATION: 20 OINTMENT TOPICAL at 08:06

## 2022-09-06 RX ADMIN — ATENOLOL 25 MG: 25 TABLET ORAL at 20:19

## 2022-09-06 RX ADMIN — LOSARTAN POTASSIUM 25 MG: 25 TABLET, FILM COATED ORAL at 14:33

## 2022-09-06 RX ADMIN — ATORVASTATIN CALCIUM 10 MG: 20 TABLET, FILM COATED ORAL at 20:19

## 2022-09-06 RX ADMIN — CHLORHEXIDINE GLUCONATE 15 ML: 1.2 SOLUTION ORAL at 08:06

## 2022-09-06 NOTE — DISCHARGE PLACEMENT REQUEST
"Chaitanya Montesinos (77 y.o. Male)             Date of Birth   1945    Social Security Number       Address   [address] [place] [address]    Home Phone   [phone]    MRN   [id number]       [employer]   Congregational    Marital Status                               Admission Date   8/25/22    Admission Type   Urgent    Admitting Provider   Jr Wayne Ruiz MD    Attending Provider   Jr Wayne Ruiz MD    Department, Room/Bed   [facility] CARDIOVASC UNIT, 2224/1       Discharge Date       Discharge Disposition       Discharge Destination                               Attending Provider: Jr Wayne Ruiz MD    Allergies: No Known Allergies    Isolation: None   Infection: None   Code Status: CPR   Advance Care Planning Activity    Ht: 195.6 cm (77.01\")   Wt: 135 kg (296 lb 14.4 oz)    Admission Cmt: None   Principal Problem: CAD (coronary artery disease) [I25.10] More...                 Active Insurance as of 8/25/2022     Primary Coverage     Payor Plan Insurance Group Employer/Plan Group    HUMANA MEDICARE REPLACEMENT HUMANA MEDICARE REPLACEMENT [id number]     Payor Plan Address Payor Plan Phone Number Payor Plan Fax Number Effective Dates    [address] [address] [phone]  1/1/2012 - None Entered    [facility] [phone]       Subscriber Name Subscriber Birth Date Member ID       CHAITANYA MONTESINOS 1945 [id number]                 Emergency Contacts      (Rel.) Home Phone Work Phone Mobile Phone    Sunni Montesinos (Spouse) [phone] -- [phone]    Cely Robertson (Sister) [phone] -- --              "

## 2022-09-06 NOTE — PROGRESS NOTES
LOS: 12 days   Patient Care Team:  Keely Lo APRN as PCP - General (Family Medicine)  Jeffrey Inman MD as Consulting Physician (Cardiology)    Chief Complaint: post op  Subjective      Vital Signs  Temp:  [97.3 °F (36.3 °C)-98.8 °F (37.1 °C)] 97.3 °F (36.3 °C)  Heart Rate:  [55-72] 65  Resp:  [18-20] 20  BP: (124-172)/(59-87) 156/74  Body mass index is 35.2 kg/m².    Intake/Output Summary (Last 24 hours) at 9/6/2022 0739  Last data filed at 9/6/2022 0500  Gross per 24 hour   Intake 1080 ml   Output 1537 ml   Net -457 ml     No intake/output data recorded.    Chest tube drainage last 8 hours         09/04/22  0500 09/05/22  0445 09/06/22  0600   Weight: 134 kg (295 lb) 135 kg (297 lb 3.2 oz) 135 kg (296 lb 14.4 oz)         Objective    Results Review:        WBC WBC   Date Value Ref Range Status   09/06/2022 10.59 3.40 - 10.80 10*3/mm3 Final   09/05/2022 9.75 3.40 - 10.80 10*3/mm3 Final   09/04/2022 8.65 3.40 - 10.80 10*3/mm3 Final   09/03/2022 11.40 (H) 3.40 - 10.80 10*3/mm3 Final      HGB Hemoglobin   Date Value Ref Range Status   09/06/2022 11.3 (L) 13.0 - 17.7 g/dL Final   09/05/2022 11.2 (L) 13.0 - 17.7 g/dL Final   09/04/2022 10.9 (L) 13.0 - 17.7 g/dL Final   09/03/2022 11.4 (L) 13.0 - 17.7 g/dL Final      HCT Hematocrit   Date Value Ref Range Status   09/06/2022 33.8 (L) 37.5 - 51.0 % Final   09/05/2022 33.7 (L) 37.5 - 51.0 % Final   09/04/2022 32.4 (L) 37.5 - 51.0 % Final   09/03/2022 34.7 (L) 37.5 - 51.0 % Final      Platelets Platelets   Date Value Ref Range Status   09/06/2022 283 140 - 450 10*3/mm3 Final   09/05/2022 303 140 - 450 10*3/mm3 Final   09/04/2022 318 140 - 450 10*3/mm3 Final   09/03/2022 318 140 - 450 10*3/mm3 Final        PT/INR:  No results found for: PROTIME/No results found for: INR    Sodium Sodium   Date Value Ref Range Status   09/06/2022 141 136 - 145 mmol/L Final   09/04/2022 139 136 - 145 mmol/L Final      Potassium Potassium   Date Value Ref Range Status   09/06/2022 3.8 3.5  - 5.2 mmol/L Final     Comment:     Slight hemolysis detected by analyzer. Results may be affected.   09/04/2022 4.2 3.5 - 5.2 mmol/L Final   09/04/2022 3.4 (L) 3.5 - 5.2 mmol/L Final      Chloride Chloride   Date Value Ref Range Status   09/06/2022 110 (H) 98 - 107 mmol/L Final   09/04/2022 108 (H) 98 - 107 mmol/L Final      Bicarbonate CO2   Date Value Ref Range Status   09/06/2022 19.7 (L) 22.0 - 29.0 mmol/L Final   09/04/2022 22.0 22.0 - 29.0 mmol/L Final      BUN BUN   Date Value Ref Range Status   09/06/2022 19 8 - 23 mg/dL Final   09/04/2022 42 (H) 8 - 23 mg/dL Final      Creatinine Creatinine   Date Value Ref Range Status   09/06/2022 1.03 0.76 - 1.27 mg/dL Final   09/04/2022 1.02 0.76 - 1.27 mg/dL Final      Calcium Calcium   Date Value Ref Range Status   09/06/2022 8.1 (L) 8.6 - 10.5 mg/dL Final   09/04/2022 8.7 8.6 - 10.5 mg/dL Final      Magnesium Magnesium   Date Value Ref Range Status   09/04/2022 2.5 (H) 1.6 - 2.4 mg/dL Final          apixaban, 5 mg, Oral, Q12H  aspirin, 81 mg, Oral, Daily  atenolol, 25 mg, Oral, Q12H  atorvastatin, 10 mg, Oral, Nightly  chlorhexidine, 15 mL, Mouth/Throat, Q12H  furosemide, 20 mg, Oral, Daily  lidocaine, , Topical, Once  mupirocin, , Each Nare, BID  pantoprazole, 40 mg, Oral, QAM  polyethylene glycol, 17 g, Oral, Daily  potassium chloride, 10 mEq, Oral, Daily  simethicone, 80 mg, Oral, 4x Daily               Patient Active Problem List   Diagnosis Code   • OA (osteoarthritis) of knee M17.10   • CAD (coronary artery disease) I25.10   • CAD (coronary artery disease), native coronary artery I25.10       Assessment & Plan    -Severe multivessel CAD- s/p CABGx4 LIMA/LSVG- [place]- POD#11  -VIRA on CKD stage II, BL creatinine 1.2-1.4----improved fxn, renal signed off  -Gout  -Hypertension  -Leukocytosis- probable reactive  -Regrowth of prostate with dystrophic calcifications--cystoscopy in the OR  - urinary retention  -post op atrial fibrillation-will need AC whenever  appropriate  -Colonic ileus-general surgery following       Blood pressure elevated-- will add some losartan  On room air-- tolerating   Increase PO lasix  Still with some weakness, eval for SNF pending, may improved for home with home health, will monitor PT notes.   Making good progress  Continue routine care       ROSEY Lake  09/06/22  07:39 EDT

## 2022-09-06 NOTE — PLAN OF CARE
Goal Outcome Evaluation:              Outcome Evaluation: S/p CBAG (POD11). NSR with PVCs throughout shift, room air and blood pressures stable. Patient voiding on own and PVR have been <100. Flomax and losartan started today. Plan to d/c home with home health once patient gets stronger with PT. Biggest challenge right now is going from a sitting to standing position, otherwise patient is a stand by assist with a walker. Will continue with current POC and update as needed.

## 2022-09-06 NOTE — CASE MANAGEMENT/SOCIAL WORK
Continued Stay Note  [facility]     Patient Name: Chaitanya Montesinos  MRN: [id number]  Today's Date: 9/6/2022    Admit Date: 8/25/2022     Discharge Plan     Row Name 09/06/22 1536       Plan    Plan [facility] SNF eval pending;  Pt will require [employer] Precert.    Plan Comments Pt wife and martín want a referral sent to [facility] sub acute rehab.  CCP sent referral to [facility].  CCP spoke with Johnna/Amarjit  and she has accepted and will follow Pt at [facility].  CCP following.....Missy BRITO/KYM REYES    Row Name 09/06/22 1411       Plan    Plan Home w/ home health (referrals pending)    Plan Comments [facility] spoke with Cely, daughter at [facility] office to update d/c plan.  Pt ambulated 240feet with Physical therapy.  Cely mentioned that Pt spouse wanted him to go to a skilled rehab at discharge.  [facility] explained to bernice that Pt has [employer] M[employer] insurance and he would not get precert for a sub acute rehab walking as far as he is.  Cely asked that home health referrals be sent for agencies that service both [place]  [facility] is [facility]  CCP S/W [facility]  and she advised that their [place] office no longer accepts [employer] [facility].  CCP sent referrals to Johnna/Amarjit .  CCP following........NURIS/RN CM               Discharge Codes    No documentation.               Expected Discharge Date and Time     Expected Discharge Date Expected Discharge Time    Sep 8, 2022             Missy Botello RN

## 2022-09-06 NOTE — THERAPY TREATMENT NOTE
Patient Name: Chaitanya Montesinos  : 1945    MRN: [id number]                              Today's Date: 2022       Admit Date: 2022    Visit Dx:     ICD-10-CM ICD-9-CM   1. S/P CABG (coronary artery bypass graft)  Z95.1 V45.81   2. CAD (coronary artery disease)  I25.10 414.00     Patient Active Problem List   Diagnosis   • OA (osteoarthritis) of knee   • CAD (coronary artery disease)   • CAD (coronary artery disease), native coronary artery     Past Medical History:   Diagnosis Date   • Arthritis    • BPH (benign prostatic hyperplasia)    • Eczema    • Gout    • Hard to intubate     PT'S WIFE SAID DR SANCHEZ SAID PT WAS HARD TO INTUBATE 2017   • High cholesterol    • Hypertension    • Joint pain    • Low back pain      Past Surgical History:   Procedure Laterality Date   • CATARACT EXTRACTION Right    • CORONARY ARTERY BYPASS GRAFT N/A 2022    Procedure: MEL STERNOTOMY CORONARY ARTERY BYPASS GRAFT TIMES 4 USING LEFT INTERNAL MAMMARY ARTERY AND LEFT GREATER SAPHENOUS VEIN GRAFT PER ENDOSCOPIC VEIN HARVESTING AND PRP;  Surgeon: Jr Wayne Ruiz MD;  Location: [facility];  Service: Cardiothoracic;  Laterality: N/A;   • CYSTOSCOPY N/A 2022    Procedure: CYSTOSCOPY WITH SUNG CATHETER PLACEMENT;  Surgeon: Westley Contreras MD;  Location: [facility];  Service: Urology;  Laterality: N/A;   • CYSTOSCOPY TRANSURETHRAL RESECTION OF PROSTATE     • KNEE ARTHROSCOPY Bilateral    • MN TOTAL KNEE ARTHROPLASTY Right 2017    Procedure: RT TOTAL KNEE ARTHROPLASTY;  Surgeon: Arvind Jones MD;  Location: [facility] OR;  Service: Orthopedics   • MN TOTAL KNEE ARTHROPLASTY Left 2017    Procedure: LT TOTAL KNEE ARTHROPLASTY;  Surgeon: Arvind Jones MD;  Location: [facility] OR;  Service: Orthopedics   • TONSILLECTOMY        General Information     Row Name 22 1055          Physical Therapy Time and Intention    Document Type therapy note (daily note) (P)   -SM     Mode of  Treatment physical therapy (P)   -[facility] Name 09/06/22 1055          General Information    Existing Precautions/Restrictions fall;cardiac;sternal (P)   -[facility] Name 09/06/22 1055          Cognition    Orientation Status (Cognition) oriented x 4 (P)   -[facility] Name 09/06/22 1055          Safety Issues, Functional Mobility    Impairments Affecting Function (Mobility) balance;endurance/activity tolerance;strength;pain (P)   -           User Key  (r) = Recorded By, (t) = Taken By, (c) = Cosigned By    Initials Name Provider Type    Arie Olguin, PT Student PT Student               Mobility     [facility] Name 09/06/22 1056          Bed Mobility    Bed Mobility supine-sit;sit-supine (P)   -     Supine-Sit [place] (Bed Mobility) verbal cues;standby assist (P)   -     Sit-Supine [place] (Bed Mobility) standby assist;verbal cues (P)   -     Assistive Device (Bed Mobility) head of bed elevated (P)   -     Comment, (Bed Mobility) Pt was ablee to perform bed mobility with minimal verbal cues on this date but continues to have increased time to completion related to pain and UE restrictions. (P)   -[facility] Name 09/06/22 1056          Bed-Chair Transfer    Bed-Chair [place] (Transfers) not tested (P)   -[facility] Name 09/06/22 1056          Sit-Stand Transfer    Sit-Stand [place] (Transfers) contact guard;verbal cues (P)   -     Assistive Device (Sit-Stand Transfers) walker, front-wheeled (P)   -[facility] Name 09/06/22 1056          Gait/Stairs (Locomotion)    [place] Level (Gait) standby assist;verbal cues (P)   -     Assistive Device (Gait) walker, front-wheeled (P)   -     Distance in Feet (Gait) 240 ft continues to be SBA. (P)   -     Deviations/Abnormal Patterns (Gait) stride length decreased;gait speed decreased (P)   -     [place] Level (Stairs) not tested (P)   -     Comment, (Gait/Stairs) Pt continues to have slow pace with ambulation but is increasing  his ambulatory ability. He was able to make 2 laps around the nurses unit before returning to his room. (P)   -           User Key  (r) = Recorded By, (t) = Taken By, (c) = Cosigned By    Initials Name Provider Type    Arie Olguin, PT Student PT Student               Obj/Interventions     Row Name 09/06/22 1101          Range of Motion Comprehensive    Comment, General Range of Motion RUE continues to be limited related to pain. (P)   -     Row Name 09/06/22 1101          Strength Comprehensive (MMT)    General Manual Muscle Testing (MMT) Assessment no strength deficits identified (P)   -SM     Row Name 09/06/22 1101          Motor Skills    Therapeutic Exercise other (see comments) (P)   10 reps ankle pumps and cardiac protocol  -     Row Name 09/06/22 1101          Balance    Balance Assessment standing static balance;standing dynamic balance (P)   -     Static Standing Balance contact guard;verbal cues (P)   -     Dynamic Standing Balance verbal cues;standby assist (P)   -     Position/Device Used, Standing Balance walker, front-wheeled (P)   -SM     Balance Interventions standing;static;dynamic;minimal challenge (P)   -SM           User Key  (r) = Recorded By, (t) = Taken By, (c) = Cosigned By    Initials Name Provider Type    Arie Olguin, PT Student PT Student               Goals/Plan     Row Name 09/06/22 1132          Therapy Assessment/Plan (PT)    Planned Therapy Interventions (PT) balance training;bed mobility training;gait training;transfer training;home exercise program;patient/family education;ROM (range of motion);strengthening;stair training (P)   -           User Key  (r) = Recorded By, (t) = Taken By, (c) = Cosigned By    Initials Name Provider Type    Arie Olguin, PT Student PT Student               Clinical Impression     Row Name 09/06/22 1104          Pain    Pain Location incisional (P)   -SM     Pain Location - chest (P)   -SM     Pre/Posttreatment Pain Comment  Pt had no complaints of pain during resting but increased pain during activity. (P)   -SM     Pain Intervention(s) Repositioned (P)   -     Row Name 09/06/22 1104          Plan of Care Review    Plan of Care Reviewed With patient;family (P)   -SM     Progress improving (P)   -SM     Outcome Evaluation Pt was found sitting up in bed alert and oriented x4 with family on this date. Pt was able to perform all cardiac protocol exercises while sitting EOB with out assistance. Pt was able to perform bed mobility with CGA when moving to EOB and minimal assistance x1 for return to bed as he needed asssitsance to reposition his legs once lying down. Pt was able to perform sit to stand successfully on his second attempt. Pt was CGA with transfers on this date. Pt ambulated 240 ft with slow gait speed but had adequate foot clearance bilaterally. Pt was standby assist with ambulation during treatment. Pt continues to improve with functional mobility as well as increased independence. Pt reported diffuclty with wipping after have BMs. Pt was educated on why difficulty was present and on how personal care would return with time related to surgical healing. At thist time pt would continue to benefit from skilled PT services to improve independence, functional mobility, and improve safety. After pt progress pt would be safe to return home with HHPT services at D/C. (P)   -     Row Name 09/06/22 1104          Vital Signs    O2 Delivery Post Treatment room air (P)   -     Row Name 09/06/22 1104          Positioning and Restraints    Pre-Treatment Position in bed (P)   -SM     Post Treatment Position bed (P)   -SM     In Bed supine;call light within reach;encouraged to call for assist;exit alarm on;with family/caregiver (P)   -           User Key  (r) = Recorded By, (t) = Taken By, (c) = Cosigned By    Initials Name Provider Type    Arie Olguin, PT Student PT Student               Outcome Measures     Row Name 09/06/22  1113 09/06/22 0808       How much help from another person do you currently need...    Turning from your back to your side while in flat bed without using bedrails? 3 (P)   -SM 3  -CB    Moving from lying on back to sitting on the side of a flat bed without bedrails? 3 (P)   -SM 3  -CB    Moving to and from a bed to a chair (including a wheelchair)? 3 (P)   -SM 3  -CB    Standing up from a chair using your arms (e.g., wheelchair, bedside chair)? 3 (P)   -SM 3  -CB    Climbing 3-5 steps with a railing? 3 (P)   -SM 3  -CB    To walk in hospital room? 3 (P)   -SM 3  -CB    AM-PAC 6 Clicks Score (PT) 18 (P)   -SM 18  -CB    Highest level of mobility 6 --> Walked 10 steps or more (P)   -SM 6 --> Walked 10 steps or more  -CB    Row Name 09/06/22 1113          Functional Assessment    Outcome Measure Options AM-PAC 6 Clicks Basic Mobility (PT) (P)   -SM           User Key  (r) = Recorded By, (t) = Taken By, (c) = Cosigned By    Initials Name Provider Type    Nydia Mercer, RN Registered Nurse    Arie Olguin, PT Student PT Student                             Physical Therapy Education                 Title: PT OT SLP Therapies (Done)     Topic: Physical Therapy (Done)     Point: Mobility training (Done)     Learning Progress Summary           Patient Acceptance, E, VU,DU,NR by  at 9/6/2022 1113    Acceptance, E, NR by AR at 9/5/2022 1724    Acceptance, E, NR by AR at 9/4/2022 1656    Acceptance, E, NR by AR at 9/3/2022 1658    Acceptance, E, VU by EM at 9/2/2022 1340    Acceptance, E, NR,VU by EM at 8/31/2022 1321    Acceptance, E,TB,D, VU,NR by CB at 8/30/2022 1527    Acceptance, E, VU by EM at 8/28/2022 1349    Acceptance, E, VU by EM at 8/27/2022 1444   Family Acceptance, E, VU,DU,NR by  at 9/6/2022 1113    Acceptance, E, NR by AR at 9/4/2022 1656    Acceptance, E, NR by AR at 9/3/2022 1658                   Point: Home exercise program (Done)     Learning Progress Summary           Patient Acceptance, E,  VU,DU,NR by SM at 9/6/2022 1113    Acceptance, E, NR by AR at 9/5/2022 1724    Acceptance, E, NR by AR at 9/4/2022 1656    Acceptance, E, NR by AR at 9/3/2022 1658    Acceptance, E,TB,D, VU,NR by CB at 8/30/2022 1527    Acceptance, E, VU by EM at 8/28/2022 1349    Acceptance, E, VU by EM at 8/27/2022 1444   Family Acceptance, E, VU,DU,NR by SM at 9/6/2022 1113    Acceptance, E, NR by AR at 9/4/2022 1656    Acceptance, E, NR by AR at 9/3/2022 1658                   Point: Body mechanics (Done)     Learning Progress Summary           Patient Acceptance, E, VU,DU,NR by SM at 9/6/2022 1113    Acceptance, E, NR by AR at 9/5/2022 1724    Acceptance, E, NR by AR at 9/4/2022 1656    Acceptance, E, NR by AR at 9/3/2022 1658    Acceptance, E,TB,D, VU,NR by CB at 8/30/2022 1527   Family Acceptance, E, VU,DU,NR by SM at 9/6/2022 1113    Acceptance, E, NR by AR at 9/4/2022 1656    Acceptance, E, NR by AR at 9/3/2022 1658                   Point: Precautions (Done)     Learning Progress Summary           Patient Acceptance, E, VU,DU,NR by SM at 9/6/2022 1113    Acceptance, E, NR by AR at 9/5/2022 1724    Acceptance, E, NR by AR at 9/4/2022 1656    Acceptance, E, NR by AR at 9/3/2022 1658    Acceptance, E,TB,D, VU,NR by CB at 8/30/2022 1527   Family Acceptance, E, VU,DU,NR by SM at 9/6/2022 1113    Acceptance, E, NR by AR at 9/4/2022 1656    Acceptance, E, NR by AR at 9/3/2022 1658                               User Key     Initials Effective Dates Name Provider Type Discipline    EM 06/16/21 -  [place], Sonam A, PT Physical Therapist PT    AR 06/16/21 -  Claire Maher, PT Physical Therapist PT    CB 10/22/21 -  Barbie Peña, PT Physical Therapist PT    SM 08/15/22 -  Arie Pradhan, MICAELA Student PT Student PT              PT Recommendation and Plan  Planned Therapy Interventions (PT): (P) balance training, bed mobility training, gait training, transfer training, home exercise program, patient/family education, ROM (range  of motion), strengthening, stair training  Plan of Care Reviewed With: (P) patient, family  Progress: (P) improving  Outcome Evaluation: (P) Pt was found sitting up in bed alert and oriented x4 with family on this date. Pt was able to perform all cardiac protocol exercises while sitting EOB with out assistance. Pt was able to perform bed mobility with CGA when moving to EOB and minimal assistance x1 for return to bed as he needed asssitsance to reposition his legs once lying down. Pt was able to perform sit to stand successfully on his second attempt. Pt was CGA with transfers on this date. Pt ambulated 240 ft with slow gait speed but had adequate foot clearance bilaterally. Pt was standby assist with ambulation during treatment. Pt continues to improve with functional mobility as well as increased independence. Pt reported diffuclty with wipping after have BMs. Pt was educated on why difficulty was present and on how personal care would return with time related to surgical healing. At thist time pt would continue to benefit from skilled PT services to improve independence, functional mobility, and improve safety. After pt progress pt would be safe to return home with HHPT services at D/C.     Time Calculation:    PT Charges     Row Name 09/06/22 1114             Time Calculation    Start Time 0931 (P)   -      Stop Time 0955 (P)   -      Time Calculation (min) 24 min (P)   -      PT Received On 09/06/22 (P)   -      PT - Next Appointment 09/07/22 (P)   -              Time Calculation- PT    Total Timed Code Minutes- PT 24 minute(s) (P)   -              Timed Charges    86725 - Gait Training Minutes  10 (P)   -      49231 - PT Therapeutic Activity Minutes 14 (P)   -              Total Minutes    Timed Charges Total Minutes 24 (P)   -       Total Minutes 24 (P)   -            User Key  (r) = Recorded By, (t) = Taken By, (c) = Cosigned By    Initials Name Provider Type    Arie Olguin, PT  Student PT Student              Therapy Charges for Today     Code Description Service Date Service Provider Modifiers Qty    34982286177 HC PT THERAPEUTIC ACT EA 15 MIN 9/6/2022 Arie Pradhan, PT Student GP 1    [id number]  GAIT TRAINING EA 15 MIN 9/6/2022 Arie Pradhan, PT Student GP 1          PT G-Codes  Outcome Measure Options: (P) AM-PAC 6 Clicks Basic Mobility (PT)  AM-PAC 6 Clicks Score (PT): (P) 18    Arie Pradhan PT Student  9/6/2022

## 2022-09-06 NOTE — PLAN OF CARE
Goal Outcome Evaluation:  Plan of Care Reviewed With: (P) patient, family        Progress: (P) improving  Outcome Evaluation: (P) Pt was found sitting up in bed alert and oriented x4 with family on this date. Pt was able to perform all cardiac protocol exercises while sitting EOB with out assistance. Pt was able to perform bed mobility with CGA when moving to EOB and minimal assistance x1 for return to bed as he needed asssitsance to reposition his legs once lying down. Pt was able to perform sit to stand successfully on his second attempt. Pt was CGA with transfers on this date. Pt ambulated 240 ft with slow gait speed but had adequate foot clearance bilaterally. Pt was standby assist with ambulation during treatment. Pt continues to improve with functional mobility as well as increased independence. Pt reported diffuclty with wipping after have BMs. Pt was educated on why difficulty was present and on how personal care would return with time related to surgical healing. At thist time pt would continue to benefit from skilled PT services to improve independence, functional mobility, and improve safety. After pt progress pt would be safe to return home with HHPT services at D/C.

## 2022-09-06 NOTE — PLAN OF CARE
Goal Outcome Evaluation:  Plan of Care Reviewed With: patient, spouse, daughter        Progress: improving   Patient s/p CABG x4 with no c/o pain at this time. GI issues improving and patient had BM last night. Ileus decreasing. Bladder scan <200 last night and patient able to void 100-200 ml each time. Patient continues to walk with walker with x1 assist. Blood pressure elevated 160/60's otherwise 130-150/70-80's. Will continue to monitor and await discharge plans.

## 2022-09-07 ENCOUNTER — PREP FOR SURGERY (OUTPATIENT)
Dept: OTHER | Facility: HOSPITAL | Age: 77
End: 2022-09-07

## 2022-09-07 ENCOUNTER — APPOINTMENT (OUTPATIENT)
Dept: GENERAL RADIOLOGY | Facility: HOSPITAL | Age: 77
End: 2022-09-07

## 2022-09-07 LAB
BACTERIA UR QL AUTO: ABNORMAL /HPF
BASOPHILS # BLD AUTO: 0.09 10*3/MM3 (ref 0–0.2)
BASOPHILS NFR BLD AUTO: 0.7 % (ref 0–1.5)
BILIRUB UR QL STRIP: NEGATIVE
CLARITY UR: ABNORMAL
COLOR UR: ABNORMAL
DEPRECATED RDW RBC AUTO: 43.5 FL (ref 37–54)
EOSINOPHIL # BLD AUTO: 0.74 10*3/MM3 (ref 0–0.4)
EOSINOPHIL NFR BLD AUTO: 6 % (ref 0.3–6.2)
ERYTHROCYTE [DISTWIDTH] IN BLOOD BY AUTOMATED COUNT: 13 % (ref 12.3–15.4)
GLUCOSE UR STRIP-MCNC: NEGATIVE MG/DL
HCT VFR BLD AUTO: 34.7 % (ref 37.5–51)
HGB BLD-MCNC: 11.8 G/DL (ref 13–17.7)
HGB UR QL STRIP.AUTO: ABNORMAL
HYALINE CASTS UR QL AUTO: ABNORMAL /LPF
IMM GRANULOCYTES # BLD AUTO: 0.32 10*3/MM3 (ref 0–0.05)
IMM GRANULOCYTES NFR BLD AUTO: 2.6 % (ref 0–0.5)
KETONES UR QL STRIP: NEGATIVE
LEUKOCYTE ESTERASE UR QL STRIP.AUTO: ABNORMAL
LYMPHOCYTES # BLD AUTO: 1.88 10*3/MM3 (ref 0.7–3.1)
LYMPHOCYTES NFR BLD AUTO: 15.3 % (ref 19.6–45.3)
MCH RBC QN AUTO: 31 PG (ref 26.6–33)
MCHC RBC AUTO-ENTMCNC: 34 G/DL (ref 31.5–35.7)
MCV RBC AUTO: 91.1 FL (ref 79–97)
MONOCYTES # BLD AUTO: 1.36 10*3/MM3 (ref 0.1–0.9)
MONOCYTES NFR BLD AUTO: 11.1 % (ref 5–12)
NEUTROPHILS NFR BLD AUTO: 64.3 % (ref 42.7–76)
NEUTROPHILS NFR BLD AUTO: 7.91 10*3/MM3 (ref 1.7–7)
NITRITE UR QL STRIP: NEGATIVE
NRBC BLD AUTO-RTO: 0 /100 WBC (ref 0–0.2)
PH UR STRIP.AUTO: 5.5 [PH] (ref 5–8)
PLATELET # BLD AUTO: 308 10*3/MM3 (ref 140–450)
PMV BLD AUTO: 9.9 FL (ref 6–12)
PROT UR QL STRIP: ABNORMAL
RBC # BLD AUTO: 3.81 10*6/MM3 (ref 4.14–5.8)
RBC # UR STRIP: ABNORMAL /HPF
REF LAB TEST METHOD: ABNORMAL
SP GR UR STRIP: 1.01 (ref 1–1.03)
SQUAMOUS #/AREA URNS HPF: ABNORMAL /HPF
UROBILINOGEN UR QL STRIP: ABNORMAL
WBC # UR STRIP: ABNORMAL /HPF
WBC NRBC COR # BLD: 12.3 10*3/MM3 (ref 3.4–10.8)

## 2022-09-07 PROCEDURE — 71046 X-RAY EXAM CHEST 2 VIEWS: CPT

## 2022-09-07 PROCEDURE — 94799 UNLISTED PULMONARY SVC/PX: CPT

## 2022-09-07 PROCEDURE — 87086 URINE CULTURE/COLONY COUNT: CPT | Performed by: NURSE PRACTITIONER

## 2022-09-07 PROCEDURE — 85025 COMPLETE CBC W/AUTO DIFF WBC: CPT | Performed by: PHYSICIAN ASSISTANT

## 2022-09-07 PROCEDURE — 97530 THERAPEUTIC ACTIVITIES: CPT

## 2022-09-07 PROCEDURE — 81001 URINALYSIS AUTO W/SCOPE: CPT | Performed by: NURSE PRACTITIONER

## 2022-09-07 PROCEDURE — 87186 SC STD MICRODIL/AGAR DIL: CPT | Performed by: NURSE PRACTITIONER

## 2022-09-07 RX ORDER — LOSARTAN POTASSIUM 50 MG/1
50 TABLET ORAL
Status: DISCONTINUED | OUTPATIENT
Start: 2022-09-08 | End: 2022-09-08

## 2022-09-07 RX ORDER — BUMETANIDE 2 MG/1
2 TABLET ORAL DAILY
Status: DISCONTINUED | OUTPATIENT
Start: 2022-09-07 | End: 2022-09-09 | Stop reason: HOSPADM

## 2022-09-07 RX ADMIN — SIMETHICONE 80 MG: 80 TABLET, CHEWABLE ORAL at 16:50

## 2022-09-07 RX ADMIN — MONTELUKAST SODIUM 10 MG: 10 TABLET, FILM COATED ORAL at 20:56

## 2022-09-07 RX ADMIN — ATORVASTATIN CALCIUM 10 MG: 20 TABLET, FILM COATED ORAL at 20:56

## 2022-09-07 RX ADMIN — LOSARTAN POTASSIUM 25 MG: 25 TABLET, FILM COATED ORAL at 09:03

## 2022-09-07 RX ADMIN — MUPIROCIN 1 APPLICATION: 20 OINTMENT TOPICAL at 09:04

## 2022-09-07 RX ADMIN — BUMETANIDE 2 MG: 2 TABLET ORAL at 16:48

## 2022-09-07 RX ADMIN — SIMETHICONE 80 MG: 80 TABLET, CHEWABLE ORAL at 20:56

## 2022-09-07 RX ADMIN — SIMETHICONE 80 MG: 80 TABLET, CHEWABLE ORAL at 09:04

## 2022-09-07 RX ADMIN — MUPIROCIN 1 APPLICATION: 20 OINTMENT TOPICAL at 20:56

## 2022-09-07 RX ADMIN — ATENOLOL 25 MG: 25 TABLET ORAL at 09:04

## 2022-09-07 RX ADMIN — ASPIRIN 81 MG: 81 TABLET, COATED ORAL at 09:03

## 2022-09-07 RX ADMIN — ATENOLOL 25 MG: 25 TABLET ORAL at 20:56

## 2022-09-07 RX ADMIN — SIMETHICONE 80 MG: 80 TABLET, CHEWABLE ORAL at 11:59

## 2022-09-07 RX ADMIN — APIXABAN 5 MG: 5 TABLET, FILM COATED ORAL at 09:03

## 2022-09-07 RX ADMIN — FUROSEMIDE 40 MG: 40 TABLET ORAL at 09:03

## 2022-09-07 RX ADMIN — TAMSULOSIN HYDROCHLORIDE 0.4 MG: 0.4 CAPSULE ORAL at 09:03

## 2022-09-07 RX ADMIN — CHLORHEXIDINE GLUCONATE 15 ML: 1.2 SOLUTION ORAL at 09:04

## 2022-09-07 RX ADMIN — PANTOPRAZOLE SODIUM 40 MG: 40 TABLET, DELAYED RELEASE ORAL at 06:19

## 2022-09-07 RX ADMIN — POTASSIUM CHLORIDE 20 MEQ: 750 TABLET, EXTENDED RELEASE ORAL at 09:04

## 2022-09-07 NOTE — PROGRESS NOTES
LOS: 13 days   Patient Care Team:  Keely Lo APRN as PCP - General (Family Medicine)  eJffrey Inman MD as Consulting Physician (Cardiology)    Chief Complaint: post op    Subjective      Vital Signs  Temp:  [97.4 °F (36.3 °C)-97.8 °F (36.6 °C)] 97.4 °F (36.3 °C)  Heart Rate:  [56-71] 64  Resp:  [18-20] 18  BP: (130-157)/(72-77) 148/72  Body mass index is 35.37 kg/m².    Intake/Output Summary (Last 24 hours) at 9/7/2022 0758  Last data filed at 9/7/2022 0700  Gross per 24 hour   Intake 360 ml   Output 1707 ml   Net -1347 ml     No intake/output data recorded.    Chest tube drainage last 8 hours         09/05/22  0445 09/06/22  0600 09/07/22  0659   Weight: 135 kg (297 lb 3.2 oz) 135 kg (296 lb 14.4 oz) 135 kg (298 lb 4.8 oz)         Objective    Results Review:        WBC WBC   Date Value Ref Range Status   09/07/2022 12.30 (H) 3.40 - 10.80 10*3/mm3 Final   09/06/2022 10.59 3.40 - 10.80 10*3/mm3 Final   09/05/2022 9.75 3.40 - 10.80 10*3/mm3 Final      HGB Hemoglobin   Date Value Ref Range Status   09/07/2022 11.8 (L) 13.0 - 17.7 g/dL Final   09/06/2022 11.3 (L) 13.0 - 17.7 g/dL Final   09/05/2022 11.2 (L) 13.0 - 17.7 g/dL Final      HCT Hematocrit   Date Value Ref Range Status   09/07/2022 34.7 (L) 37.5 - 51.0 % Final   09/06/2022 33.8 (L) 37.5 - 51.0 % Final   09/05/2022 33.7 (L) 37.5 - 51.0 % Final      Platelets Platelets   Date Value Ref Range Status   09/07/2022 308 140 - 450 10*3/mm3 Final   09/06/2022 283 140 - 450 10*3/mm3 Final   09/05/2022 303 140 - 450 10*3/mm3 Final        PT/INR:  No results found for: PROTIME/No results found for: INR    Sodium Sodium   Date Value Ref Range Status   09/06/2022 141 136 - 145 mmol/L Final      Potassium Potassium   Date Value Ref Range Status   09/06/2022 3.8 3.5 - 5.2 mmol/L Final     Comment:     Slight hemolysis detected by analyzer. Results may be affected.   09/04/2022 4.2 3.5 - 5.2 mmol/L Final      Chloride Chloride   Date Value Ref Range Status    09/06/2022 110 (H) 98 - 107 mmol/L Final      Bicarbonate CO2   Date Value Ref Range Status   09/06/2022 19.7 (L) 22.0 - 29.0 mmol/L Final      BUN BUN   Date Value Ref Range Status   09/06/2022 19 8 - 23 mg/dL Final      Creatinine Creatinine   Date Value Ref Range Status   09/06/2022 1.03 0.76 - 1.27 mg/dL Final      Calcium Calcium   Date Value Ref Range Status   09/06/2022 8.1 (L) 8.6 - 10.5 mg/dL Final      Magnesium Magnesium   Date Value Ref Range Status   09/04/2022 2.5 (H) 1.6 - 2.4 mg/dL Final          apixaban, 5 mg, Oral, Q12H  aspirin, 81 mg, Oral, Daily  atenolol, 25 mg, Oral, Q12H  atorvastatin, 10 mg, Oral, Nightly  chlorhexidine, 15 mL, Mouth/Throat, Q12H  furosemide, 40 mg, Oral, Daily  lidocaine, , Topical, Once  losartan, 25 mg, Oral, Q24H  montelukast, 10 mg, Oral, Nightly  mupirocin, , Each Nare, BID  pantoprazole, 40 mg, Oral, QAM  polyethylene glycol, 17 g, Oral, Daily  potassium chloride, 20 mEq, Oral, Daily  simethicone, 80 mg, Oral, 4x Daily  tamsulosin, 0.4 mg, Oral, Daily               Patient Active Problem List   Diagnosis Code   • OA (osteoarthritis) of knee M17.10   • CAD (coronary artery disease) I25.10   • CAD (coronary artery disease), native coronary artery I25.10       Assessment & Plan    -Severe multivessel CAD- s/p CABGx4 LIMA/LSVG- [place]- POD#12  -VIRA on CKD stage II, BL creatinine 1.2-1.4----improved fxn, renal signed off  -Gout  -Hypertension  -Leukocytosis- probable reactive  -Regrowth of prostate with dystrophic calcifications--cystoscopy in the OR  - urinary retention  -post op atrial fibrillation-will need AC whenever appropriate  -Colonic ileus-general surgery following        WBC increased this morning-- 12  Will get CXR and check a U/A  His urine is pretty dark-- he had some blood in his last U/A-- will see if this has resolved or worsened   Repeat CBC in AM  Edema not really improving and weight is up will give PO bumex today  On room air-- tolerating   Making  good progress  May be ready to go home with home health tomorrow pending progress  Continue routine care         ROSEY Lake  09/07/22  07:58 EDT

## 2022-09-07 NOTE — THERAPY DISCHARGE NOTE
Patient Name: Chaitanya Montesinos  : 1945    MRN: [id number]                              Today's Date: 2022       Admit Date: 2022    Visit Dx:     ICD-10-CM ICD-9-CM   1. S/P CABG (coronary artery bypass graft)  Z95.1 V45.81   2. CAD (coronary artery disease)  I25.10 414.00     Patient Active Problem List   Diagnosis   • OA (osteoarthritis) of knee   • CAD (coronary artery disease)   • CAD (coronary artery disease), native coronary artery     Past Medical History:   Diagnosis Date   • Arthritis    • BPH (benign prostatic hyperplasia)    • Eczema    • Gout    • Hard to intubate     PT'S WIFE SAID DR SANCHEZ SAID PT WAS HARD TO INTUBATE 2017   • High cholesterol    • Hypertension    • Joint pain    • Low back pain      Past Surgical History:   Procedure Laterality Date   • CATARACT EXTRACTION Right    • CORONARY ARTERY BYPASS GRAFT N/A 2022    Procedure: MEL STERNOTOMY CORONARY ARTERY BYPASS GRAFT TIMES 4 USING LEFT INTERNAL MAMMARY ARTERY AND LEFT GREATER SAPHENOUS VEIN GRAFT PER ENDOSCOPIC VEIN HARVESTING AND PRP;  Surgeon: Jr Wayne uRiz MD;  Location: [facility];  Service: Cardiothoracic;  Laterality: N/A;   • CYSTOSCOPY N/A 2022    Procedure: CYSTOSCOPY WITH SUNG CATHETER PLACEMENT;  Surgeon: Westley Contreras MD;  Location: [facility];  Service: Urology;  Laterality: N/A;   • CYSTOSCOPY TRANSURETHRAL RESECTION OF PROSTATE     • KNEE ARTHROSCOPY Bilateral    • DE TOTAL KNEE ARTHROPLASTY Right 2017    Procedure: RT TOTAL KNEE ARTHROPLASTY;  Surgeon: Arvind Jones MD;  Location: [facility] OR;  Service: Orthopedics   • DE TOTAL KNEE ARTHROPLASTY Left 2017    Procedure: LT TOTAL KNEE ARTHROPLASTY;  Surgeon: Arvind Jones MD;  Location: [facility] OR;  Service: Orthopedics   • TONSILLECTOMY        General Information     Row Name 22 1148          Physical Therapy Time and Intention    Document Type discharge treatment  -CB     Mode of Treatment  physical therapy  -CB     Row Name 09/07/22 1148          General Information    Existing Precautions/Restrictions fall;cardiac;sternal  -CB     Row Name 09/07/22 1148          Cognition    Orientation Status (Cognition) oriented x 4  -CB           User Key  (r) = Recorded By, (t) = Taken By, (c) = Cosigned By    Initials Name Provider Type    CB Barbie Peña PT Physical Therapist               Mobility     Row Name 09/07/22 1148          Bed Mobility    Comment, (Bed Mobility) standing in room upon PT entering  -CB     Row Name 09/07/22 1148          Sit-Stand Transfer    Sit-Stand [place] (Transfers) contact guard;verbal cues  -CB     Assistive Device (Sit-Stand Transfers) walker, front-wheeled  -CB     Comment, (Sit-Stand Transfer) x2 STS  -CB     Row Name 09/07/22 1148          Gait/Stairs (Locomotion)    [place] Level (Gait) standby assist;verbal cues  -CB     Assistive Device (Gait) walker, front-wheeled  -CB     Distance in Feet (Gait) 250ft  -CB     Deviations/Abnormal Patterns (Gait) stride length decreased;gait speed decreased  -CB     Bilateral Gait Deviations forward flexed posture  -CB     [place] Level (Stairs) stand by assist;contact guard;verbal cues  -CB     Handrail Location (Stairs) both sides  -CB     Number of Steps (Stairs) 6  -CB     Ascending Technique (Stairs) step-to-step  -CB     Descending Technique (Stairs) step-to-step  -CB     Comment, (Gait/Stairs) no LOB or unsteadiness noted; increased time with ambulation and stair training today  -CB           User Key  (r) = Recorded By, (t) = Taken By, (c) = Cosigned By    Initials Name Provider Type    Barbie Rueda PT Physical Therapist               Obj/Interventions     Row Name 09/07/22 1150          Motor Skills    Therapeutic Exercise --  reviewed cardiac protocol x10 reps  -CB     Row Name 09/07/22 1150          Balance    Balance Assessment standing static balance;standing dynamic balance  -CB     Static Standing  Balance standby assist  -CB     Dynamic Standing Balance standby assist;contact guard  -CB     Position/Device Used, Standing Balance supported;walker, front-wheeled  -CB     Balance Interventions sitting;standing;sit to stand;supported;static;dynamic;minimal challenge  -CB           User Key  (r) = Recorded By, (t) = Taken By, (c) = Cosigned By    Initials Name Provider Type    CB Barbie Peña, PT Physical Therapist               Goals/Plan    No documentation.                Clinical Impression     Row Name 09/07/22 1150          Pain    Pretreatment Pain Rating 0/10 - no pain  -CB     Row Name 09/07/22 1150          Plan of Care Review    Plan of Care Reviewed With patient;spouse  -CB     Progress improving  -CB     Outcome Evaluation Patient participated with PT this AM. He ambulated 250ft using rwx requiring SBA and ascended/descended 6 steps with handrails requiring SBA/CGA. Cues to maintain upright posture during ambulation. PT reviewed cardiac protocol, use of rwx at dc within home, use of BSC for toileting for higher surface, and sternal precuations. No further acute PT needs. Plan home with spouse and HHPT.  -CB     Row Name 09/07/22 1150          Positioning and Restraints    Pre-Treatment Position standing in room  -CB     Post Treatment Position chair  -CB     In Chair notified nsg;reclined;call light within reach;encouraged to call for assist;with family/caregiver  -CB           User Key  (r) = Recorded By, (t) = Taken By, (c) = Cosigned By    Initials Name Provider Type    CB Barbie Peña, PT Physical Therapist               Outcome Measures     Row Name 09/07/22 1153 09/07/22 0800       How much help from another person do you currently need...    Turning from your back to your side while in flat bed without using bedrails? 4  -CB 4  -SS    Moving from lying on back to sitting on the side of a flat bed without bedrails? 4  -CB 3  -SS    Moving to and from a bed to a chair (including a  wheelchair)? 3  -CB 3  -SS    Standing up from a chair using your arms (e.g., wheelchair, bedside chair)? 3  -CB 3  -SS    Climbing 3-5 steps with a railing? 3  -CB 2  -SS    To walk in hospital room? 3  -CB 3  -SS    AM-PAC 6 Clicks Score (PT) 20  -CB 18  -SS    Highest level of mobility 6 --> Walked 10 steps or more  -CB 6 --> Walked 10 steps or more  -SS    Row Name 09/07/22 1153          Functional Assessment    Outcome Measure Options AM-PAC 6 Clicks Basic Mobility (PT)  -CB           User Key  (r) = Recorded By, (t) = Taken By, (c) = Cosigned By    Initials Name Provider Type    Barbie Rueda, PT Physical Therapist    SS Kasia Shay, RN Registered Nurse              Physical Therapy Education                 Title: PT OT SLP Therapies (Done)     Topic: Physical Therapy (Done)     Point: Mobility training (Done)     Learning Progress Summary           Patient Acceptance, E,TB,D, VU,DU by CB at 9/7/2022 1153    Acceptance, E, VU,DU,NR by SM at 9/6/2022 1113    Acceptance, E, NR by AR at 9/5/2022 1724    Acceptance, E, NR by AR at 9/4/2022 1656    Acceptance, E, NR by AR at 9/3/2022 1658    Acceptance, E, VU by EM at 9/2/2022 1340    Acceptance, E, NR,VU by EM at 8/31/2022 1321    Acceptance, E,TB,D, VU,NR by CB at 8/30/2022 1527    Acceptance, E, VU by EM at 8/28/2022 1349    Acceptance, E, VU by EM at 8/27/2022 1444   Family Acceptance, E, VU,DU,NR by SM at 9/6/2022 1113    Acceptance, E, NR by AR at 9/4/2022 1656    Acceptance, E, NR by AR at 9/3/2022 1658   Significant Other Acceptance, E,TB,D, VU,DU by CB at 9/7/2022 1153                   Point: Home exercise program (Done)     Learning Progress Summary           Patient Acceptance, E,TB,D, VU,DU by CB at 9/7/2022 1153    Acceptance, E, VU,DU,NR by SM at 9/6/2022 1113    Acceptance, E, NR by AR at 9/5/2022 1724    Acceptance, E, NR by AR at 9/4/2022 1656    Acceptance, E, NR by AR at 9/3/2022 1658    Acceptance, E,TB,D, VU,NR by CB at 8/30/2022 1527     Acceptance, E, VU by EM at 8/28/2022 1349    Acceptance, E, VU by EM at 8/27/2022 1444   Family Acceptance, E, VU,DU,NR by SM at 9/6/2022 1113    Acceptance, E, NR by AR at 9/4/2022 1656    Acceptance, E, NR by AR at 9/3/2022 1658   Significant Other Acceptance, E,TB,D, VU,DU by CB at 9/7/2022 1153                   Point: Body mechanics (Done)     Learning Progress Summary           Patient Acceptance, E,TB,D, VU,DU by CB at 9/7/2022 1153    Acceptance, E, VU,DU,NR by SM at 9/6/2022 1113    Acceptance, E, NR by AR at 9/5/2022 1724    Acceptance, E, NR by AR at 9/4/2022 1656    Acceptance, E, NR by AR at 9/3/2022 1658    Acceptance, E,TB,D, VU,NR by CB at 8/30/2022 1527   Family Acceptance, E, VU,DU,NR by SM at 9/6/2022 1113    Acceptance, E, NR by AR at 9/4/2022 1656    Acceptance, E, NR by AR at 9/3/2022 1658   Significant Other Acceptance, E,TB,D, VU,DU by CB at 9/7/2022 1153                   Point: Precautions (Done)     Learning Progress Summary           Patient Acceptance, E,TB,D, VU,DU by CB at 9/7/2022 1153    Acceptance, E, VU,DU,NR by SM at 9/6/2022 1113    Acceptance, E, NR by AR at 9/5/2022 1724    Acceptance, E, NR by AR at 9/4/2022 1656    Acceptance, E, NR by AR at 9/3/2022 1658    Acceptance, E,TB,D, VU,NR by CB at 8/30/2022 1527   Family Acceptance, E, VU,DU,NR by SM at 9/6/2022 1113    Acceptance, E, NR by AR at 9/4/2022 1656    Acceptance, E, NR by AR at 9/3/2022 1658   Significant Other Acceptance, E,TB,D, VU,DU by CB at 9/7/2022 1153                               User Key     Initials Effective Dates Name Provider Type Discipline    EM 06/16/21 -  Sonam Bauer, PT Physical Therapist PT    AR 06/16/21 -  Claire Maher, PT Physical Therapist PT    CB 10/22/21 -  Barbie Peña, PT Physical Therapist PT    SM 08/15/22 -  Arie Pradhan PT Student PT Student PT              PT Recommendation and Plan     Plan of Care Reviewed With: patient, spouse  Progress: improving  Outcome  Evaluation: Patient participated with PT this AM. He ambulated 250ft using rwx requiring SBA and ascended/descended 6 steps with handrails requiring SBA/CGA. Cues to maintain upright posture during ambulation. PT reviewed cardiac protocol, use of rwx at dc within home, use of BSC for toileting for higher surface, and sternal precuations. No further acute PT needs. Plan home with spouse and HHPT.     Time Calculation:    PT Charges     Row Name 09/07/22 1154             Time Calculation    Start Time 0917  -CB      Stop Time 0945  -CB      Time Calculation (min) 28 min  -CB      PT Received On 09/07/22  -CB              Time Calculation- PT    Total Timed Code Minutes- PT 28 minute(s)  -CB              Timed Charges    95018 - PT Therapeutic Activity Minutes 28  -CB              Total Minutes    Timed Charges Total Minutes 28  -CB       Total Minutes 28  -CB            User Key  (r) = Recorded By, (t) = Taken By, (c) = Cosigned By    Initials Name Provider Type    CB Barbie Peña, PT Physical Therapist              Therapy Charges for Today     Code Description Service Date Service Provider Modifiers Qty    [id number]  PT THERAPEUTIC ACT EA 15 MIN 9/7/2022 Barbie Peña, PT GP 2          PT G-Codes  Outcome Measure Options: AM-PAC 6 Clicks Basic Mobility (PT)  AM-PAC 6 Clicks Score (PT): 20    PT Discharge Summary  Anticipated Discharge Disposition (PT): home with home health, home with assist    Barbie Peña, MICAELA  9/7/2022

## 2022-09-07 NOTE — CASE MANAGEMENT/SOCIAL WORK
Continued Stay Note  [facility]     Patient Name: Chaitanya Montesinos  MRN: [id number]  Today's Date: 9/7/2022    Admit Date: 8/25/2022     Discharge Plan     Row Name 09/07/22 1443       Plan    Plan Precert started for [facility];    Plan Comments Per [facility]- [facility] initiated [facility] precert today.  [facility] accepted if Pt denied for rehab.  CCP following......Missy BRITO/[facility] CM               Discharge Codes    No documentation.               Expected Discharge Date and Time     Expected Discharge Date Expected Discharge Time    Sep 8, 2022             Missy Botello RN

## 2022-09-07 NOTE — PLAN OF CARE
Goal Outcome Evaluation:  Plan of Care Reviewed With: patient, spouse        Progress: improving  Outcome Evaluation: Patient participated with PT this AM. He ambulated 250ft using rwx requiring SBA and ascended/descended 6 steps with handrails requiring SBA/CGA. Cues to maintain upright posture during ambulation. PT reviewed cardiac protocol, use of rwx at dc within home, use of BSC for toileting for higher surface, and sternal precuations. No further acute PT needs. Plan home with spouse and HHPT.

## 2022-09-07 NOTE — PLAN OF CARE
Goal Outcome Evaluation:  Plan of Care Reviewed With: patient, daughter, spouse        Progress: improving       Patient s/p CABG x4 with no c/o pain at this time. Patient ambulating with standby assist and family/staff support to stand to his feet. Patient VSS B/P improved 140-150's systolic. Ileus improving and continues to have loose BM's. Plans to go home with HH or SNF near patient home. Will continue to monitor and prepare for discharge.

## 2022-09-07 NOTE — PLAN OF CARE
Goal Outcome Evaluation:   Up to chair for meals, ambulated in butler numerous times today; dali well. Now has gross hematuria; called urology and Dr. Contreras to see tomorrow.

## 2022-09-08 LAB
ANION GAP SERPL CALCULATED.3IONS-SCNC: 11 MMOL/L (ref 5–15)
ANION GAP SERPL CALCULATED.3IONS-SCNC: 9 MMOL/L (ref 5–15)
BASOPHILS # BLD AUTO: 0.05 10*3/MM3 (ref 0–0.2)
BASOPHILS NFR BLD AUTO: 0.5 % (ref 0–1.5)
BUN SERPL-MCNC: 11 MG/DL (ref 8–23)
BUN SERPL-MCNC: 13 MG/DL (ref 8–23)
BUN/CREAT SERPL: 12.2 (ref 7–25)
BUN/CREAT SERPL: 12.3 (ref 7–25)
CALCIUM SPEC-SCNC: 8.1 MG/DL (ref 8.6–10.5)
CALCIUM SPEC-SCNC: 8.8 MG/DL (ref 8.6–10.5)
CHLORIDE SERPL-SCNC: 101 MMOL/L (ref 98–107)
CHLORIDE SERPL-SCNC: 103 MMOL/L (ref 98–107)
CO2 SERPL-SCNC: 27 MMOL/L (ref 22–29)
CO2 SERPL-SCNC: 28 MMOL/L (ref 22–29)
CREAT SERPL-MCNC: 0.9 MG/DL (ref 0.76–1.27)
CREAT SERPL-MCNC: 1.06 MG/DL (ref 0.76–1.27)
DEPRECATED RDW RBC AUTO: 46.2 FL (ref 37–54)
EGFRCR SERPLBLD CKD-EPI 2021: 71.4 ML/MIN/1.73
EGFRCR SERPLBLD CKD-EPI 2021: 86.9 ML/MIN/1.73
EOSINOPHIL # BLD AUTO: 0.49 10*3/MM3 (ref 0–0.4)
EOSINOPHIL NFR BLD AUTO: 4.5 % (ref 0.3–6.2)
ERYTHROCYTE [DISTWIDTH] IN BLOOD BY AUTOMATED COUNT: 13.3 % (ref 12.3–15.4)
GLUCOSE SERPL-MCNC: 111 MG/DL (ref 65–99)
GLUCOSE SERPL-MCNC: 130 MG/DL (ref 65–99)
HCT VFR BLD AUTO: 34.4 % (ref 37.5–51)
HGB BLD-MCNC: 11.5 G/DL (ref 13–17.7)
IMM GRANULOCYTES # BLD AUTO: 0.22 10*3/MM3 (ref 0–0.05)
IMM GRANULOCYTES NFR BLD AUTO: 2 % (ref 0–0.5)
LYMPHOCYTES # BLD AUTO: 1.38 10*3/MM3 (ref 0.7–3.1)
LYMPHOCYTES NFR BLD AUTO: 12.7 % (ref 19.6–45.3)
MCH RBC QN AUTO: 31.8 PG (ref 26.6–33)
MCHC RBC AUTO-ENTMCNC: 33.4 G/DL (ref 31.5–35.7)
MCV RBC AUTO: 95 FL (ref 79–97)
MONOCYTES # BLD AUTO: 1.26 10*3/MM3 (ref 0.1–0.9)
MONOCYTES NFR BLD AUTO: 11.6 % (ref 5–12)
NEUTROPHILS NFR BLD AUTO: 68.7 % (ref 42.7–76)
NEUTROPHILS NFR BLD AUTO: 7.49 10*3/MM3 (ref 1.7–7)
NRBC BLD AUTO-RTO: 0 /100 WBC (ref 0–0.2)
PLATELET # BLD AUTO: 247 10*3/MM3 (ref 140–450)
PMV BLD AUTO: 9.6 FL (ref 6–12)
POTASSIUM SERPL-SCNC: 3.6 MMOL/L (ref 3.5–5.2)
POTASSIUM SERPL-SCNC: 3.7 MMOL/L (ref 3.5–5.2)
POTASSIUM SERPL-SCNC: 3.7 MMOL/L (ref 3.5–5.2)
RBC # BLD AUTO: 3.62 10*6/MM3 (ref 4.14–5.8)
SODIUM SERPL-SCNC: 139 MMOL/L (ref 136–145)
SODIUM SERPL-SCNC: 140 MMOL/L (ref 136–145)
WBC NRBC COR # BLD: 10.89 10*3/MM3 (ref 3.4–10.8)

## 2022-09-08 PROCEDURE — 94761 N-INVAS EAR/PLS OXIMETRY MLT: CPT

## 2022-09-08 PROCEDURE — 94799 UNLISTED PULMONARY SVC/PX: CPT

## 2022-09-08 PROCEDURE — 80048 BASIC METABOLIC PNL TOTAL CA: CPT | Performed by: NURSE PRACTITIONER

## 2022-09-08 PROCEDURE — 84132 ASSAY OF SERUM POTASSIUM: CPT | Performed by: THORACIC SURGERY (CARDIOTHORACIC VASCULAR SURGERY)

## 2022-09-08 PROCEDURE — 85025 COMPLETE CBC W/AUTO DIFF WBC: CPT | Performed by: PHYSICIAN ASSISTANT

## 2022-09-08 PROCEDURE — 94760 N-INVAS EAR/PLS OXIMETRY 1: CPT

## 2022-09-08 RX ORDER — ATENOLOL 50 MG/1
50 TABLET ORAL EVERY 12 HOURS SCHEDULED
Status: DISCONTINUED | OUTPATIENT
Start: 2022-09-08 | End: 2022-09-09 | Stop reason: HOSPADM

## 2022-09-08 RX ORDER — LOSARTAN POTASSIUM 100 MG/1
100 TABLET ORAL
Status: DISCONTINUED | OUTPATIENT
Start: 2022-09-08 | End: 2022-09-09 | Stop reason: HOSPADM

## 2022-09-08 RX ORDER — FINASTERIDE 5 MG/1
5 TABLET, FILM COATED ORAL DAILY
Status: DISCONTINUED | OUTPATIENT
Start: 2022-09-08 | End: 2022-09-09 | Stop reason: HOSPADM

## 2022-09-08 RX ORDER — SULFAMETHOXAZOLE AND TRIMETHOPRIM 800; 160 MG/1; MG/1
1 TABLET ORAL EVERY 12 HOURS SCHEDULED
Status: DISCONTINUED | OUTPATIENT
Start: 2022-09-08 | End: 2022-09-09 | Stop reason: HOSPADM

## 2022-09-08 RX ADMIN — ACETAMINOPHEN 650 MG: 325 TABLET, FILM COATED ORAL at 22:29

## 2022-09-08 RX ADMIN — ATORVASTATIN CALCIUM 10 MG: 20 TABLET, FILM COATED ORAL at 21:00

## 2022-09-08 RX ADMIN — POTASSIUM CHLORIDE 40 MEQ: 750 TABLET, EXTENDED RELEASE ORAL at 14:45

## 2022-09-08 RX ADMIN — ACETAMINOPHEN 650 MG: 325 TABLET, FILM COATED ORAL at 01:40

## 2022-09-08 RX ADMIN — SIMETHICONE 80 MG: 80 TABLET, CHEWABLE ORAL at 20:59

## 2022-09-08 RX ADMIN — ASPIRIN 81 MG: 81 TABLET, COATED ORAL at 09:21

## 2022-09-08 RX ADMIN — MONTELUKAST SODIUM 10 MG: 10 TABLET, FILM COATED ORAL at 20:59

## 2022-09-08 RX ADMIN — ATENOLOL 25 MG: 25 TABLET ORAL at 09:21

## 2022-09-08 RX ADMIN — SIMETHICONE 80 MG: 80 TABLET, CHEWABLE ORAL at 09:21

## 2022-09-08 RX ADMIN — TAMSULOSIN HYDROCHLORIDE 0.4 MG: 0.4 CAPSULE ORAL at 09:21

## 2022-09-08 RX ADMIN — LOSARTAN POTASSIUM 100 MG: 100 TABLET, FILM COATED ORAL at 09:21

## 2022-09-08 RX ADMIN — FINASTERIDE 5 MG: 5 TABLET, FILM COATED ORAL at 12:50

## 2022-09-08 RX ADMIN — SIMETHICONE 80 MG: 80 TABLET, CHEWABLE ORAL at 12:50

## 2022-09-08 RX ADMIN — BUMETANIDE 2 MG: 2 TABLET ORAL at 09:21

## 2022-09-08 RX ADMIN — SULFAMETHOXAZOLE AND TRIMETHOPRIM 1 TABLET: 800; 160 TABLET ORAL at 12:50

## 2022-09-08 RX ADMIN — CHLORHEXIDINE GLUCONATE 15 ML: 1.2 SOLUTION ORAL at 21:00

## 2022-09-08 RX ADMIN — PANTOPRAZOLE SODIUM 40 MG: 40 TABLET, DELAYED RELEASE ORAL at 06:37

## 2022-09-08 RX ADMIN — SIMETHICONE 80 MG: 80 TABLET, CHEWABLE ORAL at 16:50

## 2022-09-08 RX ADMIN — POTASSIUM CHLORIDE 40 MEQ: 750 TABLET, EXTENDED RELEASE ORAL at 09:20

## 2022-09-08 RX ADMIN — ATENOLOL 50 MG: 50 TABLET ORAL at 20:59

## 2022-09-08 RX ADMIN — MUPIROCIN 1 APPLICATION: 20 OINTMENT TOPICAL at 21:00

## 2022-09-08 RX ADMIN — SULFAMETHOXAZOLE AND TRIMETHOPRIM 1 TABLET: 800; 160 TABLET ORAL at 21:00

## 2022-09-08 NOTE — PROGRESS NOTES
"   LOS: 14 days   Patient Care Team:  Keely Lo APRN as PCP - General (Family Medicine)  Jeffrey Inman MD as Consulting Physician (Cardiology)      Subjective   Interval History: Re consulted due to gross hematuria overnight. Pt had intraoperative consult for difficult catheter placement. Terrazas was maintained for three days and discontinued. Had been urinating well since. Pt started on eliquis three days ago and overnight developed gross hematuria. No significant clots. Feels he is emptying. Will intermittently be less bloody. No dysuria, fevers, chills    Objective     ROS   12 POINT NEG ROS PERTINENT IN HPI      Vital Signs  Temp:  [97.1 °F (36.2 °C)-98.3 °F (36.8 °C)] 97.9 °F (36.6 °C)  Heart Rate:  [65-70] 67  Resp:  [18-20] 20  BP: (133-155)/(66-78) 144/72      Intake/Output Summary (Last 24 hours) at 9/8/2022 0952  Last data filed at 9/8/2022 0810  Gross per 24 hour   Intake 1930 ml   Output 3745 ml   Net -1815 ml       Flowsheet Rows    Flowsheet Row First Filed Value   Admission Height 195.6 cm (77\") Documented at 08/26/2022 1310   Admission Weight 130 kg (287 lb 3 oz) Documented at 08/26/2022 0315          Physical Exam:     General appearance: alert, cooperative, oriented  Lungs:  Clear, No acute distress  Heart:  Regular, rate and rhythm  ABD:  Soft nondistended, nontender  Genitalia: deferred  Extremities: normal, no edema  Skin:  Skin color, texture, turgor normal, no rashes        Results Review:     I reviewed the patient's new clinical results.  Lab Results (all)     Procedure Component Value Units Date/Time    Urine Culture - Urine, Urine, Clean Catch [[id number]]  (Abnormal) Collected: 09/07/22 1157    Specimen: Urine, Clean Catch Updated: 09/08/22 0704     Urine Culture 25,000 CFU/mL Gram Negative Bacilli    Narrative:      Colonization of the urinary tract without infection is common. Treatment is discouraged unless the patient is symptomatic, pregnant, or undergoing an invasive urologic " procedure.    Basic Metabolic Panel [[id number]]  (Abnormal) Collected: 09/08/22 0255    Specimen: Blood Updated: 09/08/22 0415     Glucose 111 mg/dL      BUN 11 mg/dL      Creatinine 0.90 mg/dL      Sodium 139 mmol/L      Potassium 3.6 mmol/L      Chloride 103 mmol/L      CO2 27.0 mmol/L      Calcium 8.1 mg/dL      BUN/Creatinine Ratio 12.2     Anion Gap 9.0 mmol/L      eGFR 86.9 mL/min/1.73      Comment: National Kidney Foundation and American Society of Nephrology (ASN) Task Force recommended calculation based on the Chronic Kidney Disease Epidemiology Collaboration (CKD-EPI) equation refit without adjustment for race.       Narrative:      GFR Normal >60  Chronic Kidney Disease <60  Kidney Failure <15      CBC & Differential [[id number]]  (Abnormal) Collected: 09/08/22 0255    Specimen: Blood Updated: 09/08/22 0330    Narrative:      The following orders were created for panel order CBC & Differential.  Procedure                               Abnormality         Status                     ---------                               -----------         ------                     CBC Auto Differential[[id number]]        Abnormal            Final result                 Please view results for these tests on the individual orders.    CBC Auto Differential [[id number]]  (Abnormal) Collected: 09/08/22 0255    Specimen: Blood Updated: 09/08/22 0330     WBC 10.89 10*3/mm3      RBC 3.62 10*6/mm3      Hemoglobin 11.5 g/dL      Hematocrit 34.4 %      MCV 95.0 fL      MCH 31.8 pg      MCHC 33.4 g/dL      RDW 13.3 %      RDW-SD 46.2 fl      MPV 9.6 fL      Platelets 247 10*3/mm3      Neutrophil % 68.7 %      Lymphocyte % 12.7 %      Monocyte % 11.6 %      Eosinophil % 4.5 %      Basophil % 0.5 %      Immature Grans % 2.0 %      Neutrophils, Absolute 7.49 10*3/mm3      Lymphocytes, Absolute 1.38 10*3/mm3      Monocytes, Absolute 1.26 10*3/mm3      Eosinophils, Absolute 0.49 10*3/mm3      Basophils, Absolute 0.05 10*3/mm3       Immature Grans, Absolute 0.22 10*3/mm3      nRBC 0.0 /100 WBC     Urinalysis, Microscopic Only - Urine, Clean Catch [[id number]]  (Abnormal) Collected: 09/07/22 1157    Specimen: Urine, Clean Catch Updated: 09/07/22 1330     RBC, UA Too Numerous to Count /HPF      WBC, UA 6-12 /HPF      Bacteria, UA None Seen /HPF      Squamous Epithelial Cells, UA 0-2 /HPF      Hyaline Casts, UA 0-2 /LPF      Methodology Automated Microscopy    Urinalysis With Culture If Indicated - Urine, Clean Catch [[id number]]  (Abnormal) Collected: 09/07/22 1157    Specimen: Urine, Clean Catch Updated: 09/07/22 1330     Color, UA Red     Comment: Any Substance that causes an abnormal urine color can alter the accuracy of the chemical reactions.        Appearance, UA Cloudy     pH, UA 5.5     Specific Gravity, UA 1.009     Glucose, UA Negative     Ketones, UA Negative     Bilirubin, UA Negative     Blood, UA Large (3+)     Protein,  mg/dL (2+)     Leuk Esterase, UA Trace     Nitrite, UA Negative     Urobilinogen, UA 0.2 E.U./dL    Narrative:      In absence of clinical symptoms, the presence of pyuria, bacteria, and/or nitrites on the urinalysis result does not correlate with infection.    CBC & Differential [[id number]]  (Abnormal) Collected: 09/07/22 0314    Specimen: Blood Updated: 09/07/22 0426    Narrative:      The following orders were created for panel order CBC & Differential.  Procedure                               Abnormality         Status                     ---------                               -----------         ------                     CBC Auto Differential[[id number]]        Abnormal            Final result                 Please view results for these tests on the individual orders.    CBC Auto Differential [[id number]]  (Abnormal) Collected: 09/07/22 0314    Specimen: Blood Updated: 09/07/22 0426     WBC 12.30 10*3/mm3      RBC 3.81 10*6/mm3      Hemoglobin 11.8 g/dL      Hematocrit 34.7 %      MCV 91.1 fL      MCH  31.0 pg      MCHC 34.0 g/dL      RDW 13.0 %      RDW-SD 43.5 fl      MPV 9.9 fL      Platelets 308 10*3/mm3      Neutrophil % 64.3 %      Lymphocyte % 15.3 %      Monocyte % 11.1 %      Eosinophil % 6.0 %      Basophil % 0.7 %      Immature Grans % 2.6 %      Neutrophils, Absolute 7.91 10*3/mm3      Lymphocytes, Absolute 1.88 10*3/mm3      Monocytes, Absolute 1.36 10*3/mm3      Eosinophils, Absolute 0.74 10*3/mm3      Basophils, Absolute 0.09 10*3/mm3      Immature Grans, Absolute 0.32 10*3/mm3      nRBC 0.0 /100 WBC     POC Glucose Once [[id number]]  (Abnormal) Collected: 09/06/22 2138    Specimen: Blood Updated: 09/06/22 2139     Glucose 132 mg/dL      Comment: Meter: [id number] : [id number] Fidel ABDI       POC Glucose Once [[id number]]  (Normal) Collected: 09/06/22 1053    Specimen: Blood Updated: 09/06/22 1054     Glucose 107 mg/dL      Comment: Meter: [id number] : [id number] Hermes EVERETT       POC Glucose Once [[id number]]  (Normal) Collected: 09/06/22 0551    Specimen: Blood Updated: 09/06/22 0555     Glucose 98 mg/dL      Comment: Meter: [id number] : [id number] Han Guthrie CNA       Comprehensive Metabolic Panel [[id number]]  (Abnormal) Collected: 09/06/22 0306    Specimen: Blood Updated: 09/06/22 0515     Glucose 112 mg/dL      BUN 19 mg/dL      Creatinine 1.03 mg/dL      Sodium 141 mmol/L      Potassium 3.8 mmol/L      Comment: Slight hemolysis detected by analyzer. Results may be affected.        Chloride 110 mmol/L      CO2 19.7 mmol/L      Calcium 8.1 mg/dL      Total Protein 5.5 g/dL      Albumin 2.80 g/dL      ALT (SGPT) 33 U/L      AST (SGOT) 38 U/L      Alkaline Phosphatase 59 U/L      Total Bilirubin 0.3 mg/dL      Globulin 2.7 gm/dL      A/G Ratio 1.0 g/dL      BUN/Creatinine Ratio 18.4     Anion Gap 11.3 mmol/L      eGFR 73.9 mL/min/1.73      Comment: National Kidney Foundation and American Society of Nephrology (ASN) Task Force recommended calculation based on the Chronic  Kidney Disease Epidemiology Collaboration (CKD-EPI) equation refit without adjustment for race.       Narrative:      GFR Normal >60  Chronic Kidney Disease <60  Kidney Failure <15      CBC & Differential [[id number]]  (Abnormal) Collected: 09/06/22 0306    Specimen: Blood Updated: 09/06/22 0352    Narrative:      The following orders were created for panel order CBC & Differential.  Procedure                               Abnormality         Status                     ---------                               -----------         ------                     CBC Auto Differential[[id number]]        Abnormal            Final result                 Please view results for these tests on the individual orders.    CBC Auto Differential [[id number]]  (Abnormal) Collected: 09/06/22 0306    Specimen: Blood Updated: 09/06/22 0352     WBC 10.59 10*3/mm3      RBC 3.49 10*6/mm3      Hemoglobin 11.3 g/dL      Hematocrit 33.8 %      MCV 96.8 fL      MCH 32.4 pg      MCHC 33.4 g/dL      RDW 13.0 %      RDW-SD 45.3 fl      MPV 9.4 fL      Platelets 283 10*3/mm3      Neutrophil % 61.5 %      Lymphocyte % 16.2 %      Monocyte % 11.8 %      Eosinophil % 5.6 %      Basophil % 0.9 %      Immature Grans % 4.0 %      Neutrophils, Absolute 6.51 10*3/mm3      Lymphocytes, Absolute 1.72 10*3/mm3      Monocytes, Absolute 1.25 10*3/mm3      Eosinophils, Absolute 0.59 10*3/mm3      Basophils, Absolute 0.10 10*3/mm3      Immature Grans, Absolute 0.42 10*3/mm3      nRBC 0.0 /100 WBC     POC Glucose Once [[id number]]  (Normal) Collected: 09/05/22 1959    Specimen: Blood Updated: 09/05/22 2001     Glucose 111 mg/dL      Comment: Meter: [id number] : [id number] Han Guthrie CNA       POC Glucose Once [[id number]]  (Abnormal) Collected: 09/05/22 1535    Specimen: Blood Updated: 09/05/22 1536     Glucose 170 mg/dL      Comment: Meter: [id number] : [id number] Anali ABDI       POC Glucose Once [[id number]]  (Normal) Collected: 09/05/22 1041     Specimen: Blood Updated: 09/05/22 1042     Glucose 128 mg/dL      Comment: Meter: [id number] : [id number] Anali ABDI       POC Glucose Once [[id number]]  (Normal) Collected: 09/05/22 0534    Specimen: Blood Updated: 09/05/22 0536     Glucose 115 mg/dL      Comment: Meter: [id number] : [id number] Devaughn ABDI       CBC & Differential [[id number]]  (Abnormal) Collected: 09/05/22 0329    Specimen: Blood Updated: 09/05/22 0348    Narrative:      The following orders were created for panel order CBC & Differential.  Procedure                               Abnormality         Status                     ---------                               -----------         ------                     CBC Auto Differential[[id number]]        Abnormal            Final result                 Please view results for these tests on the individual orders.    CBC Auto Differential [[id number]]  (Abnormal) Collected: 09/05/22 0329    Specimen: Blood Updated: 09/05/22 0348     WBC 9.75 10*3/mm3      RBC 3.50 10*6/mm3      Hemoglobin 11.2 g/dL      Hematocrit 33.7 %      MCV 96.3 fL      MCH 32.0 pg      MCHC 33.2 g/dL      RDW 13.4 %      RDW-SD 47.1 fl      MPV 9.9 fL      Platelets 303 10*3/mm3      Neutrophil % 58.5 %      Lymphocyte % 17.1 %      Monocyte % 12.5 %      Eosinophil % 6.4 %      Basophil % 0.7 %      Immature Grans % 4.8 %      Neutrophils, Absolute 5.70 10*3/mm3      Lymphocytes, Absolute 1.67 10*3/mm3      Monocytes, Absolute 1.22 10*3/mm3      Eosinophils, Absolute 0.62 10*3/mm3      Basophils, Absolute 0.07 10*3/mm3      Immature Grans, Absolute 0.47 10*3/mm3      nRBC 0.1 /100 WBC     Potassium [[id number]]  (Normal) Collected: 09/04/22 2008    Specimen: Blood Updated: 09/04/22 2048     Potassium 4.2 mmol/L     POC Glucose Once [[id number]]  (Abnormal) Collected: 09/04/22 2026    Specimen: Blood Updated: 09/04/22 2028     Glucose 132 mg/dL      Comment: Meter: [id number] : [id number] Devaughn ABDI        POC Glucose Once [[id number]]  (Normal) Collected: 09/04/22 1557    Specimen: Blood Updated: 09/04/22 1557     Glucose 110 mg/dL      Comment: Meter: [id number] : [id number] Kyleerin Martinez JIN       POC Glucose Once [[id number]]  (Normal) Collected: 09/04/22 1106    Specimen: Blood Updated: 09/04/22 1108     Glucose 118 mg/dL      Comment: Meter: [id number] : [id number] Kyleerin Martinez JIN       Magnesium [[id number]]  (Abnormal) Collected: 09/04/22 0845    Specimen: Blood Updated: 09/04/22 0903     Magnesium 2.5 mg/dL     POC Glucose Once [[id number]]  (Normal) Collected: 09/04/22 0544    Specimen: Blood Updated: 09/04/22 0545     Glucose 123 mg/dL      Comment: Meter: [id number] : [id number] Devaughn ABDI       Renal Function Panel [[id number]]  (Abnormal) Collected: 09/04/22 0306    Specimen: Blood Updated: 09/04/22 0415     Glucose 116 mg/dL      BUN 42 mg/dL      Creatinine 1.02 mg/dL      Sodium 139 mmol/L      Potassium 3.4 mmol/L      Chloride 108 mmol/L      CO2 22.0 mmol/L      Calcium 8.7 mg/dL      Albumin 2.90 g/dL      Phosphorus 2.0 mg/dL      Anion Gap 9.0 mmol/L      BUN/Creatinine Ratio 41.2     eGFR 74.8 mL/min/1.73      Comment: National Kidney Foundation and American Society of Nephrology (ASN) Task Force recommended calculation based on the Chronic Kidney Disease Epidemiology Collaboration (CKD-EPI) equation refit without adjustment for race.       Narrative:      GFR Normal >60  Chronic Kidney Disease <60  Kidney Failure <15      CBC & Differential [[id number]]  (Abnormal) Collected: 09/04/22 0306    Specimen: Blood Updated: 09/04/22 0354    Narrative:      The following orders were created for panel order CBC & Differential.  Procedure                               Abnormality         Status                     ---------                               -----------         ------                     CBC Auto Differential[[id number]]        Abnormal            Final result                  Please view results for these tests on the individual orders.    CBC Auto Differential [[id number]]  (Abnormal) Collected: 09/04/22 0306    Specimen: Blood Updated: 09/04/22 0354     WBC 8.65 10*3/mm3      RBC 3.44 10*6/mm3      Hemoglobin 10.9 g/dL      Hematocrit 32.4 %      MCV 94.2 fL      MCH 31.7 pg      MCHC 33.6 g/dL      RDW 12.7 %      RDW-SD 43.8 fl      MPV 9.8 fL      Platelets 318 10*3/mm3      Neutrophil % 63.0 %      Lymphocyte % 14.0 %      Monocyte % 12.5 %      Eosinophil % 6.1 %      Basophil % 0.7 %      Immature Grans % 3.7 %      Neutrophils, Absolute 5.45 10*3/mm3      Lymphocytes, Absolute 1.21 10*3/mm3      Monocytes, Absolute 1.08 10*3/mm3      Eosinophils, Absolute 0.53 10*3/mm3      Basophils, Absolute 0.06 10*3/mm3      Immature Grans, Absolute 0.32 10*3/mm3      nRBC 0.0 /100 WBC     POC Glucose Once [[id number]]  (Abnormal) Collected: 09/03/22 2155    Specimen: Blood Updated: 09/03/22 2156     Glucose 135 mg/dL      Comment: Meter: [id number] : [id number] Devaughn ABDI       POC Glucose Once [[id number]]  (Normal) Collected: 09/03/22 1600    Specimen: Blood Updated: 09/03/22 1601     Glucose 121 mg/dL      Comment: Meter: [id number] : [id number] Galvan Jillian NA       CBC & Differential [[id number]]  (Abnormal) Collected: 09/03/22 1133    Specimen: Blood Updated: 09/03/22 1203    Narrative:      The following orders were created for panel order CBC & Differential.  Procedure                               Abnormality         Status                     ---------                               -----------         ------                     CBC Auto Differential[[id number]]        Abnormal            Final result                 Please view results for these tests on the individual orders.    CBC Auto Differential [[id number]]  (Abnormal) Collected: 09/03/22 1133    Specimen: Blood Updated: 09/03/22 1203     WBC 11.40 10*3/mm3      RBC 3.54 10*6/mm3      Hemoglobin 11.4 g/dL       Hematocrit 34.7 %      MCV 98.0 fL      MCH 32.2 pg      MCHC 32.9 g/dL      RDW 13.2 %      RDW-SD 48.1 fl      MPV 9.2 fL      Platelets 318 10*3/mm3      Neutrophil % 73.8 %      Lymphocyte % 8.2 %      Monocyte % 9.7 %      Eosinophil % 6.1 %      Basophil % 0.4 %      Immature Grans % 1.8 %      Neutrophils, Absolute 8.41 10*3/mm3      Lymphocytes, Absolute 0.94 10*3/mm3      Monocytes, Absolute 1.11 10*3/mm3      Eosinophils, Absolute 0.69 10*3/mm3      Basophils, Absolute 0.05 10*3/mm3      Immature Grans, Absolute 0.20 10*3/mm3      nRBC 0.0 /100 WBC     POC Glucose Once [[id number]]  (Abnormal) Collected: 09/03/22 1050    Specimen: Blood Updated: 09/03/22 1058     Glucose 140 mg/dL      Comment: Meter: [id number] : [id number] Galvan Jillian NA       POC Glucose Once [[id number]]  (Normal) Collected: 09/03/22 0601    Specimen: Blood Updated: 09/03/22 0602     Glucose 107 mg/dL      Comment: Meter: [id number] : [id number] Han Guthrie CNA       Urine Culture - Urine, Urine, Clean Catch [[id number]]  (Normal) Collected: 09/01/22 2124    Specimen: Urine, Clean Catch Updated: 09/03/22 0111     Urine Culture No growth    POC Glucose Once [[id number]]  (Abnormal) Collected: 09/02/22 2058    Specimen: Blood Updated: 09/02/22 2059     Glucose 138 mg/dL      Comment: Meter: [id number] : [id number] Edds Jerri CNA       POC Glucose Once [[id number]]  (Normal) Collected: 09/02/22 1608    Specimen: Blood Updated: 09/02/22 1619     Glucose 111 mg/dL      Comment: Meter: [id number] : [id number] Manzo Glenroy NA       POC Glucose Once [[id number]]  (Normal) Collected: 09/02/22 1122    Specimen: Blood Updated: 09/02/22 1127     Glucose 106 mg/dL      Comment: Meter: [id number] : [id number] Manzo Glenroy NA       Magnesium [[id number]]  (Abnormal) Collected: 09/02/22 0904    Specimen: Blood Updated: 09/02/22 0947     Magnesium 3.1 mg/dL     POC Glucose Once [[id number]]  (Normal) Collected: 09/02/22 0549     Specimen: Blood Updated: 09/02/22 0551     Glucose 101 mg/dL      Comment: Meter: [id number] : [id number] Devaughn ABDI       Magnesium [[id number]]  (Abnormal) Collected: 09/02/22 0331    Specimen: Blood Updated: 09/02/22 0540     Magnesium 2.9 mg/dL     Comprehensive Metabolic Panel [[id number]]  (Abnormal) Collected: 09/02/22 0331    Specimen: Blood Updated: 09/02/22 0453     Glucose 110 mg/dL      BUN 80 mg/dL      Creatinine 1.57 mg/dL      Sodium 140 mmol/L      Potassium 3.5 mmol/L      Chloride 109 mmol/L      CO2 20.0 mmol/L      Calcium 8.4 mg/dL      Total Protein 5.6 g/dL      Albumin 2.90 g/dL      ALT (SGPT) 9 U/L      AST (SGOT) 22 U/L      Alkaline Phosphatase 65 U/L      Total Bilirubin 0.5 mg/dL      Globulin 2.7 gm/dL      A/G Ratio 1.1 g/dL      BUN/Creatinine Ratio 51.0     Anion Gap 11.0 mmol/L      eGFR 44.6 mL/min/1.73      Comment: National Kidney Foundation and American Society of Nephrology (ASN) Task Force recommended calculation based on the Chronic Kidney Disease Epidemiology Collaboration (CKD-EPI) equation refit without adjustment for race.       Narrative:      GFR Normal >60  Chronic Kidney Disease <60  Kidney Failure <15      CBC (No Diff) [[id number]]  (Abnormal) Collected: 09/02/22 0331    Specimen: Blood Updated: 09/02/22 0402     WBC 14.84 10*3/mm3      RBC 3.72 10*6/mm3      Hemoglobin 11.6 g/dL      Hematocrit 33.5 %      MCV 90.1 fL      MCH 31.2 pg      MCHC 34.6 g/dL      RDW 12.8 %      RDW-SD 42.2 fl      MPV 9.8 fL      Platelets 306 10*3/mm3     Urinalysis, Microscopic Only - Urine, Clean Catch [[id number]]  (Abnormal) Collected: 09/01/22 2124    Specimen: Urine, Clean Catch Updated: 09/01/22 2308     RBC, UA 6-12 /HPF      WBC, UA 6-12 /HPF      Bacteria, UA None Seen /HPF      Squamous Epithelial Cells, UA 0-2 /HPF      Hyaline Casts, UA 0-2 /LPF      Methodology Automated Microscopy    Urinalysis With Culture If Indicated - Urine, Clean Catch [[id number]]   (Abnormal) Collected: 09/01/22 2124    Specimen: Urine, Clean Catch Updated: 09/01/22 2308     Color, UA Yellow     Appearance, UA Clear     pH, UA 6.0     Specific Gravity, UA 1.023     Glucose, UA Negative     Ketones, UA Negative     Bilirubin, UA Negative     Blood, UA Trace     Protein, UA 30 mg/dL (1+)     Leuk Esterase, UA Negative     Nitrite, UA Negative     Urobilinogen, UA 1.0 E.U./dL    Narrative:      In absence of clinical symptoms, the presence of pyuria, bacteria, and/or nitrites on the urinalysis result does not correlate with infection.    POC Glucose Once [[id number]]  (Normal) Collected: 09/01/22 2104    Specimen: Blood Updated: 09/01/22 2105     Glucose 109 mg/dL      Comment: Meter: [id number] : [id number] Devaughn ABDI       POC Glucose Once [[id number]]  (Abnormal) Collected: 09/01/22 1212    Specimen: Blood Updated: 09/01/22 1223     Glucose 150 mg/dL      Comment: Meter: [id number] : [id number] Jovany ABDI       Comprehensive Metabolic Panel [[id number]]  (Abnormal) Collected: 09/01/22 0316    Specimen: Blood Updated: 09/01/22 0414     Glucose 145 mg/dL      BUN 89 mg/dL      Creatinine 2.17 mg/dL      Sodium 133 mmol/L      Potassium 3.4 mmol/L      Chloride 102 mmol/L      CO2 17.9 mmol/L      Calcium 9.0 mg/dL      Total Protein 6.1 g/dL      Albumin 3.30 g/dL      ALT (SGPT) 9 U/L      AST (SGOT) 16 U/L      Alkaline Phosphatase 80 U/L      Total Bilirubin 0.6 mg/dL      Globulin 2.8 gm/dL      A/G Ratio 1.2 g/dL      BUN/Creatinine Ratio 41.0     Anion Gap 13.1 mmol/L      eGFR 30.2 mL/min/1.73      Comment: National Kidney Foundation and American Society of Nephrology (ASN) Task Force recommended calculation based on the Chronic Kidney Disease Epidemiology Collaboration (CKD-EPI) equation refit without adjustment for race.       Narrative:      GFR Normal >60  Chronic Kidney Disease <60  Kidney Failure <15      Phosphorus [[id number]]  (Normal) Collected: 09/01/22 0316     Specimen: Blood Updated: 09/01/22 0410     Phosphorus 4.4 mg/dL     Magnesium [[id number]]  (Abnormal) Collected: 09/01/22 0316    Specimen: Blood Updated: 09/01/22 0410     Magnesium 3.0 mg/dL     CBC (No Diff) [[id number]]  (Abnormal) Collected: 09/01/22 0316    Specimen: Blood Updated: 09/01/22 0354     WBC 19.05 10*3/mm3      RBC 3.77 10*6/mm3      Hemoglobin 11.8 g/dL      Hematocrit 35.0 %      MCV 92.8 fL      MCH 31.3 pg      MCHC 33.7 g/dL      RDW 13.1 %      RDW-SD 44.1 fl      MPV 10.2 fL      Platelets 327 10*3/mm3     POC Glucose Once [[id number]]  (Normal) Collected: 08/31/22 2019    Specimen: Blood Updated: 08/31/22 2020     Glucose 126 mg/dL      Comment: Meter: [id number] : [id number] Shilpa YANEZ       POC Glucose Once [[id number]]  (Abnormal) Collected: 08/31/22 1607    Specimen: Blood Updated: 08/31/22 1609     Glucose 171 mg/dL      Comment: Meter: [id number] : [id number] Martin ABDI       CBC (No Diff) [[id number]]  (Abnormal) Collected: 08/31/22 1125    Specimen: Blood Updated: 08/31/22 1158     WBC 19.18 10*3/mm3      RBC 3.88 10*6/mm3      Hemoglobin 12.7 g/dL      Hematocrit 35.8 %      MCV 92.3 fL      MCH 32.7 pg      MCHC 35.5 g/dL      RDW 13.0 %      RDW-SD 42.5 fl      MPV 10.2 fL      Platelets 308 10*3/mm3     POC Glucose Once [[id number]]  (Abnormal) Collected: 08/31/22 1104    Specimen: Blood Updated: 08/31/22 1106     Glucose 157 mg/dL      Comment: Meter: [id number] : [id number] Cole ABDI       Sodium, Urine, Random - [[id number]] Collected: 08/31/22 0705    Specimen: Urine Updated: 08/31/22 0748     Sodium, Urine <20 mmol/L     Narrative:      Reference intervals for random urine have not been established.  Clinical usage is dependent upon physician's interpretation in combination with other laboratory tests.       Creatinine, Urine, Random - [[id number]] Collected: 08/31/22 0705    Specimen: Urine Updated: 08/31/22 0744     Creatinine, Urine 158.6  mg/dL     Narrative:      Reference intervals for random urine have not been established.  Clinical usage is dependent upon physician's interpretation in combination with other laboratory tests.       Osmolality, Urine - Urine, Clean Catch [[id number]] Collected: 08/31/22 0706    Specimen: Urine, Clean Catch Updated: 08/31/22 0743     Osmolality, Urine 545 mOsm/kg     Narrative:      Osmo Normal Reference Ranges:    Random:  mOsm/kg H2O, depending on fluid intake.  Random: >850 mOsm/kg H20, after 12 hour fluid restriction.    24 Hour: 300-900 mOsm/kg H2O.    Magnesium [[id number]]  (Abnormal) Collected: 08/31/22 0414    Specimen: Blood Updated: 08/31/22 0719     Magnesium 2.8 mg/dL     POC Glucose Once [[id number]]  (Normal) Collected: 08/31/22 0626    Specimen: Blood Updated: 08/31/22 0628     Glucose 125 mg/dL      Comment: Meter: [id number] : [id number] Jed EVERETT       Renal Function Panel [[id number]]  (Abnormal) Collected: 08/31/22 0414    Specimen: Blood Updated: 08/31/22 0509     Glucose 141 mg/dL      BUN 73 mg/dL      Creatinine 1.82 mg/dL      Sodium 131 mmol/L      Potassium 3.5 mmol/L      Chloride 100 mmol/L      CO2 20.7 mmol/L      Calcium 8.6 mg/dL      Albumin 3.30 g/dL      Phosphorus 4.0 mg/dL      Anion Gap 10.3 mmol/L      BUN/Creatinine Ratio 40.1     eGFR 37.3 mL/min/1.73      Comment: National Kidney Foundation and American Society of Nephrology (ASN) Task Force recommended calculation based on the Chronic Kidney Disease Epidemiology Collaboration (CKD-EPI) equation refit without adjustment for race.       Narrative:      GFR Normal >60  Chronic Kidney Disease <60  Kidney Failure <15      Uric Acid [[id number]]  (Abnormal) Collected: 08/31/22 0414    Specimen: Blood Updated: 08/31/22 0506     Uric Acid 7.9 mg/dL     POC Glucose Once [[id number]]  (Normal) Collected: 08/30/22 2047    Specimen: Blood Updated: 08/30/22 2048     Glucose 129 mg/dL      Comment: Meter: [id number]  : [id number] Fidel ABDI       POC Glucose Once [[id number]]  (Abnormal) Collected: 08/30/22 1554    Specimen: Blood Updated: 08/30/22 1609     Glucose 142 mg/dL      Comment: Meter: [id number] : [id number] Wally YouSciencea MARGUERITE       POC Glucose Once [[id number]]  (Abnormal) Collected: 08/30/22 1058    Specimen: Blood Updated: 08/30/22 1122     Glucose 144 mg/dL      Comment: Meter: [id number] : [id number] Wally Tamatha MARGUERITE       POC Glucose Once [[id number]]  (Abnormal) Collected: 08/30/22 0558    Specimen: Blood Updated: 08/30/22 0559     Glucose 133 mg/dL      Comment: Meter: [id number] : [id number] Fidel ABDI       Basic Metabolic Panel [[id number]]  (Abnormal) Collected: 08/30/22 0454    Specimen: Blood Updated: 08/30/22 0555     Glucose 132 mg/dL      BUN 60 mg/dL      Creatinine 1.98 mg/dL      Sodium 129 mmol/L      Potassium 3.6 mmol/L      Chloride 95 mmol/L      CO2 24.1 mmol/L      Calcium 8.8 mg/dL      BUN/Creatinine Ratio 30.3     Anion Gap 9.9 mmol/L      eGFR 33.7 mL/min/1.73      Comment: National Kidney Foundation and American Society of Nephrology (ASN) Task Force recommended calculation based on the Chronic Kidney Disease Epidemiology Collaboration (CKD-EPI) equation refit without adjustment for race.       Narrative:      GFR Normal >60  Chronic Kidney Disease <60  Kidney Failure <15      CBC (No Diff) [[id number]]  (Abnormal) Collected: 08/30/22 0454    Specimen: Blood Updated: 08/30/22 0530     WBC 17.37 10*3/mm3      RBC 3.95 10*6/mm3      Hemoglobin 12.7 g/dL      Hematocrit 37.2 %      MCV 94.2 fL      MCH 32.2 pg      MCHC 34.1 g/dL      RDW 12.8 %      RDW-SD 43.9 fl      MPV 10.6 fL      Platelets 269 10*3/mm3     POC Glucose Once [[id number]]  (Abnormal) Collected: 08/29/22 2300    Specimen: Blood Updated: 08/29/22 2301     Glucose 133 mg/dL      Comment: Meter: [id number] : [id number] Fidel ABDI       POC Glucose Once [[id number]]  (Abnormal) Collected:  08/29/22 1546    Specimen: Blood Updated: 08/29/22 1555     Glucose 157 mg/dL      Comment: Meter: [id number] : [id number] Garcia Laikesha PCA       POC Glucose Once [[id number]]  (Abnormal) Collected: 08/29/22 1046    Specimen: Blood Updated: 08/29/22 1048     Glucose 151 mg/dL      Comment: Meter: [id number] : [id number] Garcia Laikesha PCA       Basic Metabolic Panel [[id number]]  (Abnormal) Collected: 08/29/22 0322    Specimen: Blood Updated: 08/29/22 0434     Glucose 119 mg/dL      BUN 48 mg/dL      Creatinine 1.76 mg/dL      Sodium 134 mmol/L      Potassium 4.1 mmol/L      Chloride 101 mmol/L      CO2 23.5 mmol/L      Calcium 8.6 mg/dL      BUN/Creatinine Ratio 27.3     Anion Gap 9.5 mmol/L      eGFR 38.8 mL/min/1.73      Comment: National Kidney Foundation and American Society of Nephrology (ASN) Task Force recommended calculation based on the Chronic Kidney Disease Epidemiology Collaboration (CKD-EPI) equation refit without adjustment for race.       Narrative:      GFR Normal >60  Chronic Kidney Disease <60  Kidney Failure <15      CBC (No Diff) [[id number]]  (Abnormal) Collected: 08/29/22 0322    Specimen: Blood Updated: 08/29/22 0409     WBC 13.60 10*3/mm3      RBC 3.55 10*6/mm3      Hemoglobin 11.3 g/dL      Hematocrit 32.5 %      MCV 91.5 fL      MCH 31.8 pg      MCHC 34.8 g/dL      RDW 12.8 %      RDW-SD 43.2 fl      MPV 10.8 fL      Platelets 168 10*3/mm3     POC Glucose Once [[id number]]  (Abnormal) Collected: 08/28/22 2046    Specimen: Blood Updated: 08/28/22 2102     Glucose 132 mg/dL      Comment: Meter: [id number] : [id number] Jacek ABDI       POC Glucose Once [[id number]]  (Abnormal) Collected: 08/28/22 1600    Specimen: Blood Updated: 08/28/22 1601     Glucose 135 mg/dL      Comment: Meter: [id number] : [id number] Narayan ABDI       POC Glucose Once [[id number]]  (Abnormal) Collected: 08/28/22 1044    Specimen: Blood Updated: 08/28/22 1045     Glucose 142 mg/dL      Comment:  Meter: [id number] : [id number] Narayan Hopkins JIN       POC Glucose Once [[id number]]  (Normal) Collected: 08/28/22 0650    Specimen: Blood Updated: 08/28/22 0651     Glucose 128 mg/dL      Comment: Meter: [id number] : [id number] Kennedy Lynnette ABDI       Basic Metabolic Panel [[id number]]  (Abnormal) Collected: 08/28/22 0217    Specimen: Blood Updated: 08/28/22 0253     Glucose 129 mg/dL      BUN 34 mg/dL      Creatinine 1.97 mg/dL      Sodium 138 mmol/L      Potassium 4.2 mmol/L      Chloride 105 mmol/L      CO2 23.5 mmol/L      Calcium 8.3 mg/dL      BUN/Creatinine Ratio 17.3     Anion Gap 9.5 mmol/L      eGFR 33.9 mL/min/1.73      Comment: National Kidney Foundation and American Society of Nephrology (ASN) Task Force recommended calculation based on the Chronic Kidney Disease Epidemiology Collaboration (CKD-EPI) equation refit without adjustment for race.       Narrative:      GFR Normal >60  Chronic Kidney Disease <60  Kidney Failure <15      CBC (No Diff) [[id number]]  (Abnormal) Collected: 08/28/22 0217    Specimen: Blood Updated: 08/28/22 0228     WBC 15.04 10*3/mm3      RBC 3.56 10*6/mm3      Hemoglobin 11.4 g/dL      Hematocrit 32.7 %      MCV 91.9 fL      MCH 32.0 pg      MCHC 34.9 g/dL      RDW 12.8 %      RDW-SD 42.6 fl      MPV 10.4 fL      Platelets 128 10*3/mm3     POC Glucose Once [[id number]]  (Abnormal) Collected: 08/27/22 2104    Specimen: Blood Updated: 08/27/22 2105     Glucose 160 mg/dL      Comment: Meter: [id number] : [id number] Kennedy Oavicentesho ABDI       Urine Culture - Urine, Urine, Catheter [[id number]]  (Normal) Collected: 08/26/22 1240    Specimen: Urine, Catheter Updated: 08/27/22 2016     Urine Culture No growth    POC Glucose Once [[id number]]  (Abnormal) Collected: 08/27/22 1600    Specimen: Blood Updated: 08/27/22 1601     Glucose 146 mg/dL      Comment: Meter: [id number] : [id number] Mirza Sayra MARGUERITE       POC Glucose Once [[id number]]  (Abnormal) Collected: 08/27/22 124     Specimen: Blood Updated: 08/27/22 1245     Glucose 166 mg/dL      Comment: Meter: [id number] : [id number] Ousmane Mueller RN       POC Glucose Once [[id number]]  (Abnormal) Collected: 08/27/22 1052    Specimen: Blood Updated: 08/27/22 1054     Glucose 158 mg/dL      Comment: Meter: [id number] : [id number] Ousmane Mueller RN       Urine Culture - Urine, Urine, Clean Catch [[id number]]  (Normal) Collected: 08/26/22 0334    Specimen: Urine, Clean Catch Updated: 08/27/22 1044     Urine Culture No growth    Calcium, Ionized [[id number]]  (Normal) Collected: 08/27/22 0318    Specimen: Blood Updated: 08/27/22 0504     Ionized Calcium 1.27 mmol/L      Ionized Calcium 5.1 mg/dL     Protime-INR [[id number]]  (Abnormal) Collected: 08/27/22 0318    Specimen: Blood Updated: 08/27/22 0401     Protime 15.7 Seconds      INR 1.27    Renal Function Panel [[id number]]  (Abnormal) Collected: 08/27/22 0318    Specimen: Blood Updated: 08/27/22 0358     Glucose 173 mg/dL      BUN 21 mg/dL      Creatinine 1.38 mg/dL      Sodium 138 mmol/L      Potassium 4.2 mmol/L      Chloride 106 mmol/L      CO2 20.6 mmol/L      Calcium 8.7 mg/dL      Albumin 4.10 g/dL      Phosphorus 4.4 mg/dL      Anion Gap 11.4 mmol/L      BUN/Creatinine Ratio 15.2     eGFR 52.0 mL/min/1.73      Comment: National Kidney Foundation and American Society of Nephrology (ASN) Task Force recommended calculation based on the Chronic Kidney Disease Epidemiology Collaboration (CKD-EPI) equation refit without adjustment for race.       Narrative:      GFR Normal >60  Chronic Kidney Disease <60  Kidney Failure <15      Magnesium [[id number]]  (Normal) Collected: 08/27/22 0318    Specimen: Blood Updated: 08/27/22 0358     Magnesium 2.2 mg/dL     CBC & Differential [[id number]]  (Abnormal) Collected: 08/27/22 0318    Specimen: Blood Updated: 08/27/22 0339    Narrative:      The following orders were created for panel order CBC & Differential.  Procedure                                Abnormality         Status                     ---------                               -----------         ------                     CBC Auto Differential[[id number]]        Abnormal            Final result                 Please view results for these tests on the individual orders.    CBC Auto Differential [[id number]]  (Abnormal) Collected: 08/27/22 0318    Specimen: Blood Updated: 08/27/22 0339     WBC 17.98 10*3/mm3      RBC 4.09 10*6/mm3      Hemoglobin 12.8 g/dL      Hematocrit 38.4 %      MCV 93.9 fL      MCH 31.3 pg      MCHC 33.3 g/dL      RDW 13.1 %      RDW-SD 45.3 fl      MPV 10.2 fL      Platelets 185 10*3/mm3      Neutrophil % 85.5 %      Lymphocyte % 2.8 %      Monocyte % 11.0 %      Eosinophil % 0.0 %      Basophil % 0.2 %      Immature Grans % 0.5 %      Neutrophils, Absolute 15.38 10*3/mm3      Lymphocytes, Absolute 0.50 10*3/mm3      Monocytes, Absolute 1.97 10*3/mm3      Eosinophils, Absolute 0.00 10*3/mm3      Basophils, Absolute 0.04 10*3/mm3      Immature Grans, Absolute 0.09 10*3/mm3      nRBC 0.0 /100 WBC     POC Glucose Once [[id number]]  (Abnormal) Collected: 08/26/22 1937    Specimen: Blood Updated: 08/26/22 1938     Glucose 141 mg/dL      Comment: Meter: [id number] : [id number] Trellsofy Guthrie JOE       Renal Function Panel [[id number]]  (Abnormal) Collected: 08/26/22 1716    Specimen: Blood Updated: 08/26/22 1825     Glucose 143 mg/dL      BUN 16 mg/dL      Creatinine 1.17 mg/dL      Sodium 138 mmol/L      Potassium 4.1 mmol/L      Chloride 106 mmol/L      CO2 20.0 mmol/L      Calcium 8.9 mg/dL      Albumin 4.50 g/dL      Phosphorus 3.6 mg/dL      Anion Gap 12.0 mmol/L      BUN/Creatinine Ratio 13.7     eGFR 63.4 mL/min/1.73      Comment: National Kidney Foundation and American Society of Nephrology (ASN) Task Force recommended calculation based on the Chronic Kidney Disease Epidemiology Collaboration (CKD-EPI) equation refit without adjustment for race.       Narrative:      GFR Normal  >60  Chronic Kidney Disease <60  Kidney Failure <15      Magnesium [[id number]]  (Normal) Collected: 08/26/22 1716    Specimen: Blood Updated: 08/26/22 1824     Magnesium 2.4 mg/dL     Blood Gas, Arterial - [[id number]]  (Abnormal) Collected: 08/26/22 1718    Specimen: Arterial Blood Updated: 08/26/22 1734     Site Arterial Line     Cecilio's Test N/A     pH, Arterial 7.361 pH units      pCO2, Arterial 36.7 mm Hg      pO2, Arterial 70.8 mm Hg      HCO3, Arterial 20.8 mmol/L      Base Excess, Arterial -4.1 mmol/L      O2 Saturation Calculated 93.4 %      A-a DO2 0.3 mmHg      Barometric Pressure for Blood Gas 751.3 mmHg      Modality Adult Vent     FIO2 40 %      Ventilator Mode PS     Rate 24 Breaths/minute      PEEP 7     PSV 8 cmH2O      Comment: Meter: [id number] : [id number] Keanu Harrison       CBC (No Diff) [[id number]]  (Abnormal) Collected: 08/26/22 1716    Specimen: Blood Updated: 08/26/22 1734     WBC 12.01 10*3/mm3      RBC 4.27 10*6/mm3      Hemoglobin 13.4 g/dL      Hematocrit 39.9 %      MCV 93.4 fL      MCH 31.4 pg      MCHC 33.6 g/dL      RDW 13.4 %      RDW-SD 45.0 fl      MPV 10.0 fL      Platelets 136 10*3/mm3     POC Glucose Once [[id number]]  (Abnormal) Collected: 08/26/22 1727    Specimen: Blood Updated: 08/26/22 1728     Glucose 136 mg/dL      Comment: Meter: [id number] : [id number] Ronal Kay RN       Blood Gas, Arterial - [[id number]]  (Abnormal) Collected: 08/26/22 1630    Specimen: Arterial Blood Updated: 08/26/22 1633     Site Arterial Line     Cecilio's Test N/A     pH, Arterial 7.311 pH units      pCO2, Arterial 45.3 mm Hg      pO2, Arterial 85.1 mm Hg      HCO3, Arterial 22.9 mmol/L      Base Excess, Arterial -3.6 mmol/L      O2 Saturation Calculated 95.3 %      A-a DO2 0.3 mmHg      Barometric Pressure for Blood Gas 751.6 mmHg      Modality Adult Vent     FIO2 40 %      Ventilator Mode PS     Rate 27 Breaths/minute      PEEP 7     PSV 8 cmH2O      Comment: Meter: [id number]  : [id number] Keanu Harrison       POC Glucose Once [[id number]]  (Abnormal) Collected: 08/26/22 1548    Specimen: Blood Updated: 08/26/22 1550     Glucose 136 mg/dL      Comment: Meter: [id number] : [id number] Ronal Kay RN       POC Glucose Once [[id number]]  (Abnormal) Collected: 08/26/22 1419    Specimen: Blood Updated: 08/26/22 1421     Glucose 140 mg/dL      Comment: Meter: [id number] : [id number] Esteban Spangler RN       CBC & Differential [[id number]]  (Abnormal) Collected: 08/26/22 1313    Specimen: Blood Updated: 08/26/22 1419    Narrative:      The following orders were created for panel order CBC & Differential.  Procedure                               Abnormality         Status                     ---------                               -----------         ------                     CBC Auto Differential[[id number]]        Abnormal            Final result                 Please view results for these tests on the individual orders.    CBC Auto Differential [[id number]]  (Abnormal) Collected: 08/26/22 1313    Specimen: Blood Updated: 08/26/22 1419     WBC 16.70 10*3/mm3      RBC 4.43 10*6/mm3      Hemoglobin 14.1 g/dL      Hematocrit 41.5 %      MCV 93.7 fL      MCH 31.8 pg      MCHC 34.0 g/dL      RDW 13.1 %      RDW-SD 45.1 fl      MPV 9.9 fL      Platelets 138 10*3/mm3      Neutrophil % 84.5 %      Lymphocyte % 6.2 %      Monocyte % 7.7 %      Eosinophil % 0.8 %      Basophil % 0.3 %      Immature Grans % 0.5 %      Neutrophils, Absolute 14.11 10*3/mm3      Lymphocytes, Absolute 1.03 10*3/mm3      Monocytes, Absolute 1.29 10*3/mm3      Eosinophils, Absolute 0.13 10*3/mm3      Basophils, Absolute 0.05 10*3/mm3      Immature Grans, Absolute 0.09 10*3/mm3      nRBC 0.0 /100 WBC     Calcium, Ionized [[id number]]  (Normal) Collected: 08/26/22 1313    Specimen: Blood Updated: 08/26/22 1404     Ionized Calcium 1.31 mmol/L      Ionized Calcium 5.2 mg/dL     Protime-INR [[id number]]  (Abnormal)  Collected: 08/26/22 1313    Specimen: Blood Updated: 08/26/22 1357     Protime 15.1 Seconds      INR 1.21    Fibrinogen [[id number]]  (Normal) Collected: 08/26/22 1313    Specimen: Blood Updated: 08/26/22 1357     Fibrinogen 257 mg/dL     aPTT [[id number]]  (Normal) Collected: 08/26/22 1313    Specimen: Blood Updated: 08/26/22 1357     PTT 32.3 seconds     Renal Function Panel [[id number]]  (Abnormal) Collected: 08/26/22 1313    Specimen: Blood Updated: 08/26/22 1352     Glucose 152 mg/dL      BUN 14 mg/dL      Creatinine 1.10 mg/dL      Sodium 137 mmol/L      Potassium 4.0 mmol/L      Chloride 104 mmol/L      CO2 22.0 mmol/L      Calcium 8.9 mg/dL      Albumin 4.30 g/dL      Phosphorus 2.5 mg/dL      Anion Gap 11.0 mmol/L      BUN/Creatinine Ratio 12.7     eGFR 68.3 mL/min/1.73      Comment: National Kidney Foundation and American Society of Nephrology (ASN) Task Force recommended calculation based on the Chronic Kidney Disease Epidemiology Collaboration (CKD-EPI) equation refit without adjustment for race.       Narrative:      GFR Normal >60  Chronic Kidney Disease <60  Kidney Failure <15      Magnesium [[id number]]  (Normal) Collected: 08/26/22 1313    Specimen: Blood Updated: 08/26/22 1352     Magnesium 2.1 mg/dL     Blood Gas, Arterial - [[id number]]  (Abnormal) Collected: 08/26/22 1330    Specimen: Arterial Blood Updated: 08/26/22 1333     Site Arterial Line     Cecilio's Test N/A     pH, Arterial 7.361 pH units      pCO2, Arterial 40.0 mm Hg      pO2, Arterial 132.6 mm Hg      HCO3, Arterial 22.7 mmol/L      Base Excess, Arterial -2.6 mmol/L      O2 Saturation Calculated 98.9 %      A-a DO2 0.2 mmHg      Barometric Pressure for Blood Gas 752.4 mmHg      Modality Adult Vent     FIO2 100 %      Ventilator Mode AC     Set Tidal Volume 650     Set Mech Resp Rate 18     Rate 18 Breaths/minute      PEEP 7     Comment: Meter: [id number] : [id number] Keanu Harrison       POC Glucose Once [[id number]]  (Abnormal)  Collected: 08/26/22 1319    Specimen: Blood Updated: 08/26/22 1320     Glucose 141 mg/dL      Comment: Meter: [id number] : [id number] Esteban Spangler RN       POC Activated Clotting Time [[id number]]  (Normal) Collected: 08/26/22 1133    Specimen: Arterial Blood Updated: 08/26/22 1214     Activated Clotting Time  126 Seconds      Comment: Serial Number: [id number]:  [id number]       POC Activated Clotting Time [[id number]]  (Abnormal) Collected: 08/26/22 1051    Specimen: Arterial Blood Updated: 08/26/22 1214     Activated Clotting Time  486 Seconds      Comment: Serial Number: [id number]:  [id number]       POC Activated Clotting Time [[id number]]  (Abnormal) Collected: 08/26/22 1020    Specimen: Arterial Blood Updated: 08/26/22 1213     Activated Clotting Time  422 Seconds      Comment: Serial Number: [id number]:  [id number]       POC Activated Clotting Time [[id number]]  (Abnormal) Collected: 08/26/22 1000    Specimen: Arterial Blood Updated: 08/26/22 1213     Activated Clotting Time  486 Seconds      Comment: Serial Number: [id number]:  [id number]       POC Activated Clotting Time [[id number]]  (Normal) Collected: 08/26/22 0812    Specimen: Arterial Blood Updated: 08/26/22 1213     Activated Clotting Time  121 Seconds      Comment: Serial Number: [id number]:  [id number]       POC OR Panel, ISTAT [[id number]]  (Abnormal) Collected: 08/26/22 1134    Specimen: Arterial Blood Updated: 08/26/22 1213     POC Potassium 4.3 mmol/L      Total CO2 26 mmol/L      Hemoglobin 12.2 g/dL      Hematocrit 36 %      pH, Arterial 7.3880 pH units      HCO3, Arterial 24.9 mmol/L      Base Excess 0.0000 mmol/L      O2 Saturation, Arterial 100 %      pO2, Arterial 347 mmHg      pCO2, Arterial 42.2 mm Hg      Comment: Serial Number: [id number]:  [id number]        Glucose 140 mg/dL      Ionized Calcium --    POC OR Panel, ISTAT [[id number]]  (Abnormal) Collected: 08/26/22 1052    Specimen: Arterial Blood Updated: 08/26/22 1213     POC Potassium 4.6  mmol/L      Total CO2 29 mmol/L      Hemoglobin 12.9 g/dL      Hematocrit 38 %      pH, Arterial 7.4350 pH units      HCO3, Arterial 27.8 mmol/L      Base Excess 3.0000 mmol/L      O2 Saturation, Arterial 100 %      pO2, Arterial 365 mmHg      pCO2, Arterial 44.3 mm Hg      Comment: Serial Number: [id number]:  [id number]        Glucose 142 mg/dL      Ionized Calcium --    POC OR Panel, ISTAT [[id number]]  (Abnormal) Collected: 08/26/22 1025    Specimen: Venous Blood Updated: 08/26/22 1213     POC Potassium 4.5 mmol/L      Total CO2 28 mmol/L      Hemoglobin 11.2 g/dL      Hematocrit 33 %      pH, Arterial 7.3740 pH units      HCO3, Arterial 26.5 mmol/L      Base Excess 1.0000 mmol/L      O2 Saturation, Arterial 81 %      pO2, Arterial 48 mmHg      pCO2, Arterial 48.5 mm Hg      Comment: Serial Number: [id number]:  [id number]        Glucose 127 mg/dL      Ionized Calcium --    POC OR Panel, ISTAT [[id number]]  (Abnormal) Collected: 08/26/22 1021    Specimen: Arterial Blood Updated: 08/26/22 1212     POC Potassium 4.0 mmol/L      Total CO2 28 mmol/L      Hemoglobin 11.2 g/dL      Hematocrit 33 %      pH, Arterial 7.3600 pH units      HCO3, Arterial 26.1 mmol/L      Base Excess 0.0000 mmol/L      O2 Saturation, Arterial 100 %      pO2, Arterial 369 mmHg      pCO2, Arterial 49.2 mm Hg      Comment: Serial Number: [id number]:  [id number]        Glucose 126 mg/dL      Ionized Calcium --    POC OR Panel, ISTAT [[id number]] Collected: 08/26/22 0812    Specimen: Arterial Blood Updated: 08/26/22 1212     POC Potassium 3.8 mmol/L      Total CO2 25 mmol/L      Hemoglobin 14.3 g/dL      Hematocrit 42 %      pH, Arterial 7.3760 pH units      HCO3, Arterial 23.5 mmol/L      Base Excess -2.0000 mmol/L      O2 Saturation, Arterial 96 %      pO2, Arterial 85 mmHg      pCO2, Arterial 40.7 mm Hg      Comment: Serial Number: [id number]:  [id number]        Glucose 114 mg/dL      Ionized Calcium --    POC Glucose Once [[id number]]  (Normal)  Collected: 08/26/22 0505    Specimen: Blood Updated: 08/26/22 0506     Glucose 100 mg/dL      Comment: Meter: [id number] : [id number] Huber ABDI       Urinalysis With Culture If Indicated - Urine, Clean Catch [[id number]]  (Normal) Collected: 08/26/22 0334    Specimen: Urine, Clean Catch Updated: 08/26/22 0342     Color, UA Yellow     Appearance, UA Clear     pH, UA 6.5     Specific Gravity, UA 1.013     Glucose, UA Negative     Ketones, UA Negative     Bilirubin, UA Negative     Blood, UA Negative     Protein, UA Negative     Leuk Esterase, UA Negative     Nitrite, UA Negative     Urobilinogen, UA 0.2 E.U./dL    Narrative:      In absence of clinical symptoms, the presence of pyuria, bacteria, and/or nitrites on the urinalysis result does not correlate with infection.  Urine microscopic not indicated.    COVID PRE-OP / PRE-PROCEDURE SCREENING ORDER (NO ISOLATION) - Swab, Nasopharynx [[id number]]  (Normal) Collected: 08/25/22 2255    Specimen: Swab from Nasopharynx Updated: 08/26/22 0234    Narrative:      The following orders were created for panel order COVID PRE-OP / PRE-PROCEDURE SCREENING ORDER (NO ISOLATION) - Swab, Nasopharynx.  Procedure                               Abnormality         Status                     ---------                               -----------         ------                     COVID-19,APTIMA PANTHER(...[[id number]]  Normal              Final result                 Please view results for these tests on the individual orders.    COVID-19,APTIMA PANTHER(RONEL),[facility]/ MELVIN, NP/OP SWAB IN UTM/VTM/[facility] TRANSPORT MEDIA,24 HR TAT - Swab, Nasopharynx [[id number]]  (Normal) Collected: 08/25/22 2255    Specimen: Swab from Nasopharynx Updated: 08/26/22 0234     COVID19 Not Detected    Narrative:      Fact sheet for providers: https://www.fda.gov/media/860897/download     Fact sheet for patients: https://www.fda.gov/media/948285/download    Test performed by RT PCR.    BNP [[id number]]   (Normal) Collected: 08/25/22 2326    Specimen: Blood Updated: 08/26/22 0032     proBNP 303.0 pg/mL     Narrative:      Among patients with dyspnea, NT-proBNP is highly sensitive for the detection of acute congestive heart failure. In addition NT-proBNP of <300 pg/ml effectively rules out acute congestive heart failure with 99% negative predictive value.    Results may be falsely decreased if patient taking Biotin.      Platelet Function ADP [[id number]]  (Abnormal) Collected: 08/25/22 2326    Specimen: Blood Updated: 08/26/22 0011     ADP Aggregation, % Platelet 61 %     Lipid Panel [[id number]]  (Abnormal) Collected: 08/25/22 2326    Specimen: Blood Updated: 08/26/22 0010     Total Cholesterol 122 mg/dL      Triglycerides 136 mg/dL      HDL Cholesterol 35 mg/dL      LDL Cholesterol  63 mg/dL      VLDL Cholesterol 24 mg/dL      LDL/HDL Ratio 1.71    Narrative:      Cholesterol Reference Ranges  (U.S. Department of Health and Human Services ATP III Classifications)    Desirable          <200 mg/dL  Borderline High    200-239 mg/dL  High Risk          >240 mg/dL      Triglyceride Reference Ranges  (U.S. Department of Health and Human Services ATP III Classifications)    Normal           <150 mg/dL  Borderline High  150-199 mg/dL  High             200-499 mg/dL  Very High        >500 mg/dL    HDL Reference Ranges  (U.S. Department of Health and Human Services ATP III Classifications)    Low     <40 mg/dl (major risk factor for CHD)  High    >60 mg/dl ('negative' risk factor for CHD)        LDL Reference Ranges  (U.S. Department of Health and Human Services ATP III Classifications)    Optimal          <100 mg/dL  Near Optimal     100-129 mg/dL  Borderline High  130-159 mg/dL  High             160-189 mg/dL  Very High        >189 mg/dL    Comprehensive Metabolic Panel [[id number]]  (Abnormal) Collected: 08/25/22 2326    Specimen: Blood Updated: 08/26/22 0010     Glucose 115 mg/dL      BUN 16 mg/dL      Creatinine 1.04 mg/dL       Sodium 138 mmol/L      Potassium 3.9 mmol/L      Chloride 103 mmol/L      CO2 27.5 mmol/L      Calcium 9.2 mg/dL      Total Protein 6.5 g/dL      Albumin 4.10 g/dL      ALT (SGPT) 25 U/L      AST (SGOT) 22 U/L      Alkaline Phosphatase 44 U/L      Total Bilirubin 0.5 mg/dL      Globulin 2.4 gm/dL      A/G Ratio 1.7 g/dL      BUN/Creatinine Ratio 15.4     Anion Gap 7.5 mmol/L      eGFR 73.0 mL/min/1.73      Comment: National Kid[employer] and American Society of Nephrology (ASN) Task Force recommended calculation based on the Chronic Kidney Disease Epidemiology Collaboration (CKD-EPI) equation refit without adjustment for race.       Narrative:      GFR Normal >60  Chronic Kidney Disease <60  Kidney Failure <15      Magnesium [[id number]]  (Normal) Collected: 08/25/22 2326    Specimen: Blood Updated: 08/26/22 0010     Magnesium 2.0 mg/dL     Protime-INR [[id number]]  (Normal) Collected: 08/25/22 2326    Specimen: Blood Updated: 08/26/22 0006     Protime 13.6 Seconds      INR 1.05    aPTT [[id number]]  (Normal) Collected: 08/25/22 2326    Specimen: Blood Updated: 08/26/22 0006     PTT 28.8 seconds     Hemoglobin A1c [[id number]]  (Abnormal) Collected: 08/25/22 2326    Specimen: Blood Updated: 08/25/22 2357     Hemoglobin A1C 6.00 %     Narrative:      Hemoglobin A1C Ranges:    Increased Risk for Diabetes  5.7% to 6.4%  Diabetes                     >= 6.5%  Diabetic Goal                < 7.0%    CBC & Differential [[id number]]  (Normal) Collected: 08/25/22 2326    Specimen: Blood Updated: 08/25/22 2340    Narrative:      The following orders were created for panel order CBC & Differential.  Procedure                               Abnormality         Status                     ---------                               -----------         ------                     CBC Auto Differential[[id number]]        Normal              Final result                 Please view results for these tests on the individual orders.     CBC Auto Differential [[id number]]  (Normal) Collected: 08/25/22 2326    Specimen: Blood Updated: 08/25/22 2340     WBC 7.11 10*3/mm3      RBC 4.79 10*6/mm3      Hemoglobin 15.4 g/dL      Hematocrit 44.8 %      MCV 93.5 fL      MCH 32.2 pg      MCHC 34.4 g/dL      RDW 13.3 %      RDW-SD 45.6 fl      MPV 10.0 fL      Platelets 186 10*3/mm3      Neutrophil % 58.5 %      Lymphocyte % 27.7 %      Monocyte % 8.7 %      Eosinophil % 4.1 %      Basophil % 0.7 %      Immature Grans % 0.3 %      Neutrophils, Absolute 4.16 10*3/mm3      Lymphocytes, Absolute 1.97 10*3/mm3      Monocytes, Absolute 0.62 10*3/mm3      Eosinophils, Absolute 0.29 10*3/mm3      Basophils, Absolute 0.05 10*3/mm3      Immature Grans, Absolute 0.02 10*3/mm3      nRBC 0.0 /100 WBC     POC Glucose Once [[id number]]  (Normal) Collected: 08/25/22 2048    Specimen: Blood Updated: 08/25/22 2051     Glucose 100 mg/dL      Comment: Meter: [id number] : [id number] Moi ABDI       Blood Gas, Arterial - [[id number]]  (Abnormal) Collected: 08/25/22 2002    Specimen: Arterial Blood Updated: 08/25/22 2005     Site Arterial: left radial     Cecilio's Test Positive     pH, Arterial 7.442 pH units      pCO2, Arterial 34.7 mm Hg      pO2, Arterial 81.6 mm Hg      HCO3, Arterial 23.7 mmol/L      Base Excess, Arterial 0.1 mmol/L      O2 Saturation Calculated 96.5 %      Barometric Pressure for Blood Gas 751.8 mmHg      Comment: sat 96 Meter: [id number] : [id number] Felicia DePaul        Modality Room Air     Rate 20 Breaths/minute           Imaging Results (All)     Procedure Component Value Units Date/Time    XR Chest PA & Lateral [[id number]] Collected: 09/07/22 1518     Updated: 09/07/22 1525    Narrative:      XR CHEST PA AND LATERAL-     HISTORY: Male who is 77 years-old,  postoperative evaluation     TECHNIQUE: Frontal and lateral views of the chest     COMPARISON: 9/1/2022     FINDINGS: The heart size is borderline. Sternotomy wires are  present.  Pulmonary vasculature appears unremarkable. Minimal likely atelectasis  at the bases, minimal pleural effusions. No pneumothorax. No acute  osseous process.       Impression:      Minimal likely atelectasis at the bases, minimal pleural  effusions. Borderline heart size.     This report was finalized on 9/7/2022 3:22 PM by Dr. Jero Kramer M.D.       XR Abdomen KUB [[id number]] Collected: 09/04/22 0846     Updated: 09/04/22 0901    Narrative:      KUB     HISTORY: 77-year-old with ileus. Followup exam.     COMPARISON: KUB 09/03/2022, 09/02/2022, 09/01/2022.     FINDINGS: There is persistent gas distention of bowel loops of the  colon. Ascending colon is now dilated measuring 9.4 cm which is  increased when compared to yesterday's exam though improved when  compared to the exam 2 days previous. There is persistent gas dilatation  of the transverse colon and colon within the central pelvis most likely  representing redundant sigmoid colon. No air-filled dilated small bowel  loops are demonstrated. There remains a paucity of bowel gas overlying  the distal sigmoid colon and rectum.       Impression:      Persistent abnormal gaseous dilatation of colonic loops with  evidence for improvement when compared to exam 2 days ago. There remains  a paucity of bowel gas overlying the distal sigmoid colon and rectum.     This report was finalized on 9/4/2022 8:58 AM by Dr. Geovanny Quiroga M.D.       XR Abdomen KUB [[id number]] Collected: 09/03/22 0944     Updated: 09/03/22 0948    Narrative:      KUB     HISTORY: Ileus. Followup exam.     COMPARISON: KUB 09/02/2022, 09/01/2022.     FINDINGS: The degree of right colon dilatation exhibits improvement when  compared to yesterday's exam. There is persistent mild to moderate  gaseous distention of the transverse colon and sigmoid colon. No  air-filled, dilated small bowel loops are demonstrated. Sternotomy wires  and cholecystectomy clips are present.        Impression:      There is some evidence of improvement when compared to  yesterday's exam. Gaseous dilatation of the right colon has improved.  Persistent mild to moderate gaseous distention of the transverse and  proximal sigmoid colon.     This report was finalized on 9/3/2022 9:45 AM by Dr. Geovanny Quiroga M.D.       XR Abdomen KUB [[id number]] Collected: 09/02/22 0657     Updated: 09/02/22 0713    Narrative:      KUB     HISTORY: Ileus. Follow-up exam.     COMPARISON: KUB 09/01/2022 at 4:33 PM and 6:06 AM, KUB 08/31/2022,  08/30/2022.     FINDINGS: There remains gaseous dilatation of the colon and to lesser  degree small bowel. The degree of dilatation is greatest involving the  ascending colon which measures approximately 15.5 cm transverse  dimension which is slightly increased compared to yesterday's exam,  though this may be technical/positional. There is a paucity of stool  overlying the region of the rectum. Median sternotomy wires are present.  There are no pathologic calcifications.       Impression:      Persistent gaseous dilatation of bowel loops greatest  involving the colon and this may be due to an ileus. A distal  obstructing process is also a consideration. There is a paucity of gas  overlying the rectum. No evidence for significant change compared to  yesterday's exam.     This report was finalized on 9/2/2022 7:10 AM by Dr. Geovanny Quiroga M.D.       XR Abdomen KUB [[id number]] Collected: 09/01/22 1825     Updated: 09/02/22 0607    Narrative:      KUB     CLINICAL HISTORY: Follow-up colonic ileus.     KUB demonstrates marked gaseous distention of the entire large bowel as  well as to a lesser extent portions of the small bowel. The ascending  colon measures up to approximately 13.4 cm in diameter and when compared  to the previous study timed at 6 AM from earlier today, there is no  significant interval change. Again, the findings are most suggestive of  a prominent adynamic ileus. Continued  follow-up is suggested.     This report was finalized on 9/2/2022 6:04 AM by Dr. Kwesi Jeffrey M.D.       XR Chest 1 View [[id number]] Collected: 09/01/22 0731     Updated: 09/01/22 0731    Narrative:        Patient: YENIFER MAZARIEGOS  Time Out: 07:31  Exam(s): FILM CXR 1 VIEW     EXAM:    XR Chest, 1 View    CLINICAL HISTORY:     Reason for exam: post op open heart. post op.    TECHNIQUE:    Frontal view of the chest.    COMPARISON:    8 29 2022    FINDINGS:      Assessment of the lung bases is slightly limited by shallow   inspiration low lung volumes.  There is mild bibasilar atelectasis.  The   lungs are otherwise clear.  Sternal wires are noted.  The cardiac   silhouette is within normal limits.     IMPRESSION:       Mild bibasilar atelectasis..      Impression:          Electronically signed by Myranda Suresh MD on 09-01-22 at 0731    XR Abdomen KUB [[id number]] Collected: 09/01/22 0637     Updated: 09/01/22 0643    Narrative:      ONE VIEW ABDOMEN AT 6:06 AM     HISTORY: Recent cardiac surgery. Abdominal distention.     FINDINGS: There is prominent gaseous distention of the entire colon and  to lesser extent small bowel. The cecum has a diameter of 14.6 cm  compared to 13.1 cm on yesterday's exam and the findings remain  consistent with prominent postoperative ileus. The patient may benefit  from a rectal tube if clinically indicated. Continued follow-up  evaluation is recommended.     This report was finalized on 9/1/2022 6:40 AM by Dr. Addy Stafford M.D.       XR Abdomen KUB [[id number]] Collected: 08/31/22 0730     Updated: 08/31/22 0734    Narrative:      ONE VIEW ABDOMEN     HISTORY: Follow-up of ileus.     FINDINGS: There is a large amount of bowel gas throughout the colon and  to a lesser extent small bowel and most consistent with ileus. The cecum  has a diameter of 13.1 cm and this shows no change from yesterday's  examination.     This report was finalized on 8/31/2022 7:31 AM by Dr. Addy Stafford M.D.        XR Abdomen KUB [[id number]] Collected: 08/30/22 1415     Updated: 08/30/22 1422    Narrative:      XR ABDOMEN KUB-  08/30/2022     HISTORY: Abdominal distention.     3 images are submitted. There is a moderately large amount of air within  the colon. The colon is most severely distended in the right colon and  measures up to 13.1 cm in diameter. There is air extending into the  rectum.     No significant small bowel dilatation is seen.     No free air is evident on these 3 supine images.       Impression:      1. Moderately severe gaseous distention of the colon particularly in the  right colon which measures up to 13.1 cm in diameter.  2. There is air extending into the rectosigmoid colon.  3. Follow-up abdominal radiographs suggested.     This report was finalized on 8/30/2022 2:19 PM by Dr. Kashmir Zuñiga M.D.       XR Chest PA & Lateral [[id number]] Collected: 08/29/22 1438     Updated: 08/29/22 1442    Narrative:      XR CHEST PA AND LATERAL-     Clinical: Status post open heart surgery     COMPARISON 8/28/2022     FINDINGS: Right IJ sheath removed in the interim. Shallow inspiratory  effort with atelectasis at both lung bases. No new airspace disease has  developed, the cardiomediastinal silhouette is stable. The remainder is  unremarkable.     This report was finalized on 8/29/2022 2:39 PM by Dr. Tom Edmond M.D.       XR Chest 1 View [[id number]] Collected: 08/28/22 1523     Updated: 08/28/22 1527    Narrative:      XR CHEST 1 VW-     HISTORY: Male who is 77 years-old,  chest tube removal     TECHNIQUE: Frontal views of the chest     COMPARISON: 08/28/2022     FINDINGS: Interval removal of chest tubes. Right IJ sheath appears  stable. Sternotomy wires are noted. The heart size is borderline.  Pulmonary vasculature is unremarkable. Minimal likely atelectasis at the  bases. No pleural effusion, or definite pneumothorax. No acute osseous  process.       Impression:      Chest tubes removed. No definite  pneumothorax.     This report was finalized on 8/28/2022 3:24 PM by Dr. Jero Kramer M.D.       XR Chest 1 View [[id number]] Collected: 08/28/22 0514     Updated: 08/28/22 0514    Narrative:        Patient: YENIFER MAZARIEGOS  Time Out: 05:14  Exam(s): FILM CXR 1 VIEW     EXAM:    XR Chest, 1 View    CLINICAL HISTORY:       Post-Op Heart Surgery.    TECHNIQUE:    Frontal view of the chest.    COMPARISON:    Chest radiograph 8 27 2022    FINDINGS:    Lungs:  Low lung volumes are redemonstrated.  Interstitial prominence   is also redemonstrated.    Pleural space:  Unremarkable.  No pneumothorax.    Heart:  Unremarkable.  No cardiomegaly.    Mediastinum:  Mediastinal drains.    Bones joints:  Unremarkable.    Tubes, lines and devices:  Interval removal of the Wallace-José Miguel catheter.    IMPRESSION:         Interval removal of the Wallace-José Miguel catheter.  Otherwise stable chest x-  ray.      Impression:          Electronically signed by Kari Santamaria MD on 08-28-22 at 0514    XR Chest 1 View [[id number]] Collected: 08/27/22 0540     Updated: 08/27/22 0540    Narrative:        Patient: YENIFER MAZARIEGOS  Time Out: 05:39  Exam(s): FILM CXR 1 VIEW     EXAM:    XR Chest, 1 View    CLINICAL HISTORY:     Reason for exam: Post-Op Heart Surgery.    TECHNIQUE:    Frontal view of the chest.    COMPARISON:    8 26 2022.    FINDINGS IMPRESSION:         Interval extubation.  Remaining support devices are unchanged.    Improved aeration of the left lung base.  No other significant interval   change.  Continued attention on follow-up is recommended.      Impression:          Electronically signed by Gino Talley MD on 08-27-22 at 0539    XR Chest 1 View [[id number]] Collected: 08/26/22 1347     Updated: 08/26/22 1352    Narrative:      XR CHEST 1 VW-     HISTORY: Male who is 77 years-old,  postoperative evaluation     TECHNIQUE: Frontal views of the chest     COMPARISON: 08/25/2022     FINDINGS: An endotracheal tube tip is 3.1 cm above the  cheri. Right IJ  Elma-José Miguel catheter extends the pulmonary trunk. Sternotomy wires, left  chest tubes are noted. The heart size is normal. Pulmonary vasculature  is unremarkable. Likely atelectasis in the lower lungs. Left pleural  effusion is suggested. No pneumothorax is seen. Otherwise stable.       Impression:      Postsurgical changes.     This report was finalized on 8/26/2022 1:49 PM by Dr. Jero Kramer M.D.       XR Chest PA & Lateral [[id number]] Collected: 08/25/22 2046     Updated: 08/25/22 2050    Narrative:      XR CHEST PA AND LATERAL-     HISTORY: Male who is 77 years-old,  preoperative evaluation     TECHNIQUE: Frontal and lateral views of the chest     COMPARISON: 11/08/2017     FINDINGS: Heart, mediastinum and pulmonary vasculature are unremarkable.  No focal pulmonary consolidation. Slight posterior costophrenic angle  blunting on the lateral view may reflect minimal pleural effusion. No  pneumothorax. No acute osseous process.       Impression:      No focal pulmonary consolidation. Slight posterior  costophrenic angle blunting on the lateral view may reflect minimal  pleural effusion. Follow-up as clinical indications persist.     This report was finalized on 8/25/2022 8:47 PM by Dr. Jero Kramer M.D.             Medication Review:   Current Facility-Administered Medications   Medication Dose Route Frequency Provider Last Rate Last Admin   • acetaminophen (OFIRMEV) injection 1,000 mg  1,000 mg Intravenous Q8H PRN Juliane Solis APRN       • acetaminophen (TYLENOL) tablet 650 mg  650 mg Oral Q4H PRN Juliane Solis APRN   650 mg at 09/08/22 0140    Or   • acetaminophen (TYLENOL) 160 MG/5ML solution 650 mg  650 mg Oral Q4H PRN Juliane Solis APRN        Or   • acetaminophen (TYLENOL) suppository 650 mg  650 mg Rectal Q4H PRN Juliane Solis APRN       • apixaban (ELIQUIS) tablet 5 mg  5 mg Oral Q12H Aracely Tilley APRN   5 mg at 09/07/22 0903   • aspirin EC tablet 81 mg  81  mg Oral Daily Juliane Solis APRN   81 mg at 09/08/22 0921   • atenolol (TENORMIN) tablet 25 mg  25 mg Oral Q12H Juliane Solis APRN   25 mg at 09/08/22 0921   • atorvastatin (LIPITOR) tablet 10 mg  10 mg Oral Nightly Aracely Tilley APRN   10 mg at 09/07/22 2056   • atropine sulfate injection 1 mg  1 mg Intravenous Once PRN Jeancarlos Bundy MD       • bisacodyl (DULCOLAX) EC tablet 10 mg  10 mg Oral Daily PRN Juliane Solis APRN       • bisacodyl (DULCOLAX) suppository 10 mg  10 mg Rectal Daily PRN Juliane Solis APRN   10 mg at 08/31/22 0813   • bumetanide (BUMEX) tablet 2 mg  2 mg Oral Daily Juliane Solis APRN   2 mg at 09/08/22 0921   • chlorhexidine (PERIDEX) 0.12 % solution 15 mL  15 mL Mouth/Throat Q12H Juliane Solis APRN   15 mL at 09/07/22 0904   • cyclobenzaprine (FLEXERIL) tablet 10 mg  10 mg Oral Q8H PRN Juliane Solis APRN   10 mg at 08/29/22 1512   • dextrose (D50W) (25 g/50 mL) IV injection 10-50 mL  10-50 mL Intravenous Q15 Min PRN Juliane Solis APRN       • dextrose (GLUTOSE) oral gel 15 g  15 g Oral Q15 Min PRN Juliane Solis APRN       • fluticasone (FLONASE) 50 MCG/ACT nasal spray 2 spray  2 spray Nasal PRN Juliane Solis APRN       • glucagon (human recombinant) (GLUCAGEN DIAGNOSTIC) injection 1 mg  1 mg Intramuscular Q15 Min PRN Juliane Solis APRN       • ipratropium-albuterol (DUO-NEB) nebulizer solution 3 mL  3 mL Nebulization Q4H PRN Juliane Solis APRN   3 mL at 09/02/22 2124   • losartan (COZAAR) tablet 100 mg  100 mg Oral Q24H Juliane Solis APRN   100 mg at 09/08/22 0921   • montelukast (SINGULAIR) tablet 10 mg  10 mg Oral Nightly Juliane Solis APRN   10 mg at 09/07/22 2056   • mupirocin (BACTROBAN) 2 % nasal ointment   Each Nare BID Juliane Solis APRN   1 application at 09/07/22 2056   • nitroglycerin (NITROSTAT) SL tablet 0.4 mg  0.4 mg Sublingual Q5 Min PRN Jr Wayne Ruiz MD       • ondansetron (ZOFRAN) injection 4 mg  4 mg  Intravenous Q6H PRN Juliane Solis APRN   4 mg at 08/29/22 1127   • pantoprazole (PROTONIX) EC tablet 40 mg  40 mg Oral QAM Juliane Solis APRN   40 mg at 09/08/22 0637   • polyethylene glycol (MIRALAX) packet 17 g  17 g Oral Daily PRN Juliane Solis APRN   17 g at 08/29/22 0825   • polyethylene glycol (MIRALAX) packet 17 g  17 g Oral Daily Teresa Ng MD   17 g at 09/05/22 0852   • potassium chloride (K-DUR,KLOR-CON) ER tablet 20 mEq  20 mEq Oral Daily Juliane Solis APRN   20 mEq at 09/07/22 0904   • potassium chloride (K-DUR,KLOR-CON) ER tablet 40 mEq  40 mEq Oral PRN Danny Hopson MD   40 mEq at 09/08/22 0920   • potassium chloride (KLOR-CON) packet 40 mEq  40 mEq Oral PRN Danny Hopson MD   40 mEq at 09/04/22 1607   • potassium chloride 20 mEq in 50 mL IVPB  20 mEq Intravenous Q1H PRN Juliane Solis APRN       • potassium chloride 20 mEq in 50 mL IVPB  20 mEq Intravenous Q1H PRN Juliane Solis APRN       • potassium chloride 20 mEq in 50 mL IVPB  20 mEq Intravenous Q1H PRN Juliane Solis APRN       • simethicone (MYLICON) chewable tablet 80 mg  80 mg Oral 4x Daily Jeancarlos Bundy MD   80 mg at 09/08/22 0921   • tamsulosin (FLOMAX) 24 hr capsule 0.4 mg  0.4 mg Oral Daily Juliane Solis APRN   0.4 mg at 09/08/22 0921       Current Facility-Administered Medications:   •  acetaminophen (OFIRMEV) injection 1,000 mg, 1,000 mg, Intravenous, Q8H PRN, Juliane Solis APRN  •  acetaminophen (TYLENOL) tablet 650 mg, 650 mg, Oral, Q4H PRN, 650 mg at 09/08/22 0140 **OR** acetaminophen (TYLENOL) 160 MG/5ML solution 650 mg, 650 mg, Oral, Q4H PRN **OR** acetaminophen (TYLENOL) suppository 650 mg, 650 mg, Rectal, Q4H PRN, Juliane Solis APRN  •  apixaban (ELIQUIS) tablet 5 mg, 5 mg, Oral, Q12H, Aracely Tilley APRN, 5 mg at 09/07/22 0903  •  aspirin EC tablet 81 mg, 81 mg, Oral, Daily, Juliane Solis APRN, 81 mg at 09/08/22 0921  •  atenolol (TENORMIN) tablet 25 mg, 25  mg, Oral, Q12H, Juliane Solis APRN, 25 mg at 09/08/22 0921  •  atorvastatin (LIPITOR) tablet 10 mg, 10 mg, Oral, Nightly, Aracely Tilley, APRN, 10 mg at 09/07/22 2056  •  atropine sulfate injection 1 mg, 1 mg, Intravenous, Once PRN, Jeancarlos Bundy MD  •  bisacodyl (DULCOLAX) EC tablet 10 mg, 10 mg, Oral, Daily PRN, Juliane Solis APRN  •  bisacodyl (DULCOLAX) suppository 10 mg, 10 mg, Rectal, Daily PRN, Juliane Solis APRN, 10 mg at 08/31/22 0813  •  bumetanide (BUMEX) tablet 2 mg, 2 mg, Oral, Daily, Juliane Solis APRN, 2 mg at 09/08/22 0921  •  chlorhexidine (PERIDEX) 0.12 % solution 15 mL, 15 mL, Mouth/Throat, Q12H, Juliane Solis APRN, 15 mL at 09/07/22 0904  •  cyclobenzaprine (FLEXERIL) tablet 10 mg, 10 mg, Oral, Q8H PRN, Juliane Solis APRN, 10 mg at 08/29/22 1512  •  dextrose (D50W) (25 g/50 mL) IV injection 10-50 mL, 10-50 mL, Intravenous, Q15 Min PRN, Juliane Solis APRN  •  dextrose (GLUTOSE) oral gel 15 g, 15 g, Oral, Q15 Min PRN, Juliane Solis APRN  •  fluticasone (FLONASE) 50 MCG/ACT nasal spray 2 spray, 2 spray, Nasal, PRN, Juliane Solis APRN  •  glucagon (human recombinant) (GLUCAGEN DIAGNOSTIC) injection 1 mg, 1 mg, Intramuscular, Q15 Min PRN, Juliane Solis APRN  •  ipratropium-albuterol (DUO-NEB) nebulizer solution 3 mL, 3 mL, Nebulization, Q4H PRN, Juliane Solis APRN, 3 mL at 09/02/22 2124  •  losartan (COZAAR) tablet 100 mg, 100 mg, Oral, Q24H, Juliane Solis APRN, 100 mg at 09/08/22 0921  •  montelukast (SINGULAIR) tablet 10 mg, 10 mg, Oral, Nightly, Juliane Solis APRN, 10 mg at 09/07/22 2056  •  mupirocin (BACTROBAN) 2 % nasal ointment, , Each Nare, BID, Juliane Solis APRN, 1 application at 09/07/22 2056  •  nitroglycerin (NITROSTAT) SL tablet 0.4 mg, 0.4 mg, Sublingual, Q5 Min PRN, Jr Wayne Ruiz MD  •  ondansetron (ZOFRAN) injection 4 mg, 4 mg, Intravenous, Q6H PRN, Juliane Solis APRN, 4 mg at 08/29/22 1127  •  [COMPLETED]  pantoprazole (PROTONIX) injection 40 mg, 40 mg, Intravenous, Once, 40 mg at 08/26/22 1429 **FOLLOWED BY** pantoprazole (PROTONIX) EC tablet 40 mg, 40 mg, Oral, QAM, Juliane Solis APRN, 40 mg at 09/08/22 0637  •  polyethylene glycol (MIRALAX) packet 17 g, 17 g, Oral, Daily PRN, Juliane Solis APRN, 17 g at 08/29/22 0825  •  polyethylene glycol (MIRALAX) packet 17 g, 17 g, Oral, Daily, Teresa Ng MD, 17 g at 09/05/22 0852  •  potassium chloride (K-DUR,KLOR-CON) ER tablet 20 mEq, 20 mEq, Oral, Daily, Juliane Solis APRN, 20 mEq at 09/07/22 0904  •  potassium chloride (K-DUR,KLOR-CON) ER tablet 40 mEq, 40 mEq, Oral, PRN, Danny Hopson MD, 40 mEq at 09/08/22 0920  •  potassium chloride (KLOR-CON) packet 40 mEq, 40 mEq, Oral, PRN, Danny Hopson MD, 40 mEq at 09/04/22 1607  •  potassium chloride 20 mEq in 50 mL IVPB, 20 mEq, Intravenous, Q1H PRN, Juliane Solis APRN  •  potassium chloride 20 mEq in 50 mL IVPB, 20 mEq, Intravenous, Q1H PRN, Juliane Solis APRN  •  potassium chloride 20 mEq in 50 mL IVPB, 20 mEq, Intravenous, Q1H PRN, Juliane Solis APRN  •  simethicone (MYLICON) chewable tablet 80 mg, 80 mg, Oral, 4x Daily, Jeancarlos Bundy MD, 80 mg at 09/08/22 0921  •  tamsulosin (FLOMAX) 24 hr capsule 0.4 mg, 0.4 mg, Oral, Daily, Juliane Solis APRN, 0.4 mg at 09/08/22 0921  Medications Discontinued During This Encounter   Medication Reason   • protamine injection Patient Discharge   • papaverine injection Patient Discharge   • heparin (porcine) 5000 UNIT/ML injection Patient Discharge   • gelatin absorbable (GELFOAM) sponge Patient Discharge   • sodium chloride 0.9 % flush 10 mL    • sodium chloride 0.9 % flush 10 mL    • insulin regular 1 unit/mL in 0.9% sodium chloride    • sodium chloride 0.9 % flush 30 mL    • sodium chloride 0.9 % infusion    • dextrose (GLUTOSE) oral gel 15 g    • dextrose (D50W) (25 g/50 mL) IV injection 10-50 mL    • glucagon (human recombinant)  (GLUCAGEN DIAGNOSTIC) injection 1 mg    • metoprolol tartrate (LOPRESSOR) tablet 12.5 mg    • hydrALAZINE (APRESOLINE) injection 10 mg    • atorvastatin (LIPITOR) tablet 10 mg    • DOPamine 400 mg in 250 mL D5W infusion    • nitroglycerin (TRIDIL) 200 mcg/ml infusion    • clevidipine (CLEVIPREX) infusion 0.5 mg/mL    • EPINEPHrine 5 mg in 250 mL NS infusion    • acetaminophen (TYLENOL) tablet 650 mg    • acetaminophen (TYLENOL) 160 MG/5ML solution 650 mg    • acetaminophen (TYLENOL) suppository 650 mg    • insulin regular 1 unit/mL in 0.9% sodium chloride    • propofol (DIPRIVAN) infusion 10 mg/mL 100 mL    • dexmedetomidine (PRECEDEX) 400 mcg in 100 mL D5W infusion    • meperidine (DEMEROL) injection 25 mg    • metoclopramide (REGLAN) injection 10 mg    • midazolam (VERSED) injection 2 mg    • dextrose (GLUTOSE) oral gel 15 g    • dextrose (D50W) (25 g/50 mL) IV injection 25 g    • glucagon (human recombinant) (GLUCAGEN DIAGNOSTIC) injection 1 mg    • insulin lispro (ADMELOG) injection 0-7 Units    • phenylephrine (JEREMIAH-SYNEPHRINE) 50 mg in 250 mL NS infusion    • niCARdipine (CARDENE) 25 mg in 250 mL NS infusion kit    • norepinephrine (LEVOPHED) 8 mg in 250 mL NS infusion (premix)    • milrinone (PRIMACOR) 20 mg in 100 mL D5W infusion    • morphine injection 4 mg    • metoprolol tartrate (LOPRESSOR) tablet 12.5 mg    • amiodarone 360 mg in 200 mL D5W infusion    • dextrose 5 % and sodium chloride 0.9 % infusion 80 mL    • sennosides-docusate (PERICOLACE) 8.6-50 MG per tablet 2 tablet    • docusate sodium (COLACE) capsule 100 mg    • bisacodyl (DULCOLAX) suppository 10 mg    • potassium chloride (K-DUR,KLOR-CON) ER tablet 40 mEq    • potassium chloride (KLOR-CON) packet 40 mEq    • potassium chloride 10 mEq in 100 mL IVPB    • potassium chloride 10 mEq in 100 mL IVPB    • magnesium hydroxide (MILK OF MAGNESIA) suspension 10 mL    • metoprolol tartrate (LOPRESSOR) tablet 25 mg    • metoprolol tartrate (LOPRESSOR)  tablet 50 mg    • metoprolol tartrate (LOPRESSOR) tablet 25 mg    • morphine injection 1 mg    • naloxone (NARCAN) injection 0.4 mg    • sodium chloride 0.9 % infusion    • atorvastatin (LIPITOR) tablet 40 mg    • guaiFENesin (MUCINEX) 12 hr tablet 1,200 mg    • dextrose 5 % and sodium chloride 0.9 % infusion    • montelukast (SINGULAIR) tablet 10 mg    • tamsulosin (FLOMAX) 24 hr capsule 0.4 mg    • calcium gluconate 1g/50ml 0.675% NaCl IV SOLN    • acetaminophen (OFIRMEV) injection 1,000 mg    • sodium chloride 0.45 % 925 mL with sodium bicarbonate 8.4 % 75 mEq infusion    • Enoxaparin Sodium (LOVENOX) syringe 40 mg    • furosemide (LASIX) tablet 20 mg    • potassium chloride (K-DUR,KLOR-CON) ER tablet 10 mEq    • losartan (COZAAR) tablet 25 mg    • Lidocaine HCl gel (XYLOCAINE) urethral/mucosal syringe    • furosemide (LASIX) tablet 40 mg    • losartan (COZAAR) tablet 50 mg        Assessment & Plan     77 year old male with hx of BPH s/p TURP many years ago who underwent and CABG 2 weeks ago c/b intraop difficulty placing a catheter secondary to significant regrowth of prostate with dystrophic calcifications who is now having gross hematuria      CAD (coronary artery disease)    CAD (coronary artery disease), native coronary artery        Plan     - keep Pt's last urination in urinal to quantify the hematuria  - obtain PVR to ensure emptying well  - UA with blood and some WBC with culture demonstrating 25k GNR; would treat as infection in setting of gross hematuria; recommend bactrim for 5 days  - will defer holding eliquis to cardiology but would recommend for 3-5 days  - Pt restarted on flomax; given regrowth seen on cysto; and with the bleeding; would start finasteride; continue flomax and finasteride on discharge  - if PVR is low; hematuria is minimal; Pt should be appropriate for discharge on Abx, flomax, finasteride with follow up in our office  - attending addendum to follow    Russel Asif,  MD  09/08/22  09:52 EDT

## 2022-09-08 NOTE — CASE MANAGEMENT/SOCIAL WORK
Continued Stay Note  [facility]     Patient Name: Chaitanya Montesinos  MRN: [id number]  Today's Date: 9/8/2022    Admit Date: 8/25/2022     Discharge Plan     Row Name 09/08/22 0913       Plan    Plan Home w/ [facility]    Plan Comments [facility] requested a P2P for SNF evaluation.  Dr. Nieto reviewed Pt chart and since Pt ambulating 250 feet and performed 6 stair steps- he feels the P2P would be unsuccessful with that much independence.  CCP went to speak with Pt and daughter at bedside to inform them of MD decision.  Pt advised, he wants to d/c home.  Johnna is following for [facility].  CCP following.......Missy BRITO/KYM CM               Discharge Codes    No documentation.               Expected Discharge Date and Time     Expected Discharge Date Expected Discharge Time    Sep 8, 2022             Missy Botello RN

## 2022-09-08 NOTE — PLAN OF CARE
Goal Outcome Evaluation:  Plan of Care Reviewed With: patient        Progress: improving    Diuretic in Use: Bumex 2mg PO daily   Response to Diuretics (Output greater than intake): yes, intake 1040 & output 1070  Daily Weight (up or down): down 7lbs   O2 Requirements: RA  Functional Status (Activity level, tolerance and respiratory symptoms): up with SB assist & walker   Discharge Plans: home, TBD     Pt. POD 13 s/p CABG x4. All VSS. No c/o pain. Hematuria this am, urology re-consulted. Proscar and Bactrim initiated. Holding eliquis per urology. Ambulating with staff and family multiple times today. At least 600ft. Possible d/c home tomorrow with HH. WCTM.

## 2022-09-08 NOTE — PROGRESS NOTES
LOS: 14 days   Patient Care Team:  Keely Lo APRN as PCP - General (Family Medicine)  Jeffrey Inman MD as Consulting Physician (Cardiology)    Chief Complaint: post op    Subjective      Vital Signs  Temp:  [97.1 °F (36.2 °C)-98.3 °F (36.8 °C)] 97.9 °F (36.6 °C)  Heart Rate:  [65-70] 65  Resp:  [18-20] 18  BP: (133-155)/(66-78) 144/72  Body mass index is 34.56 kg/m².    Intake/Output Summary (Last 24 hours) at 9/8/2022 0752  Last data filed at 9/8/2022 0639  Gross per 24 hour   Intake 1610 ml   Output 3745 ml   Net -2135 ml     No intake/output data recorded.    Chest tube drainage last 8 hours         09/06/22  0600 09/07/22  0659 09/08/22  0417   Weight: 135 kg (296 lb 14.4 oz) 135 kg (298 lb 4.8 oz) 132 kg (291 lb 8 oz)         Objective    Results Review:        WBC WBC   Date Value Ref Range Status   09/08/2022 10.89 (H) 3.40 - 10.80 10*3/mm3 Final   09/07/2022 12.30 (H) 3.40 - 10.80 10*3/mm3 Final   09/06/2022 10.59 3.40 - 10.80 10*3/mm3 Final      HGB Hemoglobin   Date Value Ref Range Status   09/08/2022 11.5 (L) 13.0 - 17.7 g/dL Final   09/07/2022 11.8 (L) 13.0 - 17.7 g/dL Final   09/06/2022 11.3 (L) 13.0 - 17.7 g/dL Final      HCT Hematocrit   Date Value Ref Range Status   09/08/2022 34.4 (L) 37.5 - 51.0 % Final   09/07/2022 34.7 (L) 37.5 - 51.0 % Final   09/06/2022 33.8 (L) 37.5 - 51.0 % Final      Platelets Platelets   Date Value Ref Range Status   09/08/2022 247 140 - 450 10*3/mm3 Final   09/07/2022 308 140 - 450 10*3/mm3 Final   09/06/2022 283 140 - 450 10*3/mm3 Final        PT/INR:  No results found for: PROTIME/No results found for: INR    Sodium Sodium   Date Value Ref Range Status   09/08/2022 139 136 - 145 mmol/L Final   09/06/2022 141 136 - 145 mmol/L Final      Potassium Potassium   Date Value Ref Range Status   09/08/2022 3.6 3.5 - 5.2 mmol/L Final   09/06/2022 3.8 3.5 - 5.2 mmol/L Final     Comment:     Slight hemolysis detected by analyzer. Results may be affected.      Chloride  Chloride   Date Value Ref Range Status   09/08/2022 103 98 - 107 mmol/L Final   09/06/2022 110 (H) 98 - 107 mmol/L Final      Bicarbonate CO2   Date Value Ref Range Status   09/08/2022 27.0 22.0 - 29.0 mmol/L Final   09/06/2022 19.7 (L) 22.0 - 29.0 mmol/L Final      BUN BUN   Date Value Ref Range Status   09/08/2022 11 8 - 23 mg/dL Final   09/06/2022 19 8 - 23 mg/dL Final      Creatinine Creatinine   Date Value Ref Range Status   09/08/2022 0.90 0.76 - 1.27 mg/dL Final   09/06/2022 1.03 0.76 - 1.27 mg/dL Final      Calcium Calcium   Date Value Ref Range Status   09/08/2022 8.1 (L) 8.6 - 10.5 mg/dL Final   09/06/2022 8.1 (L) 8.6 - 10.5 mg/dL Final      Magnesium No results found for: MG       apixaban, 5 mg, Oral, Q12H  aspirin, 81 mg, Oral, Daily  atenolol, 25 mg, Oral, Q12H  atorvastatin, 10 mg, Oral, Nightly  bumetanide, 2 mg, Oral, Daily  chlorhexidine, 15 mL, Mouth/Throat, Q12H  losartan, 50 mg, Oral, Q24H  montelukast, 10 mg, Oral, Nightly  mupirocin, , Each Nare, BID  pantoprazole, 40 mg, Oral, QAM  polyethylene glycol, 17 g, Oral, Daily  potassium chloride, 20 mEq, Oral, Daily  simethicone, 80 mg, Oral, 4x Daily  tamsulosin, 0.4 mg, Oral, Daily               Patient Active Problem List   Diagnosis Code   • OA (osteoarthritis) of knee M17.10   • CAD (coronary artery disease) I25.10   • CAD (coronary artery disease), native coronary artery I25.10       Assessment & Plan    -Severe multivessel CAD- s/p CABGx4 LIMA/LSVG- [place]- POD#13  -VIRA on CKD stage II, BL creatinine 1.2-1.4----improved fxn, renal signed off  -Gout  -Hypertension  -Leukocytosis- probable reactive  -Regrowth of prostate with dystrophic calcifications--cystoscopy in the OR  - urinary retention  -post op atrial fibrillation-will need AC whenever appropriate  -Colonic ileus-general surgery following        WBC improving 10 today  Urology asked to see him again with gross hematuria  Great response to PO bumex- down 8lbs  On room air--  tolerating   Making good progress  May be ready to go home with home health tomorrow pending progress  Continue routine care         ROSEY Lake  09/08/22  07:52 EDT

## 2022-09-08 NOTE — DISCHARGE SUMMARY
Date of Admission:   Date of Discharge:  9/9/2022    Discharge Diagnosis:   -Severe multivessel CAD- s/p CABGx4 LIMA/LSVG- [place]  -VIRA on CKD stage II, BL creatinine 1.2-1.4----improved fxn, renal signed off  -Gout  -Hypertension  -Leukocytosis- probable reactive  -Regrowth of prostate with dystrophic calcifications--cystoscopy in the OR  - urinary retention  -post op atrial fibrillation-no AC with gross hematuria  -Colonic ileus-resolved        Presenting Problem/History of Present Illness  CAD (coronary artery disease), native coronary artery [I25.10]     Hospital Course  Patient is a 77 y.o. male presented for cardiac surgery on 8/26 he underwent CABG x4.  Skeletonized LIMA to proximal LAD.  Vein graft to PDA.  Vein graft to acute marginal branch of right coronary artery.  Vein graft to OM1.  Temporary cardiopulmonary bypass.  Antegrade and retrograde cold blood cardioplegia with warm reperfusion.  Neurologic monitoring.  Transesophageal echo.  Endoscopic vein harvest left greater saphenous vein by Dr. Ruiz (see op report for full details).  His post operative course was complicated.  Intraoperatively we were unable to anchor a foss, urology was consulted cystoscopy and catheter placed in the OR.  He had a history of a turp procedure and had regrowth of his prostate with dystrophic calcifications.  Post op day 1 he was weaned from inotropic and pressor support.  He has chronic kidney disease and had a bump in creatinine, nephrology consulted.  His creatinine resolved. his foss was removed.  He continued to have on and off urinary retention and flomax started.  He had atrial fibrillation for several days. On post operative day 4 he developed an ileus.  General surgery consulted for management. He received 2 doses of neostigmine and bowel rest and this resolved. All lines tubes and wires removed without issue. He converted to sinus rhythm but due to the amount of time in afib he was started on eliquis for  anticoagulation.  He then developed gross hematuria.  Urology was reconsulted for advice and management.  The eliquis was stopped due to the risk of bleeding being higher than the risk of stroke after he has remained in sinus rhythm for several days.  He will be sent home with foss catheter in place, bactrim for 5 days, flomax and proscar.  He will follow up with urology as an outpatient. Follow up appointments as below. Patient was provided with appropriate discharge education. He  was instructed to call office with any questions or concerns.      Procedures Performed  Procedure(s):  MEL STERNOTOMY CORONARY ARTERY BYPASS GRAFT TIMES 4 USING LEFT INTERNAL MAMMARY ARTERY AND LEFT GREATER SAPHENOUS VEIN GRAFT PER ENDOSCOPIC VEIN HARVESTING AND PRP  CYSTOSCOPY WITH FOSS CATHETER PLACEMENT       Consults:   Consults     Date and Time Order Name Status Description    9/8/2022 12:30 AM Inpatient Urology Consult      8/30/2022 12:58 PM Inpatient General Surgery Consult Completed     8/30/2022 10:54 AM Inpatient Nephrology Consult      8/26/2022 10:45 AM Inpatient Urology Consult            Pertinent Test Results:    Lab Results   Component Value Date    WBC 10.10 09/09/2022    HGB 11.0 (L) 09/09/2022    HCT 33.2 (L) 09/09/2022    MCV 95.1 09/09/2022     09/09/2022      Lab Results   Component Value Date    GLUCOSE 130 (H) 09/08/2022    CALCIUM 8.8 09/08/2022     09/08/2022    K 3.7 09/08/2022    K 3.7 09/08/2022    CO2 28.0 09/08/2022     09/08/2022    BUN 13 09/08/2022    CREATININE 1.06 09/08/2022    EGFRIFNONA 62 11/29/2017    BCR 12.3 09/08/2022    ANIONGAP 11.0 09/08/2022     Lab Results   Component Value Date    INR 1.27 (H) 08/27/2022    PROTIME 15.7 (H) 08/27/2022         Condition on Discharge:  stable    Vital Signs  Temp:  [97.7 °F (36.5 °C)-98.4 °F (36.9 °C)] 98.4 °F (36.9 °C)  Heart Rate:  [60-77] 65  Resp:  [18] 18  BP: (125-151)/(70-81) 125/81      Discharge Disposition  Home-Health Care  Svc    Discharge Medications     Discharge Medications      New Medications      Instructions Start Date   amLODIPine 5 MG tablet  Commonly known as: NORVASC   5 mg, Oral, Every 24 Hours Scheduled   Start Date: September 10, 2022     bumetanide 2 MG tablet  Commonly known as: BUMEX   2 mg, Oral, Daily   Start Date: September 10, 2022     finasteride 5 MG tablet  Commonly known as: PROSCAR   5 mg, Oral, Daily   Start Date: September 10, 2022     HYDROcodone-acetaminophen 5-325 MG per tablet  Commonly known as: Norco   1 tablet, Oral, Every 4 Hours PRN      losartan 100 MG tablet  Commonly known as: COZAAR   100 mg, Oral, Every 24 Hours Scheduled   Start Date: September 10, 2022     montelukast 10 MG tablet  Commonly known as: SINGULAIR   10 mg, Oral, Nightly      pantoprazole 40 MG EC tablet  Commonly known as: PROTONIX   40 mg, Oral, Every Morning   Start Date: September 10, 2022     polyethylene glycol 17 GM/SCOOP powder  Commonly known as: MIRALAX   Dissolve 17 g (1 capful) in liquid and drink by mouth Daily As Needed (constipation).      potassium chloride 20 MEQ CR tablet  Commonly known as: K-DUR,KLOR-CON   20 mEq, Oral, Daily   Start Date: September 10, 2022     sulfamethoxazole-trimethoprim 800-160 MG per tablet  Commonly known as: BACTRIM DS,SEPTRA DS   1 tablet, Oral, Every 12 Hours Scheduled      tamsulosin 0.4 MG capsule 24 hr capsule  Commonly known as: FLOMAX   0.4 mg, Oral, Daily   Start Date: September 10, 2022        Changes to Medications      Instructions Start Date   aspirin 81 MG EC tablet  What changed:   · medication strength  · how much to take  · when to take this  · Another medication with the same name was removed. Continue taking this medication, and follow the directions you see here.   81 mg, Oral, Daily   Start Date: September 10, 2022     atenolol 50 MG tablet  Commonly known as: TENORMIN  What changed:   · medication strength  · how much to take  · when to take this   50 mg, Oral,  Every 12 Hours Scheduled         Continue These Medications      Instructions Start Date   allopurinol 300 MG tablet  Commonly known as: ZYLOPRIM   400 mg, Oral, Daily      atorvastatin 10 MG tablet  Commonly known as: LIPITOR   10 mg, Oral, Every Evening      docusate sodium 100 MG capsule   100 mg, Oral, 2 Times Daily PRN      fluticasone 50 MCG/ACT nasal spray  Commonly known as: FLONASE   2 sprays, Nasal, As Needed      guaiFENesin 400 MG tablet  Commonly known as: HUMIBID 3   400 mg, Oral, 2 Times Daily      multivitamin with minerals tablet tablet   1 tablet, Oral, Daily      probenecid 500 MG tablet  Commonly known as: BENEMID   500 mg, Oral, 2 Times Daily, GOUT      valACYclovir 500 MG tablet  Commonly known as: VALTREX   500 mg, Oral, As Needed         Stop These Medications    acetaminophen 500 MG tablet  Commonly known as: TYLENOL     hydroCHLOROthiazide 25 MG tablet  Commonly known as: HYDRODIURIL     oxyCODONE-acetaminophen 5-325 MG per tablet  Commonly known as: PERCOCET     ramipril 10 MG capsule  Commonly known as: ALTACE            Discharge Diet:  1. No driving for 2 weeks and off narcotic pain medications.  2. Shower daily. Clean incisions with warm water and antibacterial soap only. Do not put any lotion or ointments on incisions.  3. Ambulate for 10 minutes at least 3 times a day.  4. No heavy lifting > 10lbs until seen in office.   5. Take all medications as prescribed.     Activity at Discharge:     Follow-up Appointments  Future Appointments   Date Time Provider Department Center   10/5/2022  1:00 PM Brigitte Givens APRN [facility]     Additional Instructions for the Follow-ups that You Need to Schedule     Ambulatory Referral to Cardiac Rehab   As directed      Ambulatory Referral to Home Health   As directed      Face to Face Visit Date: 9/9/2022    Follow-up provider for Plan of Care?: I will be treating the patient on an ongoing basis.  Please send me the Plan of Care for signature.     Follow-up provider: JR DONNIE NEAL [[id number]]    Reason/Clinical Findings: post op open heart    Describe mobility limitations that make leaving home difficult: weakness    Nursing/Therapeutic Services Requested: Skilled Nursing Physical Therapy    Skilled nursing orders: Post CABG care    PT orders: Strengthening    Frequency: 1 Week 1         Call MD With Problems / Concerns   As directed      Instructions:  Call office at [phone] for any drainage, increased redness, or fever over 100.5    Order Comments: Instructions:  Call office at [phone] for any drainage, increased redness, or fever over 100.5          Discharge Follow-up with PCP   As directed       Currently Documented PCP:    Keely Lo APRN    PCP Phone Number:    [phone]     Follow Up Details: in 1 week         Discharge Follow-up with Specified Provider: Cardiologist; 2 Weeks   As directed      To: Cardiologist    Follow Up: 2 Weeks    Follow Up Details: call for appointment, bring all medication bottles to appointment         Discharge Follow-up with Specified Provider: Juliane CURRIE on 10/5 1pm   As directed      To: Juliane CURRIE on 10/5 1pm    Follow Up Details: 4-6 weeks, bring all current medications to appointment         Discharge Follow-up with Specified Provider: urology; 1 Week   As directed      To: urology    Follow Up: 1 Week    Follow Up Details: call for appointment, bring all medication bottles to appointment               Test Results Pending at Discharge       ROSEY Lake  09/09/22  11:24 EDT

## 2022-09-08 NOTE — CASE MANAGEMENT/SOCIAL WORK
"Continued Stay Note  [facility]     Patient Name: Chaitanya Montesinos  MRN: [id number]  Today's Date: 9/8/2022    Admit Date: 8/25/2022     Discharge Plan     Row Name 09/08/22 1334       Plan    Plan Home w/ Amarjit     Plan Comments Received a voicemail from Niki Macias RN and she advised that since the P2P was denied Pt can call [phone] and file an, \"expidited appeal\".  [facility] spoke with Pt, daughter and wife (wife on speaker phone) and offered the Expidited appeal information.  Wife is really wanting Pt to go to rehab at d/c.  Physical Therapy has signed off.  Pt voiced to [facility] many times he does not want to go to rehab and he does not want to submit an appeal.  Wife advised she does not want the appeal information.  [facility] went ahead and gave the appeal phone number to Cely, toni.  [facility] spoke with Johnna/Amarjit  ([phone]) and she confirmed they will follow PT at discharge.  CCP following.......Missy BRITO/KYM CM               Discharge Codes    No documentation.               Expected Discharge Date and Time     Expected Discharge Date Expected Discharge Time    Sep 8, 2022             Missy Botello RN    "

## 2022-09-09 ENCOUNTER — READMISSION MANAGEMENT (OUTPATIENT)
Dept: CALL CENTER | Facility: HOSPITAL | Age: 77
End: 2022-09-09

## 2022-09-09 VITALS
BODY MASS INDEX: 33.93 KG/M2 | DIASTOLIC BLOOD PRESSURE: 57 MMHG | HEART RATE: 67 BPM | WEIGHT: 287.4 LBS | SYSTOLIC BLOOD PRESSURE: 118 MMHG | TEMPERATURE: 97.5 F | RESPIRATION RATE: 18 BRPM | OXYGEN SATURATION: 94 % | HEIGHT: 77 IN

## 2022-09-09 LAB
BACTERIA SPEC AEROBE CULT: ABNORMAL
BASOPHILS # BLD AUTO: 0.05 10*3/MM3 (ref 0–0.2)
BASOPHILS NFR BLD AUTO: 0.5 % (ref 0–1.5)
DEPRECATED RDW RBC AUTO: 45.7 FL (ref 37–54)
EOSINOPHIL # BLD AUTO: 0.35 10*3/MM3 (ref 0–0.4)
EOSINOPHIL NFR BLD AUTO: 3.5 % (ref 0.3–6.2)
ERYTHROCYTE [DISTWIDTH] IN BLOOD BY AUTOMATED COUNT: 13.4 % (ref 12.3–15.4)
HCT VFR BLD AUTO: 33.2 % (ref 37.5–51)
HGB BLD-MCNC: 11 G/DL (ref 13–17.7)
IMM GRANULOCYTES # BLD AUTO: 0.1 10*3/MM3 (ref 0–0.05)
IMM GRANULOCYTES NFR BLD AUTO: 1 % (ref 0–0.5)
LYMPHOCYTES # BLD AUTO: 1.15 10*3/MM3 (ref 0.7–3.1)
LYMPHOCYTES NFR BLD AUTO: 11.4 % (ref 19.6–45.3)
MCH RBC QN AUTO: 31.5 PG (ref 26.6–33)
MCHC RBC AUTO-ENTMCNC: 33.1 G/DL (ref 31.5–35.7)
MCV RBC AUTO: 95.1 FL (ref 79–97)
MONOCYTES # BLD AUTO: 1.15 10*3/MM3 (ref 0.1–0.9)
MONOCYTES NFR BLD AUTO: 11.4 % (ref 5–12)
NEUTROPHILS NFR BLD AUTO: 7.3 10*3/MM3 (ref 1.7–7)
NEUTROPHILS NFR BLD AUTO: 72.2 % (ref 42.7–76)
NRBC BLD AUTO-RTO: 0.1 /100 WBC (ref 0–0.2)
PLATELET # BLD AUTO: 225 10*3/MM3 (ref 140–450)
PMV BLD AUTO: 9.9 FL (ref 6–12)
RBC # BLD AUTO: 3.49 10*6/MM3 (ref 4.14–5.8)
WBC NRBC COR # BLD: 10.1 10*3/MM3 (ref 3.4–10.8)

## 2022-09-09 PROCEDURE — 94799 UNLISTED PULMONARY SVC/PX: CPT

## 2022-09-09 PROCEDURE — 99024 POSTOP FOLLOW-UP VISIT: CPT | Performed by: NURSE PRACTITIONER

## 2022-09-09 PROCEDURE — 85025 COMPLETE CBC W/AUTO DIFF WBC: CPT | Performed by: PHYSICIAN ASSISTANT

## 2022-09-09 RX ORDER — FINASTERIDE 5 MG/1
5 TABLET, FILM COATED ORAL DAILY
Qty: 30 TABLET | Refills: 2 | Status: SHIPPED | OUTPATIENT
Start: 2022-09-10 | End: 2022-12-09

## 2022-09-09 RX ORDER — BUMETANIDE 2 MG/1
2 TABLET ORAL DAILY
Qty: 30 TABLET | Refills: 0 | Status: SHIPPED | OUTPATIENT
Start: 2022-09-10 | End: 2022-10-05

## 2022-09-09 RX ORDER — ATENOLOL 50 MG/1
50 TABLET ORAL EVERY 12 HOURS SCHEDULED
Qty: 60 TABLET | Refills: 2 | Status: SHIPPED | OUTPATIENT
Start: 2022-09-09 | End: 2022-12-08

## 2022-09-09 RX ORDER — SULFAMETHOXAZOLE AND TRIMETHOPRIM 800; 160 MG/1; MG/1
1 TABLET ORAL EVERY 12 HOURS SCHEDULED
Qty: 7 TABLET | Refills: 0 | Status: SHIPPED | OUTPATIENT
Start: 2022-09-09 | End: 2022-09-13

## 2022-09-09 RX ORDER — HYDROCODONE BITARTRATE AND ACETAMINOPHEN 5; 325 MG/1; MG/1
1 TABLET ORAL EVERY 4 HOURS PRN
Qty: 42 TABLET | Refills: 0 | Status: SHIPPED | OUTPATIENT
Start: 2022-09-09 | End: 2022-09-16

## 2022-09-09 RX ORDER — MONTELUKAST SODIUM 10 MG/1
10 TABLET ORAL NIGHTLY
Qty: 30 TABLET | Refills: 2 | Status: SHIPPED | OUTPATIENT
Start: 2022-09-09 | End: 2022-12-08

## 2022-09-09 RX ORDER — LOSARTAN POTASSIUM 100 MG/1
100 TABLET ORAL
Qty: 30 TABLET | Refills: 2 | Status: SHIPPED | OUTPATIENT
Start: 2022-09-10 | End: 2022-12-09

## 2022-09-09 RX ORDER — LIDOCAINE HYDROCHLORIDE 20 MG/ML
JELLY TOPICAL AS NEEDED
Status: DISCONTINUED | OUTPATIENT
Start: 2022-09-09 | End: 2022-09-09 | Stop reason: HOSPADM

## 2022-09-09 RX ORDER — PANTOPRAZOLE SODIUM 40 MG/1
40 TABLET, DELAYED RELEASE ORAL EVERY MORNING
Qty: 30 TABLET | Refills: 2 | Status: SHIPPED | OUTPATIENT
Start: 2022-09-10 | End: 2022-12-09

## 2022-09-09 RX ORDER — AMLODIPINE BESYLATE 5 MG/1
5 TABLET ORAL
Status: DISCONTINUED | OUTPATIENT
Start: 2022-09-09 | End: 2022-09-09 | Stop reason: HOSPADM

## 2022-09-09 RX ORDER — ASPIRIN 81 MG/1
81 TABLET ORAL DAILY
Qty: 30 TABLET | Refills: 2 | Status: SHIPPED | OUTPATIENT
Start: 2022-09-10 | End: 2022-12-09

## 2022-09-09 RX ORDER — POLYETHYLENE GLYCOL 3350 17 G/17G
17 POWDER, FOR SOLUTION ORAL DAILY PRN
Qty: 238 G | Refills: 3 | Status: SHIPPED | OUTPATIENT
Start: 2022-09-09

## 2022-09-09 RX ORDER — POTASSIUM CHLORIDE 20 MEQ/1
20 TABLET, EXTENDED RELEASE ORAL DAILY
Qty: 30 TABLET | Refills: 0 | Status: SHIPPED | OUTPATIENT
Start: 2022-09-10 | End: 2022-10-05

## 2022-09-09 RX ORDER — TAMSULOSIN HYDROCHLORIDE 0.4 MG/1
0.4 CAPSULE ORAL DAILY
Qty: 30 CAPSULE | Refills: 2 | Status: SHIPPED | OUTPATIENT
Start: 2022-09-10 | End: 2022-12-09

## 2022-09-09 RX ORDER — AMLODIPINE BESYLATE 5 MG/1
5 TABLET ORAL
Qty: 30 TABLET | Refills: 2 | Status: SHIPPED | OUTPATIENT
Start: 2022-09-10 | End: 2022-12-09

## 2022-09-09 RX ADMIN — PANTOPRAZOLE SODIUM 40 MG: 40 TABLET, DELAYED RELEASE ORAL at 06:16

## 2022-09-09 RX ADMIN — FINASTERIDE 5 MG: 5 TABLET, FILM COATED ORAL at 08:37

## 2022-09-09 RX ADMIN — LOSARTAN POTASSIUM 100 MG: 100 TABLET, FILM COATED ORAL at 08:36

## 2022-09-09 RX ADMIN — POTASSIUM CHLORIDE 20 MEQ: 750 TABLET, EXTENDED RELEASE ORAL at 08:36

## 2022-09-09 RX ADMIN — AMLODIPINE BESYLATE 5 MG: 5 TABLET ORAL at 08:36

## 2022-09-09 RX ADMIN — ASPIRIN 81 MG: 81 TABLET, COATED ORAL at 08:37

## 2022-09-09 RX ADMIN — MUPIROCIN: 20 OINTMENT TOPICAL at 12:49

## 2022-09-09 RX ADMIN — BUMETANIDE 2 MG: 2 TABLET ORAL at 08:37

## 2022-09-09 RX ADMIN — SIMETHICONE 80 MG: 80 TABLET, CHEWABLE ORAL at 08:36

## 2022-09-09 RX ADMIN — SIMETHICONE 80 MG: 80 TABLET, CHEWABLE ORAL at 13:49

## 2022-09-09 RX ADMIN — CHLORHEXIDINE GLUCONATE 15 ML: 1.2 SOLUTION ORAL at 08:37

## 2022-09-09 RX ADMIN — LIDOCAINE HYDROCHLORIDE: 20 JELLY TOPICAL at 12:43

## 2022-09-09 RX ADMIN — ATENOLOL 50 MG: 50 TABLET ORAL at 08:36

## 2022-09-09 RX ADMIN — TAMSULOSIN HYDROCHLORIDE 0.4 MG: 0.4 CAPSULE ORAL at 08:36

## 2022-09-09 RX ADMIN — SULFAMETHOXAZOLE AND TRIMETHOPRIM 1 TABLET: 800; 160 TABLET ORAL at 08:36

## 2022-09-09 NOTE — PROGRESS NOTES
FU for gross hematuria, urinary retention    Patient reports passing dark red blood yesterday. He passed clots and sometimes had difficulty starting his stream. Eliquis is currently on hold, last dose Wednesday morning.    AVSS  UO 1850  NAD, A&Ox3  Hard at hearing  Last urine output in the urinal: melissa colored urine with maroon-colored sediment    PVR 55 cc    Plan:  Difficult situation. I discussed with the family that this will likely resolve with holding anticoagulation but can restart in the future. We discussed placement of a catheter, which can tamponade bleeding from the prostate and alleviate difficulty urinating. THe patient would like to have a catheter placed.  - continue to hold Eliquis  - finasteride  - place 18 Fr coude catheter  - keep NPO, as we may need to perform cystoscopy and clot evacuation in the OR

## 2022-09-09 NOTE — PROGRESS NOTES
LOS: 15 days   Patient Care Team:  Keely Lo APRN as PCP - General (Family Medicine)  Jeffrey Inman MD as Consulting Physician (Cardiology)    Chief Complaint: post op    Subjective      Vital Signs  Temp:  [97.7 °F (36.5 °C)-98.4 °F (36.9 °C)] 98.4 °F (36.9 °C)  Heart Rate:  [60-77] 65  Resp:  [18] 18  BP: (125-151)/(66-81) 125/81  Body mass index is 34.07 kg/m².    Intake/Output Summary (Last 24 hours) at 9/9/2022 0908  Last data filed at 9/9/2022 0853  Gross per 24 hour   Intake 720 ml   Output 1850 ml   Net -1130 ml     I/O this shift:  In: 240 [P.O.:240]  Out: -             09/07/22  0659 09/08/22  0417 09/09/22  0600   Weight: 135 kg (298 lb 4.8 oz) 132 kg (291 lb 8 oz) 130 kg (287 lb 6.4 oz)         Objective    Results Review:        WBC WBC   Date Value Ref Range Status   09/09/2022 10.10 3.40 - 10.80 10*3/mm3 Final   09/08/2022 10.89 (H) 3.40 - 10.80 10*3/mm3 Final   09/07/2022 12.30 (H) 3.40 - 10.80 10*3/mm3 Final      HGB Hemoglobin   Date Value Ref Range Status   09/09/2022 11.0 (L) 13.0 - 17.7 g/dL Final   09/08/2022 11.5 (L) 13.0 - 17.7 g/dL Final   09/07/2022 11.8 (L) 13.0 - 17.7 g/dL Final      HCT Hematocrit   Date Value Ref Range Status   09/09/2022 33.2 (L) 37.5 - 51.0 % Final   09/08/2022 34.4 (L) 37.5 - 51.0 % Final   09/07/2022 34.7 (L) 37.5 - 51.0 % Final      Platelets Platelets   Date Value Ref Range Status   09/09/2022 225 140 - 450 10*3/mm3 Final   09/08/2022 247 140 - 450 10*3/mm3 Final   09/07/2022 308 140 - 450 10*3/mm3 Final        PT/INR:  No results found for: PROTIME/No results found for: INR    Sodium Sodium   Date Value Ref Range Status   09/08/2022 140 136 - 145 mmol/L Final   09/08/2022 139 136 - 145 mmol/L Final      Potassium Potassium   Date Value Ref Range Status   09/08/2022 3.7 3.5 - 5.2 mmol/L Final   09/08/2022 3.7 3.5 - 5.2 mmol/L Final   09/08/2022 3.6 3.5 - 5.2 mmol/L Final      Chloride Chloride   Date Value Ref Range Status   09/08/2022 101 98 - 107 mmol/L  Final   09/08/2022 103 98 - 107 mmol/L Final      Bicarbonate CO2   Date Value Ref Range Status   09/08/2022 28.0 22.0 - 29.0 mmol/L Final   09/08/2022 27.0 22.0 - 29.0 mmol/L Final      BUN BUN   Date Value Ref Range Status   09/08/2022 13 8 - 23 mg/dL Final   09/08/2022 11 8 - 23 mg/dL Final      Creatinine Creatinine   Date Value Ref Range Status   09/08/2022 1.06 0.76 - 1.27 mg/dL Final   09/08/2022 0.90 0.76 - 1.27 mg/dL Final      Calcium Calcium   Date Value Ref Range Status   09/08/2022 8.8 8.6 - 10.5 mg/dL Final   09/08/2022 8.1 (L) 8.6 - 10.5 mg/dL Final      Magnesium No results found for: MG       amLODIPine, 5 mg, Oral, Q24H  [START ON 9/11/2022] apixaban, 5 mg, Oral, Q12H  aspirin, 81 mg, Oral, Daily  atenolol, 50 mg, Oral, Q12H  atorvastatin, 10 mg, Oral, Nightly  bumetanide, 2 mg, Oral, Daily  chlorhexidine, 15 mL, Mouth/Throat, Q12H  finasteride, 5 mg, Oral, Daily  losartan, 100 mg, Oral, Q24H  montelukast, 10 mg, Oral, Nightly  mupirocin, , Each Nare, BID  pantoprazole, 40 mg, Oral, QAM  polyethylene glycol, 17 g, Oral, Daily  potassium chloride, 20 mEq, Oral, Daily  simethicone, 80 mg, Oral, 4x Daily  sulfamethoxazole-trimethoprim, 1 tablet, Oral, Q12H  tamsulosin, 0.4 mg, Oral, Daily               Patient Active Problem List   Diagnosis Code   • OA (osteoarthritis) of knee M17.10   • CAD (coronary artery disease) I25.10   • CAD (coronary artery disease), native coronary artery I25.10       Assessment & Plan      -Severe multivessel CAD- s/p CABGx4 LIMA/LSVG- [place]- POD#13  -VIRA on CKD stage II, BL creatinine 1.2-1.4----improved fxn, renal signed off  -Gout  -Hypertension  -Leukocytosis- probable reactive  -Regrowth of prostate with dystrophic calcifications--cystoscopy in the OR  - urinary retention  -post op atrial fibrillation-no AC with gross hematuria  -Colonic ileus-general surgery following        WBC normalized  Holding anticoagulation- he is in sinus rhythm and has been for days.   Discussed with Dr. Ruiz with him remaining in sinus rhythm and his gross hematuria the risk of bleeding his higher than the risk of stroke at this point. Will discontinue eliquis  Edema improving, weight down 4lbs  On room air-- tolerating   Making good progress  Plan for catheter placement  I think he could still go home today after foss placed  Follow up with urology as an outpatient  Continue routine care     ROSEY Lake  09/09/22  09:08 EDT

## 2022-09-09 NOTE — NURSING NOTE
Terrazas placed around 1300 per urology order. Terrazas placed with no complications. Manually irrigated throughout shift and urine getting light in color. Now urine is light pink/ organge color. Urology paged to update urine characteristics. Possible d/c home today once cleared by urology.

## 2022-09-09 NOTE — CASE MANAGEMENT/SOCIAL WORK
Continued Stay Note  [facility]     Patient Name: Chaitanya Montesinos  MRN: [id number]  Today's Date: 9/9/2022    Admit Date: 8/25/2022     Discharge Plan     Row Name 09/09/22 1627       Plan    Plan Home w/ [facility].  CCP notified glen Oropeza that Pt has d/c orders home.    Plan Comments CCP called [facility] to cancel the need for rolling walker and raised commode.  Pt stated he already has a rolling walker and a 3:1 commode at home.  Pt report if he ends up needing a raised commode he will purchase one outright at department store.  Rose Marie quoted CCP over $60.00 for raised commode private pay (Rose Marie stated ins does not cover cost). CCP notified Johnna/[facility] that Pt has d/c orders.......SS/CCP               Discharge Codes    No documentation.               Expected Discharge Date and Time     Expected Discharge Date Expected Discharge Time    Sep 9, 2022             Missy Botello RN

## 2022-09-09 NOTE — CASE MANAGEMENT/SOCIAL WORK
Continued Stay Note  [facility]     Patient Name: Chaitanya Montesinos  MRN: [id number]  Today's Date: 9/9/2022    Admit Date: 8/25/2022     Discharge Plan     Row Name 09/09/22 1147       Plan    Plan Home w/ [facility];  Vinod eval pending for raised toilet seat and rolling walker.    Plan Comments Notified Rose Marie/Vinod of order for Rolling walker and raised toilet seat.  CCP following......Missy BRITO/KYM CM               Discharge Codes    No documentation.               Expected Discharge Date and Time     Expected Discharge Date Expected Discharge Time    Sep 9, 2022             Missy Botello RN

## 2022-09-09 NOTE — DISCHARGE SUMMARY
Date of Admission:   Date of Discharge:  9/9/2022     Discharge Diagnosis:   -Severe multivessel CAD- s/p CABGx4 LIMA/LSVG- [place]  -VIRA on CKD stage II, BL creatinine 1.2-1.4----improved fxn, renal signed off  -Gout  -Hypertension  -Leukocytosis- probable reactive  -Regrowth of prostate with dystrophic calcifications--cystoscopy in the OR  - urinary retention  -post op atrial fibrillation-no AC with gross hematuria  -Colonic ileus-resolved         Presenting Problem/History of Present Illness  CAD (coronary artery disease), native coronary artery [I25.10]             Hospital Course  Patient is a 77 y.o. male presented for cardiac surgery on 8/26 he underwent CABG x4.  Skeletonized LIMA to proximal LAD.  Vein graft to PDA.  Vein graft to acute marginal branch of right coronary artery.  Vein graft to OM1.  Temporary cardiopulmonary bypass.  Antegrade and retrograde cold blood cardioplegia with warm reperfusion.  Neurologic monitoring.  Transesophageal echo.  Endoscopic vein harvest left greater saphenous vein by Dr. Ruiz (see op report for full details).  His post operative course was complicated.  Intraoperatively we were unable to anchor a foss, urology was consulted cystoscopy and catheter placed in the OR.  He had a history of a turp procedure and had regrowth of his prostate with dystrophic calcifications.  Post op day 1 he was weaned from inotropic and pressor support.  He has chronic kidney disease and had a bump in creatinine, nephrology consulted.  His creatinine resolved. his foss was removed.  He continued to have on and off urinary retention and flomax started.  He had atrial fibrillation for several days. On post operative day 4 he developed an ileus.  General surgery consulted for management. He received 2 doses of neostigmine and bowel rest and this resolved. All lines tubes and wires removed without issue. He converted to sinus rhythm but due to the amount of time in afib he was started on  eliquis for anticoagulation.  He then developed gross hematuria.  Urology was reconsulted for advice and management.  The eliquis was stopped due to the risk of bleeding being higher than the risk of stroke after he has remained in sinus rhythm for several days.  He will be sent home with foss catheter in place, bactrim for 5 days, flomax and proscar.  He will follow up with urology as an outpatient. Follow up appointments as below. Patient was provided with appropriate discharge education. He was instructed to call office with any questions or concerns.        Procedures Performed  Procedure(s):  MEL STERNOTOMY CORONARY ARTERY BYPASS GRAFT TIMES 4 USING LEFT INTERNAL MAMMARY ARTERY AND LEFT GREATER SAPHENOUS VEIN GRAFT PER ENDOSCOPIC VEIN HARVESTING AND PRP  CYSTOSCOPY WITH FOSS CATHETER PLACEMENT     Consults:           Consults      Date and Time Order Name Status Description     9/8/2022 12:30 AM Inpatient Urology Consult         8/30/2022 12:58 PM Inpatient General Surgery Consult Completed       8/30/2022 10:54 AM Inpatient Nephrology Consult         8/26/2022 10:45 AM Inpatient Urology Consult                 Pertinent Test Results:           Lab Results   Component Value Date     WBC 10.10 09/09/2022     HGB 11.0 (L) 09/09/2022     HCT 33.2 (L) 09/09/2022     MCV 95.1 09/09/2022      09/09/2022            Lab Results   Component Value Date     GLUCOSE 130 (H) 09/08/2022     CALCIUM 8.8 09/08/2022      09/08/2022     K 3.7 09/08/2022     K 3.7 09/08/2022     CO2 28.0 09/08/2022      09/08/2022     BUN 13 09/08/2022     CREATININE 1.06 09/08/2022     EGFRIFNONA 62 11/29/2017     BCR 12.3 09/08/2022     ANIONGAP 11.0 09/08/2022            Lab Results   Component Value Date     INR 1.27 (H) 08/27/2022     PROTIME 15.7 (H) 08/27/2022            Condition on Discharge:  stable     Vital Signs  Temp:  [97.7 °F (36.5 °C)-98.4 °F (36.9 °C)] 98.2 °F (36.8 °C)  Heart Rate:  [60-77] 62  Resp:  [18]  18  BP: (125-151)/(61-81) 125/61        Discharge Disposition  Home-Health Care [facility]     Discharge Medications           Discharge Medications            New Medications      Instructions Start Date   amLODIPine 5 MG tablet  Commonly known as: NORVASC    5 mg, Oral, Every 24 Hours Scheduled    Start Date: September 10, 2022      bumetanide 2 MG tablet  Commonly known as: BUMEX    2 mg, Oral, Daily    Start Date: September 10, 2022      finasteride 5 MG tablet  Commonly known as: PROSCAR    5 mg, Oral, Daily    Start Date: September 10, 2022      HYDROcodone-acetaminophen 5-325 MG per tablet  Commonly known as: Norco    1 tablet, Oral, Every 4 Hours PRN        losartan 100 MG tablet  Commonly known as: COZAAR    100 mg, Oral, Every 24 Hours Scheduled    Start Date: September 10, 2022      montelukast 10 MG tablet  Commonly known as: SINGULAIR    10 mg, Oral, Nightly        pantoprazole 40 MG EC tablet  Commonly known as: PROTONIX    40 mg, Oral, Every Morning    Start Date: September 10, 2022      polyethylene glycol 17 GM/SCOOP powder  Commonly known as: MIRALAX    Dissolve 17 g (1 capful) in liquid and drink by mouth Daily As Needed (constipation).        potassium chloride 20 MEQ CR tablet  Commonly known as: K-DUR,KLOR-CON    20 mEq, Oral, Daily    Start Date: September 10, 2022      sulfamethoxazole-trimethoprim 800-160 MG per tablet  Commonly known as: BACTRIM DS,SEPTRA DS    1 tablet, Oral, Every 12 Hours Scheduled        tamsulosin 0.4 MG capsule 24 hr capsule  Commonly known as: FLOMAX    0.4 mg, Oral, Daily    Start Date: September 10, 2022                    Changes to Medications      Instructions Start Date   aspirin 81 MG EC tablet  What changed:   · medication strength  · how much to take  · when to take this  · Another medication with the same name was removed. Continue taking this medication, and follow the directions you see here.    81 mg, Oral, Daily    Start Date: September 10, 2022      atenolol  50 MG tablet  Commonly known as: TENORMIN  What changed:   · medication strength  · how much to take  · when to take this    50 mg, Oral, Every 12 Hours Scheduled                      Continue These Medications      Instructions Start Date   allopurinol 300 MG tablet  Commonly known as: ZYLOPRIM    400 mg, Oral, Daily        atorvastatin 10 MG tablet  Commonly known as: LIPITOR    10 mg, Oral, Every Evening        docusate sodium 100 MG capsule    100 mg, Oral, 2 Times Daily PRN        fluticasone 50 MCG/ACT nasal spray  Commonly known as: FLONASE    2 sprays, Nasal, As Needed        guaiFENesin 400 MG tablet  Commonly known as: HUMIBID 3    400 mg, Oral, 2 Times Daily        multivitamin with minerals tablet tablet    1 tablet, Oral, Daily        probenecid 500 MG tablet  Commonly known as: BENEMID    500 mg, Oral, 2 Times Daily, GOUT        valACYclovir 500 MG tablet  Commonly known as: VALTREX    500 mg, Oral, As Needed            Stop These Medications    acetaminophen 500 MG tablet  Commonly known as: TYLENOL      hydroCHLOROthiazide 25 MG tablet  Commonly known as: HYDRODIURIL      oxyCODONE-acetaminophen 5-325 MG per tablet  Commonly known as: PERCOCET      ramipril 10 MG capsule  Commonly known as: ALTACE                Discharge Diet:  1. No driving for 2 weeks and off narcotic pain medications.  2. Shower daily. Clean incisions with warm water and antibacterial soap only. Do not put any lotion or ointments on incisions.  3. Ambulate for 10 minutes at least 3 times a day.  4. No heavy lifting > 10lbs until seen in office.   5. Take all medications as prescribed.      Activity at Discharge:      Follow-up Appointments         Future Appointments   Date Time Provider Department Center   10/5/2022  1:00 PM Brigitte Givens APRN [facility]           Additional Instructions for the Follow-ups that You Need to Schedule      Ambulatory Referral to Cardiac Rehab   As directed        Ambulatory Referral to Home  Health   As directed        Face to Face Visit Date: 9/9/2022    Follow-up provider for Plan of Care?: I will be treating the patient on an ongoing basis.  Please send me the Plan of Care for signature.    Follow-up provider: JR DONNIE NEAL [[id number]]    Reason/Clinical Findings: post op open heart    Describe mobility limitations that make leaving home difficult: weakness    Nursing/Therapeutic Services Requested: Skilled Nursing Physical Therapy    Skilled nursing orders: Post CABG care    PT orders: Strengthening    Frequency: 1 Week 1           Call MD With Problems / Concerns   As directed        Instructions:  Call office at [phone] for any drainage, increased redness, or fever over 100.5     Order Comments: Instructions:  Call office at [phone] for any drainage, increased redness, or fever over 100.5            Discharge Follow-up with PCP   As directed         Currently Documented PCP:    Keely Lo APRN    PCP Phone Number:    [phone]      Follow Up Details: in 1 week           Discharge Follow-up with Specified Provider: Cardiologist; 2 Weeks   As directed        To: Cardiologist    Follow Up: 2 Weeks    Follow Up Details: call for appointment, bring all medication bottles to appointment           Discharge Follow-up with Specified Provider: Juliane CURRIE on 10/5 1pm   As directed        To: Juliane CURRIE on 10/5 1pm    Follow Up Details: 4-6 weeks, bring all current medications to appointment           Discharge Follow-up with Specified Provider: urology; 1 Week   As directed        To: urology    Follow Up: 1 Week    Follow Up Details: call for appointment, bring all medication bottles to appointment                   Test Results Pending at Discharge     ROSEY Lake  09/09/22  12:09 EDT

## 2022-09-09 NOTE — PLAN OF CARE
Problem: Adult Inpatient Plan of Care  Goal: Plan of Care Review  Outcome: Ongoing, Progressing  Flowsheets (Taken 9/9/2022 0743)  Progress: improving  Plan of Care Reviewed With: patient  Outcome Evaluation:   VSS   A&Ox4. Pt had complaints of pain controlled by prn pain med. Pt with gross hematuria and clots during the night. Pt up in chair   walked around the unit during the shift. Pt continues on room air. WCTM  Goal: Patient-Specific Goal (Individualized)  Outcome: Ongoing, Progressing  Goal: Absence of Hospital-Acquired Illness or Injury  Outcome: Ongoing, Progressing  Intervention: Identify and Manage Fall Risk  Recent Flowsheet Documentation  Taken 9/9/2022 0600 by Leigh Mccray, RN  Safety Promotion/Fall Prevention:   activity supervised   clutter free environment maintained   gait belt   nonskid shoes/slippers when out of bed   safety round/check completed   room organization consistent  Taken 9/9/2022 0400 by Leigh Mccrya, RN  Safety Promotion/Fall Prevention:   activity supervised   clutter free environment maintained   gait belt   nonskid shoes/slippers when out of bed   room organization consistent   safety round/check completed  Taken 9/9/2022 0200 by Leigh Mccray, RN  Safety Promotion/Fall Prevention:   activity supervised   clutter free environment maintained   gait belt   nonskid shoes/slippers when out of bed   room organization consistent   safety round/check completed  Taken 9/9/2022 0000 by Leigh Mccray, RN  Safety Promotion/Fall Prevention:   activity supervised   clutter free environment maintained   gait belt   nonskid shoes/slippers when out of bed   safety round/check completed   room organization consistent  Taken 9/8/2022 2200 by Leigh Mccray, RN  Safety Promotion/Fall Prevention:   activity supervised   clutter free environment maintained   gait belt   nonskid shoes/slippers when out of bed   room organization consistent   safety round/check completed  Taken  9/8/2022 2000 by Leigh Mccray RN  Safety Promotion/Fall Prevention:   safety round/check completed   room organization consistent   nonskid shoes/slippers when out of bed   gait belt   clutter free environment maintained   activity supervised  Intervention: Prevent Skin Injury  Recent Flowsheet Documentation  Taken 9/9/2022 0600 by Leigh Mccray RN  Body Position:   position changed independently   supine  Taken 9/9/2022 0400 by Leigh Mccray RN  Body Position:   position changed independently   supine  Taken 9/9/2022 0200 by Leigh Mccray RN  Body Position:   position changed independently   supine  Taken 9/9/2022 0000 by Leigh Mccray RN  Body Position:   position changed independently   supine  Taken 9/8/2022 2200 by Leigh Mccray RN  Body Position:   position changed independently   supine  Taken 9/8/2022 2000 by Leigh Mccray RN  Body Position:   position changed independently   supine  Intervention: Prevent and Manage VTE (Venous Thromboembolism) Risk  Recent Flowsheet Documentation  Taken 9/9/2022 0600 by Leigh Mccray RN  Activity Management: activity adjusted per tolerance  Taken 9/9/2022 0400 by Leigh Mccray RN  Activity Management: activity adjusted per tolerance  VTE Prevention/Management:   bilateral   compression stockings on  Taken 9/9/2022 0200 by Leigh Mccray RN  Activity Management: activity adjusted per tolerance  Taken 9/9/2022 0000 by Leigh Mccray RN  Activity Management: activity adjusted per tolerance  VTE Prevention/Management:   bilateral   compression stockings on  Taken 9/8/2022 2200 by Leigh Mccray RN  Activity Management: activity adjusted per tolerance  Taken 9/8/2022 2000 by Leigh Mccray RN  Activity Management: activity adjusted per tolerance  VTE Prevention/Management:   bilateral   compression stockings on  Range of Motion: active ROM (range of motion) encouraged  Intervention: Prevent Infection  Recent Flowsheet  Documentation  Taken 9/9/2022 0600 by Leigh Mccray RN  Infection Prevention:   single patient room provided   rest/sleep promoted   hand hygiene promoted   environmental surveillance performed  Taken 9/9/2022 0400 by Leigh Mccray RN  Infection Prevention:   single patient room provided   rest/sleep promoted   hand hygiene promoted   environmental surveillance performed  Taken 9/9/2022 0200 by Leigh Mccray RN  Infection Prevention:   single patient room provided   rest/sleep promoted   hand hygiene promoted   environmental surveillance performed  Taken 9/9/2022 0000 by Leigh Mccray RN  Infection Prevention:   single patient room provided   rest/sleep promoted   hand hygiene promoted   environmental surveillance performed  Taken 9/8/2022 2200 by Leigh Mccray RN  Infection Prevention:   single patient room provided   rest/sleep promoted   hand hygiene promoted   environmental surveillance performed  Taken 9/8/2022 2000 by Leigh Mccray RN  Infection Prevention:   single patient room provided   rest/sleep promoted   hand hygiene promoted   environmental surveillance performed  Goal: Optimal Comfort and Wellbeing  Outcome: Ongoing, Progressing  Intervention: Provide Person-Centered Care  Recent Flowsheet Documentation  Taken 9/9/2022 0400 by Leigh Mccray RN  Trust Relationship/Rapport:   care explained   choices provided  Taken 9/9/2022 0000 by Leigh Mccray RN  Trust Relationship/Rapport:   care explained   choices provided  Taken 9/8/2022 2000 by Leigh Mccray RN  Trust Relationship/Rapport: care explained  Goal: Readiness for Transition of Care  Outcome: Ongoing, Progressing     Problem: Fall Injury Risk  Goal: Absence of Fall and Fall-Related Injury  Outcome: Ongoing, Progressing  Intervention: Identify and Manage Contributors  Recent Flowsheet Documentation  Taken 9/9/2022 0600 by Leigh Mccray RN  Medication Review/Management: medications reviewed  Taken 9/9/2022 0400  by Leigh Mccray, RN  Medication Review/Management: medications reviewed  Taken 9/9/2022 0200 by Leigh Mccray, RN  Medication Review/Management: medications reviewed  Taken 9/9/2022 0000 by Leigh Mccray RN  Medication Review/Management: medications reviewed  Taken 9/8/2022 2200 by Leigh Mccray, RN  Medication Review/Management: medications reviewed  Taken 9/8/2022 2000 by Leigh Mccray, RN  Medication Review/Management: medications reviewed  Intervention: Promote Injury-Free Environment  Recent Flowsheet Documentation  Taken 9/9/2022 0600 by Leigh Mccray, RN  Safety Promotion/Fall Prevention:   activity supervised   clutter free environment maintained   gait belt   nonskid shoes/slippers when out of bed   safety round/check completed   room organization consistent  Taken 9/9/2022 0400 by Leigh Mccray, RN  Safety Promotion/Fall Prevention:   activity supervised   clutter free environment maintained   gait belt   nonskid shoes/slippers when out of bed   room organization consistent   safety round/check completed  Taken 9/9/2022 0200 by Leigh Mccray RN  Safety Promotion/Fall Prevention:   activity supervised   clutter free environment maintained   gait belt   nonskid shoes/slippers when out of bed   room organization consistent   safety round/check completed  Taken 9/9/2022 0000 by Leigh Mccray RN  Safety Promotion/Fall Prevention:   activity supervised   clutter free environment maintained   gait belt   nonskid shoes/slippers when out of bed   safety round/check completed   room organization consistent  Taken 9/8/2022 2200 by eLigh Mccray RN  Safety Promotion/Fall Prevention:   activity supervised   clutter free environment maintained   gait belt   nonskid shoes/slippers when out of bed   room organization consistent   safety round/check completed  Taken 9/8/2022 2000 by Leigh Mccray, RN  Safety Promotion/Fall Prevention:   safety round/check completed   room organization  consistent   nonskid shoes/slippers when out of bed   gait belt   clutter free environment maintained   activity supervised     Problem: Skin Injury Risk Increased  Goal: Skin Health and Integrity  Outcome: Ongoing, Progressing  Intervention: Optimize Skin Protection  Recent Flowsheet Documentation  Taken 9/9/2022 0600 by Leigh Mccray RN  Head of Bed (HOB) Positioning: HOB at 30-45 degrees  Taken 9/9/2022 0400 by Leigh Mccray RN  Head of Bed (HOB) Positioning: HOB at 30-45 degrees  Taken 9/9/2022 0200 by Leigh Mccray RN  Head of Bed (HOB) Positioning: HOB at 30-45 degrees  Taken 9/9/2022 0000 by Leigh Mccray RN  Head of Bed (HOB) Positioning: HOB at 30-45 degrees  Taken 9/8/2022 2200 by Leigh Mccray RN  Head of Bed (HOB) Positioning: HOB at 30-45 degrees  Taken 9/8/2022 2000 by Leigh Mccray RN  Head of Bed (HOB) Positioning: HOB at 30-45 degrees     Problem: Pain Acute  Goal: Acceptable Pain Control and Functional Ability  Outcome: Ongoing, Progressing  Intervention: Prevent or Manage Pain  Recent Flowsheet Documentation  Taken 9/9/2022 0600 by Leigh Mccray RN  Medication Review/Management: medications reviewed  Taken 9/9/2022 0400 by Leigh Mccray RN  Bowel Elimination Promotion: adequate fluid intake promoted  Medication Review/Management: medications reviewed  Taken 9/9/2022 0200 by Leigh Mccray RN  Medication Review/Management: medications reviewed  Taken 9/9/2022 0000 by Leigh Mccray RN  Medication Review/Management: medications reviewed  Taken 9/8/2022 2200 by Leigh Mccray RN  Medication Review/Management: medications reviewed  Taken 9/8/2022 2000 by Leigh Mccray RN  Bowel Elimination Promotion: adequate fluid intake promoted  Medication Review/Management: medications reviewed  Intervention: Optimize Psychosocial Wellbeing  Recent Flowsheet Documentation  Taken 9/9/2022 0400 by Leigh Mccray RN  Diversional Activities: television  Taken 9/9/2022  0000 by Leigh Mccray RN  Diversional Activities: television  Taken 9/8/2022 2000 by eLigh Mccray RN  Diversional Activities: television     Problem: Activity Intolerance (Cardiovascular Surgery)  Goal: Improved Activity Tolerance  Outcome: Ongoing, Progressing     Problem: Adjustment to Surgery (Cardiovascular Surgery)  Goal: Optimal Coping with Heart Surgery  Outcome: Ongoing, Progressing  Intervention: Support Psychosocial Response to Surgery  Recent Flowsheet Documentation  Taken 9/9/2022 0400 by Leigh Mccray RN  Family/Support System Care: support provided  Taken 9/9/2022 0000 by Leigh Mccray RN  Family/Support System Care: support provided  Taken 9/8/2022 2000 by Leigh Mccray RN  Family/Support System Care: support provided     Problem: Bleeding (Cardiovascular Surgery)  Goal: Bleeding (Cardiovascular Surgery)  Outcome: Ongoing, Progressing     Problem: Bowel Motility Impaired (Cardiovascular Surgery)  Goal: Effective Bowel Elimination (Cardiovascular Surgery)  Outcome: Ongoing, Progressing  Intervention: Enhance Bowel Motility and Elimination  Recent Flowsheet Documentation  Taken 9/9/2022 0400 by Leigh Mccray RN  Bowel Elimination Management: toileting offered  Taken 9/8/2022 2000 by Leigh Mccray RN  Bowel Elimination Management: toileting offered     Problem: Cardiac Function Impaired (Cardiovascular Surgery)  Goal: Effective Cardiac Function  Outcome: Ongoing, Progressing  Intervention: Optimize Cardiac Output and Blood Flow  Recent Flowsheet Documentation  Taken 9/9/2022 0400 by Leigh Mccray RN  Dysrhythmia Management: pacing wires maintained     Problem: Cerebral Tissue Perfusion (Cardiovascular Surgery)  Goal: Optimal Cerebral Tissue Perfusion (Cardiovascular Surgery)  Outcome: Ongoing, Progressing  Intervention: Protect and Optimize Cerebral Perfusion  Recent Flowsheet Documentation  Taken 9/9/2022 0600 by Leigh Mccray RN  Head of Bed (HOB) Positioning: HOB  at 30-45 degrees  Taken 9/9/2022 0400 by Leigh Mccray RN  Head of Bed (HOB) Positioning: HOB at 30-45 degrees  Taken 9/9/2022 0200 by Leigh Mccray RN  Head of Bed (HOB) Positioning: HOB at 30-45 degrees  Taken 9/9/2022 0000 by Leigh Mccray RN  Head of Bed (HOB) Positioning: HOB at 30-45 degrees  Taken 9/8/2022 2200 by Leigh Mccray RN  Head of Bed (HOB) Positioning: HOB at 30-45 degrees  Taken 9/8/2022 2000 by Leigh Mccray RN  Head of Bed (HOB) Positioning: HOB at 30-45 degrees  Cerebral Perfusion Promotion: blood pressure monitored     Problem: Fluid and Electrolyte Imbalance (Cardiovascular Surgery)  Goal: Fluid and Electrolyte Balance (Cardiovascular Surgery)  Outcome: Ongoing, Progressing     Problem: Glycemic Control Impaired (Cardiovascular Surgery)  Goal: Blood Glucose Level Within Targeted Range (Cardiovascular Surgery)  Outcome: Ongoing, Progressing     Problem: Infection (Cardiovascular Surgery)  Goal: Absence of Infection Signs and Symptoms  Outcome: Ongoing, Progressing  Intervention: Prevent or Manage Infection  Recent Flowsheet Documentation  Taken 9/9/2022 0600 by Leigh Mccray RN  Infection Prevention:   single patient room provided   rest/sleep promoted   hand hygiene promoted   environmental surveillance performed  Taken 9/9/2022 0400 by Leigh Mccray RN  Infection Prevention:   single patient room provided   rest/sleep promoted   hand hygiene promoted   environmental surveillance performed  Taken 9/9/2022 0200 by Leigh Mccray RN  Infection Prevention:   single patient room provided   rest/sleep promoted   hand hygiene promoted   environmental surveillance performed  Taken 9/9/2022 0000 by Leigh Mccray RN  Infection Prevention:   single patient room provided   rest/sleep promoted   hand hygiene promoted   environmental surveillance performed  Taken 9/8/2022 2200 by Leigh Mccray RN  Infection Prevention:   single patient room provided   rest/sleep  promoted   hand hygiene promoted   environmental surveillance performed  Taken 9/8/2022 2000 by Leigh Mccray, RN  Infection Prevention:   single patient room provided   rest/sleep promoted   hand hygiene promoted   environmental surveillance performed     Problem: Ongoing Anesthesia Effects (Cardiovascular Surgery)  Goal: Anesthesia/Sedation Recovery  Outcome: Ongoing, Progressing  Intervention: Optimize Anesthesia Recovery  Recent Flowsheet Documentation  Taken 9/9/2022 0600 by Leigh Mccray, RN  Safety Promotion/Fall Prevention:   activity supervised   clutter free environment maintained   gait belt   nonskid shoes/slippers when out of bed   safety round/check completed   room organization consistent  Taken 9/9/2022 0400 by Leigh Mccray, RN  Safety Promotion/Fall Prevention:   activity supervised   clutter free environment maintained   gait belt   nonskid shoes/slippers when out of bed   room organization consistent   safety round/check completed  Reorientation Measures: clock in view  Taken 9/9/2022 0200 by Leigh Mccray, RN  Safety Promotion/Fall Prevention:   activity supervised   clutter free environment maintained   gait belt   nonskid shoes/slippers when out of bed   room organization consistent   safety round/check completed  Taken 9/9/2022 0000 by Leigh Mccray, RN  Safety Promotion/Fall Prevention:   activity supervised   clutter free environment maintained   gait belt   nonskid shoes/slippers when out of bed   safety round/check completed   room organization consistent  Taken 9/8/2022 2200 by Leigh Mccray, RN  Safety Promotion/Fall Prevention:   activity supervised   clutter free environment maintained   gait belt   nonskid shoes/slippers when out of bed   room organization consistent   safety round/check completed  Taken 9/8/2022 2000 by Leigh Mccray, RN  Safety Promotion/Fall Prevention:   safety round/check completed   room organization consistent   nonskid shoes/slippers when  out of bed   gait belt   clutter free environment maintained   activity supervised  Reorientation Measures: clock in view     Problem: Pain (Cardiovascular Surgery)  Goal: Acceptable Pain Control  Outcome: Ongoing, Progressing  Intervention: Prevent or Manage Pain  Recent Flowsheet Documentation  Taken 9/9/2022 0400 by Leigh Mccray RN  Diversional Activities: television  Taken 9/9/2022 0000 by Leigh Mccray RN  Diversional Activities: television  Taken 9/8/2022 2000 by Leigh Mccray RN  Diversional Activities: television     Problem: Postoperative Nausea and Vomiting (Cardiovascular Surgery)  Goal: Nausea and Vomiting Relief (Cardiovascular Surgery)  Outcome: Ongoing, Progressing     Problem: Postoperative Urinary Retention (Cardiovascular Surgery)  Goal: Effective Urinary Elimination (Cardiovascular Surgery)  Outcome: Ongoing, Progressing     Problem: Respiratory Compromise (Cardiovascular Surgery)  Goal: Effective Oxygenation and Ventilation (Cardiovascular Surgery)  Outcome: Ongoing, Progressing  Intervention: Promote Airway Secretion Clearance  Recent Flowsheet Documentation  Taken 9/8/2022 2000 by Leigh Mccray RN  Cough And Deep Breathing: done independently per patient   Goal Outcome Evaluation:  Plan of Care Reviewed With: patient        Progress: improving  Outcome Evaluation: VSS; A&Ox4. Pt had complaints of pain controlled by prn pain med. Pt with gross hematuria and clots during the night. Pt up in chair; walked around the unit during the shift. Pt continues on room air. TM

## 2022-09-09 NOTE — PROGRESS NOTES
[facility] Clinical Pharmacy Services: Patient Counseling     Chaitanya Montesinos was counseled on their new medications. Counseling points included the followin) Explained indication of each medication, and patient's need for these medications.  2) Went over dosing and frequency of each medication.  3) Discussed any special administration, storage, or monitoring instructions with medications.  4) Discussed all important drug interactions, including over-the-counter medications and supplements.  5) Explained possible side effects for each medication.  6) Instructed the patient not to begin or discontinue any medications without informing his/her physician/pharmacist.  7) Addressed any specific questions the patient had in regards to their medication:    Patient expressed understanding and had no further questions.      Shawn Banks, [facility]  Clinical Pharmacist

## 2022-09-10 NOTE — OUTREACH NOTE
Prep Survey    Flowsheet Row Responses   Scientologist facility patient discharged from? [place]   Is LACE score < 7 ? No   Emergency Room discharge w/ pulse ox? No   Eligibility Readm Mgmt   Discharge diagnosis CABG   Does the patient have one of the following disease processes/diagnoses(primary or secondary)? Cardiothoracic surgery   Does the patient have Home health ordered? Yes   What is the Home health agency?  [facility]    Is there a DME ordered? No   Prep survey completed? Yes          DAKOTA TRAORE - Registered Nurse

## 2022-09-11 NOTE — CASE MANAGEMENT/SOCIAL WORK
Case Management Discharge Note      Final Note: DC'd home 9/9 with [facility]  following    Provided Post Acute Provider Quality & Resource List?: Yes  Post Acute Provider Quality and Resource List: Home Health, Nursing Home  Delivered To: Support Person  Support Person: Cely Robertson, daughter  Method of Delivery: In person        Home Medical Care Coordination complete.    Service Provider Selected Services Address Phone Fax Patient Preferred    [facility] [id number] [address] [place] [address] [phone] --                  Transportation Services  Private: Car    Final Discharge Disposition Code: 06 - home with home health care

## 2022-09-12 ENCOUNTER — TELEPHONE (OUTPATIENT)
Dept: CARDIAC SURGERY | Facility: CLINIC | Age: 77
End: 2022-09-12

## 2022-09-12 ENCOUNTER — HOME HEALTH ADMISSION (OUTPATIENT)
Dept: HOME HEALTH SERVICES | Facility: HOME HEALTHCARE | Age: 77
End: 2022-09-12

## 2022-09-12 DIAGNOSIS — Z95.1 S/P CABG (CORONARY ARTERY BYPASS GRAFT): Primary | ICD-10-CM

## 2022-09-12 RX ORDER — ALPRAZOLAM 0.25 MG/1
0.5 TABLET ORAL 3 TIMES DAILY PRN
Qty: 15 TABLET | Refills: 0 | Status: SHIPPED | OUTPATIENT
Start: 2022-09-12

## 2022-09-12 NOTE — CASE MANAGEMENT/SOCIAL WORK
Case Management Discharge Note      Final Note: Pt d/c code changed to Home or self care b/c HH unable to see Pt within 48 hours.  Pt dishcarged home 9/9/22 to his address in [place] ([address] [place] [address] [address] [place] [id number]).  [facility]  was not able to staff the [facility] zip code in a timely manner.  [facility] spoke with toni Ta ([phone]) and she reqeusted [facility] .  [facility] called referral to Sania/Cindy  and she accepted......Missy BRITO/[facility] CM    Provided Post Acute Provider Quality & Resource List?: Yes  Post Acute Provider Quality and Resource List: Home Health, Nursing Home  Delivered To: Support Person  Support Person: Cely Robertson, toni  Method of Delivery: In person    Selected Continued Care - Discharged on 9/9/2022 Admission date: 8/25/2022 - Discharge disposition: Home-Health Care Svc    Destination    No services have been selected for the patient.              Durable Medical Equipment    No services have been selected for the patient.              Dialysis/Infusion    No services have been selected for the patient.              Home Medical Care Coordination complete.    Service Provider Selected Services Address Phone Fax Patient Preferred    [facility]  [facility] [address] [address] [phone] --          Therapy    No services have been selected for the patient.              Community Resources    No services have been selected for the patient.              Community & DME    No services have been selected for the patient.                  Transportation Services  Private: Car    Final Discharge Disposition Code: 01 - home or self-care

## 2022-09-12 NOTE — TELEPHONE ENCOUNTER
Pt called to report that he is having trouble staying asleep and that he is feeling anxious. Pt requesting medication to help him relax. Medication to be called in by Dr. Ruiz for 3 days and then pt will reach out to PCP for further assistance. Pt verbalized understanding and agreed to plan of care.

## 2022-09-13 ENCOUNTER — READMISSION MANAGEMENT (OUTPATIENT)
Dept: CALL CENTER | Facility: HOSPITAL | Age: 77
End: 2022-09-13

## 2022-09-13 ENCOUNTER — HOME CARE VISIT (OUTPATIENT)
Dept: HOME HEALTH SERVICES | Facility: HOME HEALTHCARE | Age: 77
End: 2022-09-13

## 2022-09-13 VITALS
DIASTOLIC BLOOD PRESSURE: 64 MMHG | RESPIRATION RATE: 20 BRPM | TEMPERATURE: 96.2 F | HEART RATE: 60 BPM | WEIGHT: 266.44 LBS | SYSTOLIC BLOOD PRESSURE: 130 MMHG | OXYGEN SATURATION: 98 % | HEIGHT: 77 IN | BODY MASS INDEX: 31.46 KG/M2

## 2022-09-13 PROCEDURE — G0299 HHS/HOSPICE OF RN EA 15 MIN: HCPCS

## 2022-09-13 NOTE — OUTREACH NOTE
CT Surgery Week 1 Survey    Flowsheet Row Responses   [facility] patient discharged from? [place]   Does the patient have one of the following disease processes/diagnoses(primary or secondary)? Cardiothoracic surgery   Week 1 attempt successful? Yes   Call start time 0822   Call end time 0839   Discharge diagnosis CABG   Person spoke with today (if not patient) and relationship Daughter   Meds reviewed with patient/caregiver? Yes   Is the patient having any side effects they believe may be caused by any medication additions or changes? No   Does the patient have all medications related to this admission filled (includes all antibiotics, pain medications, cardiac medications, etc.) Yes   Is the patient taking all medications as directed (includes completed medication regime)? Yes   Comments regarding appointments Surgeon 10/5/22    Does the patient have a primary care provider?  Yes   Does the patient have an appointment scheduled with their C/T surgeon? Yes   Comments regarding PCP Will make per daughter   Has the patient kept scheduled appointments due by today? N/A   Comments Uro- daughter has call out   What is the Home health agency?  [facility]   Has home health visited the patient within 72 hours of discharge? Call prior to 72 hours   Psychosocial issues? No   Did the patient receive a copy of their discharge instructions? Yes   Nursing interventions Reviewed instructions with patient   What is the patient's perception of their health status since discharge? Improving   Nursing interventions Nurse provided patient education   Is the patient /caregiver able to teach back basic post-op care? Take showers only when approved by MD-sponge bathe until then, No tub bath, swimming, or hot tub until instructed by MD, Lifting as instructed by MD in discharge instructions, Continue use of incentive spirometry at least 1 week post discharge   Is the patient/caregiver able to teach back signs and symptoms of incisional  infection? Increased redness, swelling or pain at the incisonal site, Increased drainage or bleeding, Incisional warmth, Pus or odor from incision, Fever   Is the patient/caregiver able to teach back steps to recovery at home? Eat a well-balance diet, Rest and rebuild strength, gradually increase activity, Set small, achievable goals for return to baseline health   Is the patient /caregiver able to teach back the importance of cardiac rehab? Yes   Nursing interventions Provided education on importance of cardiac rehab   Is the patient/caregiver able to teach back the hierarchy of who to call/visit for symptoms/problems? PCP, Specialist, Home health nurse, Urgent Care, ED, 911 Yes   Week 1 call completed? Yes   Wrap up additional comments Per daughter Pt having bladder spasms. Daughter has a call out to Uro and HH is to see Pt today            MG LY - Registered Nurse

## 2022-09-14 ENCOUNTER — HOME CARE VISIT (OUTPATIENT)
Dept: HOME HEALTH SERVICES | Facility: HOME HEALTHCARE | Age: 77
End: 2022-09-14

## 2022-09-14 VITALS
HEART RATE: 60 BPM | DIASTOLIC BLOOD PRESSURE: 72 MMHG | OXYGEN SATURATION: 95 % | TEMPERATURE: 96.7 F | SYSTOLIC BLOOD PRESSURE: 140 MMHG

## 2022-09-14 PROCEDURE — G0151 HHCP-SERV OF PT,EA 15 MIN: HCPCS

## 2022-09-14 NOTE — HOME HEALTH
PT SEEN BY  [facility] STAY 8/25-9/9 FOR POST OP CABGX 4 WITH L JANAE AND L SVG.   POST OP COMPLICATED BY ILEUS, AFIB, URINARY RETENTION REQUIRING INDWELLING F/C AND TO F/U  WITH UROLOGY  APPT MADE FOR F/U WITH UROLOGY ON THURSDAY        FOC SN TO SEE FOR INSTRUCTION ON POST OP CABG X 4 WITH L JANAE AND L SVG AND COMPLICATIONS, MED INSTRUCT, INSTRUCT  ON INCISON CARE AND S/S INFECTION, PAIN MANAGEMENT, INSTRUCT ON F/C CARE AND S/S COMPLICATIONS

## 2022-09-14 NOTE — HOME HEALTH
Pt is post op cabgx4 with L JANAE and LSVG.  Post op pt experienced urinary retention and has catheter; sees urologist Thursday.     Plan for next visit:  bed mobility  transfers sit to stand  UE/LE HEP

## 2022-09-16 ENCOUNTER — HOME CARE VISIT (OUTPATIENT)
Dept: HOME HEALTH SERVICES | Facility: HOME HEALTHCARE | Age: 77
End: 2022-09-16

## 2022-09-16 VITALS
TEMPERATURE: 97.1 F | WEIGHT: 275.5 LBS | DIASTOLIC BLOOD PRESSURE: 74 MMHG | RESPIRATION RATE: 20 BRPM | SYSTOLIC BLOOD PRESSURE: 134 MMHG | HEART RATE: 64 BPM | BODY MASS INDEX: 32.67 KG/M2 | OXYGEN SATURATION: 95 %

## 2022-09-16 VITALS — OXYGEN SATURATION: 97 % | HEART RATE: 72 BPM

## 2022-09-16 PROCEDURE — G0157 HHC PT ASSISTANT EA 15: HCPCS

## 2022-09-16 PROCEDURE — G0299 HHS/HOSPICE OF RN EA 15 MIN: HCPCS

## 2022-09-16 NOTE — HOME HEALTH
Subjective: They took the catheter out last night    Wound- CABG x 4  Edema- minimal  falls - none  Medication changes- none  doctor follow up 10-5-22    Assessment: Patient has plans for Cardiac rehab in a few weeks. He has improved on transfers and gets up without assistance today. He walked with no AD 5:51 with cues for self pacing for endurance and slow steady pacing.  Patient took self to bathroom to use urinal two times. He was educated on hydration and nutrition and when to call doctor and family present.    Plan for next visit/Communication  gait training > 6 minutes  review and progress HEP  bed and chair transfers.

## 2022-09-17 NOTE — HOME HEALTH
PT SEEN BY HH POST HOSP[facility] STAY 8/25-9/9 FOR POST OP CABGX 4 WITH L JANAE AND L SVG. POST OP COMPLICATED BY ILEUS, AFIB, URINARY RETENTION REQUIRING INDWELLING F/C   F/C DCD

## 2022-09-19 ENCOUNTER — TELEPHONE (OUTPATIENT)
Dept: CARDIAC REHAB | Facility: HOSPITAL | Age: 77
End: 2022-09-19

## 2022-09-19 NOTE — TELEPHONE ENCOUNTER
Patient is undecided as to where he will attend cardiac rehab.Provide contact information for our program, patient to call when ready to schedule.

## 2022-09-20 ENCOUNTER — HOME CARE VISIT (OUTPATIENT)
Dept: HOME HEALTH SERVICES | Facility: HOME HEALTHCARE | Age: 77
End: 2022-09-20

## 2022-09-20 ENCOUNTER — TELEPHONE (OUTPATIENT)
Dept: CARDIAC SURGERY | Facility: CLINIC | Age: 77
End: 2022-09-20

## 2022-09-20 VITALS
DIASTOLIC BLOOD PRESSURE: 56 MMHG | BODY MASS INDEX: 32.03 KG/M2 | RESPIRATION RATE: 16 BRPM | SYSTOLIC BLOOD PRESSURE: 104 MMHG | TEMPERATURE: 96.3 F | HEART RATE: 76 BPM | OXYGEN SATURATION: 97 % | WEIGHT: 270.13 LBS

## 2022-09-20 VITALS — OXYGEN SATURATION: 95 % | HEART RATE: 66 BPM

## 2022-09-20 PROCEDURE — G0299 HHS/HOSPICE OF RN EA 15 MIN: HCPCS

## 2022-09-20 PROCEDURE — G0157 HHC PT ASSISTANT EA 15: HCPCS

## 2022-09-20 NOTE — HOME HEALTH
PT SEEN BY  POST [facility]OSP[facility] STAY 8/25-9/9 FOR POST OP CABGX 4 WITH L JANAE AND L SVG. POST OP COMPLICATED BY ILEUS, AFIB,   SEE ORDER PERFORM ORTHOSTATICS FRIDAY AND CALL [place] WITH DR VAL FREY B/P 9/23

## 2022-09-20 NOTE — TELEPHONE ENCOUNTER
Received a call from pt's home health RN, Jillian, stating that pt has had a 6lb weight loss in 10 days and that sitting BP is 122/62 while standing BP is 104/56. Pt has not taken any home meds this AM. Notified ROSEY Briggs, who recommends pt hold bumex and potassium until 9/23 when KYM Walsh, can reevaluate pt. Jillian will recheck orthostatics 9/23 and call office for further recommendations.

## 2022-09-20 NOTE — HOME HEALTH
Subjective: I am a little tired today    Wound- CABG x 4 - pictures taken by nursing  Edema- minimal   falls - none   Medication changes- took off of lasix and potassium- education by nurse prior to therapist arrival  doctor follow up 10-5-22   Cardiac Rehab- TBD    Assessment:Discussed progression to Cardiac rehab after next week and spouse is going to set it up. Patient able to increase mobility time however is still slow with makayla. He improved on getting in and out of bed with cues for log rolling, He took himself to bathroom two times during session. He was able to review HEP with visual demo as needed.    Plan for next visit/Communication   gait training > 10  minutes   review and progress HEP   transfers

## 2022-09-22 ENCOUNTER — HOME CARE VISIT (OUTPATIENT)
Dept: HOME HEALTH SERVICES | Facility: HOME HEALTHCARE | Age: 77
End: 2022-09-22

## 2022-09-22 VITALS
BODY MASS INDEX: 32.49 KG/M2 | HEART RATE: 75 BPM | DIASTOLIC BLOOD PRESSURE: 68 MMHG | OXYGEN SATURATION: 96 % | SYSTOLIC BLOOD PRESSURE: 128 MMHG | WEIGHT: 274 LBS | TEMPERATURE: 96.8 F

## 2022-09-22 PROCEDURE — G0157 HHC PT ASSISTANT EA 15: HCPCS

## 2022-09-23 ENCOUNTER — TELEPHONE (OUTPATIENT)
Dept: CARDIAC SURGERY | Facility: CLINIC | Age: 77
End: 2022-09-23

## 2022-09-23 ENCOUNTER — HOME CARE VISIT (OUTPATIENT)
Dept: HOME HEALTH SERVICES | Facility: HOME HEALTHCARE | Age: 77
End: 2022-09-23

## 2022-09-23 VITALS
DIASTOLIC BLOOD PRESSURE: 64 MMHG | WEIGHT: 277 LBS | SYSTOLIC BLOOD PRESSURE: 130 MMHG | OXYGEN SATURATION: 99 % | RESPIRATION RATE: 16 BRPM | TEMPERATURE: 96 F | BODY MASS INDEX: 32.85 KG/M2 | HEART RATE: 60 BPM

## 2022-09-23 PROCEDURE — G0299 HHS/HOSPICE OF RN EA 15 MIN: HCPCS

## 2022-09-23 PROCEDURE — G0180 MD CERTIFICATION HHA PATIENT: HCPCS | Performed by: THORACIC SURGERY (CARDIOTHORACIC VASCULAR SURGERY)

## 2022-09-23 NOTE — TELEPHONE ENCOUNTER
Received a call from KYM Walsh, stating that pt;s BP after stopping bumex and potassium was 130/64 HR 60 sitting and 124/74 HR 60 standing. Weight 9/20 was 270.2 lbs, today weight is 277.4 lbs. Per KYM Walsh, pt is not showing any signs of edema or fluid overload. Notified ROSEY Cardozo, who recommends pt continue to weigh daily and notify office of any further weight gain or signs of fluid overload. KYM Walsh, notified pt.

## 2022-09-23 NOTE — HOME HEALTH
PT SEEN BY [facility] STAY 8/25-9/9 FOR POST OP CABGX 4 WITH L JANAE AND L SVG. POST OP COMPLICATED BY ILEUS, AFIB, URINARY RETENTION REQUIRING INDWELLING F/C  F/C DC.   PT  DIURTEICS ON HOLD SEE ORDERS       MONITOR WEIGHT EDEMA AN DLUNGS CLOSELY

## 2022-09-27 ENCOUNTER — HOME CARE VISIT (OUTPATIENT)
Dept: HOME HEALTH SERVICES | Facility: HOME HEALTHCARE | Age: 77
End: 2022-09-27

## 2022-09-27 VITALS
HEART RATE: 66 BPM | BODY MASS INDEX: 33.13 KG/M2 | WEIGHT: 279.4 LBS | SYSTOLIC BLOOD PRESSURE: 128 MMHG | OXYGEN SATURATION: 98 % | TEMPERATURE: 96.6 F | DIASTOLIC BLOOD PRESSURE: 68 MMHG

## 2022-09-27 VITALS
SYSTOLIC BLOOD PRESSURE: 140 MMHG | RESPIRATION RATE: 16 BRPM | BODY MASS INDEX: 33.08 KG/M2 | WEIGHT: 279 LBS | TEMPERATURE: 96.6 F | OXYGEN SATURATION: 99 % | HEART RATE: 82 BPM | DIASTOLIC BLOOD PRESSURE: 68 MMHG

## 2022-09-27 PROCEDURE — G0157 HHC PT ASSISTANT EA 15: HCPCS

## 2022-09-27 PROCEDURE — G0299 HHS/HOSPICE OF RN EA 15 MIN: HCPCS

## 2022-09-28 ENCOUNTER — HOME CARE VISIT (OUTPATIENT)
Dept: HOME HEALTH SERVICES | Facility: HOME HEALTHCARE | Age: 77
End: 2022-09-28

## 2022-09-30 ENCOUNTER — READMISSION MANAGEMENT (OUTPATIENT)
Dept: CALL CENTER | Facility: HOSPITAL | Age: 77
End: 2022-09-30

## 2022-09-30 NOTE — OUTREACH NOTE
CT Surgery Week 4 Survey    Flowsheet Row Responses   [facility] patient discharged from? [place]   Does the patient have one of the following disease processes/diagnoses(primary or secondary)? Cardiothoracic surgery   Week 4 attempt successful? Yes   Call start time 1349   Call end time 1350   Discharge diagnosis CABG   Is patient permission given to speak with other caregiver? Yes   List who call center can speak with Daughter    Person spoke with today (if not patient) and relationship Daughter    Home health comments Per [facility], it appears pt has been seen by  services    Psychosocial issues? No   What is the patient's perception of their health status since discharge? Improving   Is the patient/caregiver able to teach back the hierarchy of who to call/visit for symptoms/problems? PCP, Specialist, Home health nurse, Urgent Care, ED, 911 Yes   Week 4 Call Completed? Yes   Would the patient like one additional call? No   Graduated Yes   Did the patient feel the follow up calls were helpful during their recovery period? Yes   Was the number of calls appropriate? Yes   Wrap up additional comments Call brief-no answer when patient and spouse were attempted-daughter states the pt is doing pretty good.           YOSSI LY - Registered Nurse

## 2022-10-04 ENCOUNTER — HOME CARE VISIT (OUTPATIENT)
Dept: HOME HEALTH SERVICES | Facility: HOME HEALTHCARE | Age: 77
End: 2022-10-04

## 2022-10-05 ENCOUNTER — OFFICE VISIT (OUTPATIENT)
Dept: CARDIAC SURGERY | Facility: CLINIC | Age: 77
End: 2022-10-05

## 2022-10-05 VITALS
SYSTOLIC BLOOD PRESSURE: 135 MMHG | RESPIRATION RATE: 20 BRPM | DIASTOLIC BLOOD PRESSURE: 62 MMHG | HEIGHT: 77 IN | BODY MASS INDEX: 33.06 KG/M2 | OXYGEN SATURATION: 97 % | HEART RATE: 60 BPM | TEMPERATURE: 97.5 F | WEIGHT: 280 LBS

## 2022-10-05 DIAGNOSIS — Z95.1 S/P CABG X 4: Primary | ICD-10-CM

## 2022-10-05 PROCEDURE — 99024 POSTOP FOLLOW-UP VISIT: CPT | Performed by: NURSE PRACTITIONER

## 2022-10-05 RX ORDER — HYDROCHLOROTHIAZIDE 12.5 MG/1
12.5 TABLET ORAL DAILY
Qty: 30 TABLET | Refills: 2 | Status: SHIPPED | OUTPATIENT
Start: 2022-10-05

## 2022-10-05 NOTE — PROGRESS NOTES
"CARDIOVASCULAR SURGERY FOLLOW-UP PROGRESS NOTE  Chief Complaint: Postop follow-up        HPI:   Dear Dr. Lo, ROSEY Lay and colleagues:    It was nice to see Chaitanya Montesinos in follow up 6 weeks after surgery.  As you know, he is a 77 y.o. 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 Ruiz.  Intraoperatively they were unable to anchor a Terrazas, urology was consulted and cystoscopy with catheter placement was placed in the OR. He had issues postoperatively with colonic ileus that resolved prior to discharge after general surgery consulted and administration of neostigmine with bowel rest.  He additionally had postoperative atrial fibrillation for several days, Eliquis was started, and the patient developed gross hematuria requiring discontinuation of anticoagulation.  He did regain normal sinus rhythm and had no further dysrhythmias.  He did have to go home with a Terrazas catheter and that has been removed, he is now urinating and defecating without issue.  He comes in today complaining of nothing.  His activity level has been good, he is due to start cardiac rehab soon.  From a surgical standpoint, the sternal incision is well approximated without erythema, edema, or drainage.  The sternum is stable to palpation, and the patient denies any popping or clicking with deep inspiration or coughing.  His blood pressure is mildly elevated in the office today, and he has 1+ pretibial edema, will reinitiate hydrochlorothiazide at 12.5 mg daily, further titration per his cardiologist.  He has an appointment with Dr. Inman on Monday.    Physical Exam:         /62 (BP Location: Left arm, Patient Position: Sitting, Cuff Size: Adult)   Pulse 60   Temp 97.5 °F (36.4 °C)   Resp 20   Ht 195.6 cm (77\")   Wt 127 kg (280 lb)   " SpO2 97%   BMI 33.20 kg/m²   Heart:  regular rate and rhythm, S1, S2 normal, no murmur, click, rub or gallop  Lungs:  clear to auscultation bilaterally  Extremities:  1+ lower extremity edema bilaterally  Incision(s):  mid chest healing well, left leg healing well, sternum stable    Assessment/Plan:     S/P CABG. Overall, he is doing well.    Post-op atrial fibrillation  Slow post-op rehabilitation progress    Keep incisions clean and dry  OK to begin cardiac rehab  Follow-up as scheduled with cardiology  Follow-up with CT surgery prn    Continue lifting restriction of 10 lbs until 6 weeks and 50 lbs until 12 weeks from the date of surgery, no excessive jarring motions or twisting motions until 12 weeks from the date of surgery    Return to clinic if any signs or symptoms of infection or sternal instability develop       Thank you for allowing me to participate in the care of your patient.    Regards,  ROSEY Cooper    Current outpatient and discharge medications have been reconciled for the patient.  Reviewed by: ROSEY Cooper

## 2022-10-07 PROBLEM — Z95.1 S/P CABG X 4: Status: ACTIVE | Noted: 2022-10-07

## 2023-06-15 ENCOUNTER — OFFICE (OUTPATIENT)
Dept: URBAN - METROPOLITAN AREA CLINIC 76 | Facility: CLINIC | Age: 78
End: 2023-06-15

## 2023-06-15 VITALS
SYSTOLIC BLOOD PRESSURE: 119 MMHG | WEIGHT: 288 LBS | HEIGHT: 77 IN | HEART RATE: 85 BPM | DIASTOLIC BLOOD PRESSURE: 80 MMHG

## 2023-06-15 DIAGNOSIS — M10.9 GOUT, UNSPECIFIED: ICD-10-CM

## 2023-06-15 DIAGNOSIS — K75.4 AUTOIMMUNE HEPATITIS: ICD-10-CM

## 2023-06-15 DIAGNOSIS — R74.8 ABNORMAL LEVELS OF OTHER SERUM ENZYMES: ICD-10-CM

## 2023-06-15 PROCEDURE — 99204 OFFICE O/P NEW MOD 45 MIN: CPT | Performed by: INTERNAL MEDICINE

## 2024-05-28 ENCOUNTER — TELEPHONE (OUTPATIENT)
Dept: CARDIAC SURGERY | Facility: CLINIC | Age: 79
End: 2024-05-28
Payer: MEDICARE

## 2025-03-09 NOTE — NURSING NOTE
Call into on call for urology regarding multiple medium sized clots being passed in urine; no new orders. WCTM   Yes

## (undated) DEVICE — NITINOL WIRE WITH HYDROPHILIC TIP: Brand: SENSOR

## (undated) DEVICE — DECANT BG O JET

## (undated) DEVICE — UNDERCAST PADDING: Brand: DEROYAL

## (undated) DEVICE — Device

## (undated) DEVICE — BNDG ELAS ELITE V/CLOSE 4IN 5YD LF STRL

## (undated) DEVICE — HARMONIC SYNERGY DISSECTING HOOK WITH TORQUE WRENCH. FOR USE WITH BLUE HAND PIECE ONLY: Brand: HARMONIC SYNERGY

## (undated) DEVICE — SENSR CERBRL O2 PK/2

## (undated) DEVICE — APPL CHLORAPREP W/TINT 26ML ORNG

## (undated) DEVICE — Device: Brand: LEVEL 1

## (undated) DEVICE — GLV SURG BIOGEL LTX PF 7

## (undated) DEVICE — PIN TROC SIGNATURE PK/2

## (undated) DEVICE — CLAMP INSERT: Brand: STEALTH® CLAMP INSERT

## (undated) DEVICE — SUT VIC 0 CT1 CR8 27IN JJ41G

## (undated) DEVICE — CORONARY ARTERY BYPASS GRAFT MARKERS, STAINLESS STEEL, DISTAL, WITHOUT HOLDER: Brand: ANASTOMARK CORONARY ARTERY BYPASS GRAFT MARKERS, STAINLESS STEEL, DISTAL

## (undated) DEVICE — STPLR SKIN VISISTAT WD 35CT

## (undated) DEVICE — PK KN TOTL 40

## (undated) DEVICE — ANTIBACTERIAL UNDYED BRAIDED (POLYGLACTIN 910), SYNTHETIC ABSORBABLE SUTURE: Brand: COATED VICRYL

## (undated) DEVICE — ENCORE® LATEX ORTHO SIZE 7.5, STERILE LATEX POWDER-FREE SURGICAL GLOVE: Brand: ENCORE

## (undated) DEVICE — PK HEART OPN 40

## (undated) DEVICE — BIOPATCH™ ANTIMICROBIAL DRESSING WITH CHLORHEXIDINE GLUCONATE IS A HYDROPHILLIC POLYURETHANE ABSORPTIVE FOAM WITH CHLORHEXIDINE GLUCONATE (CHG) WHICH INHIBITS BACTERIAL GROWTH UNDER THE DRESSING. THE DRESSING IS INTENDED TO BE USED TO ABSORB EXUDATE, COVER A WOUND CAUSED BY VASCULAR AND NONVASCULAR PERCUTANEOUS MEDICAL DEVICES DURING SURGERY, AS WELL AS REDUCE LOCAL INFECTION AND COLONIZATION OF MICROORGANISMS.: Brand: BIOPATCH

## (undated) DEVICE — BNDG ELAS ELITE V/CLOSE 6IN 5YD LF STRL

## (undated) DEVICE — HEMOCONCENTRATOR PERFUS LPS06

## (undated) DEVICE — TBG INSUFFLATION LUER LOCK: Brand: MEDLINE INDUSTRIES, INC.

## (undated) DEVICE — CVR PROB 96IN LF STRL

## (undated) DEVICE — ROTATING SURGICAL PUNCHES, 1 PER POUCH: Brand: A&E MEDICAL / ROTATING SURGICAL PUNCHES

## (undated) DEVICE — DRN WND CH RND FUL/FLUT NO/TROC 3/8IN 28F

## (undated) DEVICE — MAT FLR ABSORBENT LG 4FT 10 2.5FT

## (undated) DEVICE — ADHS SKIN SURG TISS VISC PREMIERPRO EXOFIN HI/VISC FAST/DRY

## (undated) DEVICE — SOL IRR NACL 0.9PCT BT 1000ML

## (undated) DEVICE — SUT SILK 0 CT1 CR8 18IN C021D

## (undated) DEVICE — OASIS DRAIN, SINGLE, INLINE & ATS COMPATIBLE: Brand: OASIS

## (undated) DEVICE — PK SUT OPN HEART POLLOCK CUST

## (undated) DEVICE — GLV SURG SIGNATURE ESSENTIAL PF LTX SZ7.5

## (undated) DEVICE — 32 FR RIGHT ANGLE – SOFT PVC CATHETER: Brand: PVC THORACIC CATHETERS

## (undated) DEVICE — DUAL CUT SAGITTAL BLADE

## (undated) DEVICE — BG TRANSF W/COUPLER SPK 600ML

## (undated) DEVICE — MEDICINE CUP, GRADUATED, STER: Brand: MEDLINE

## (undated) DEVICE — NDL SPINE 20G 3 1/2 YEL STRL 1P/U

## (undated) DEVICE — LOU OPEN HEART DR POLLOCK: Brand: MEDLINE INDUSTRIES, INC.

## (undated) DEVICE — GLV SURG BIOGEL LTX PF 6 1/2

## (undated) DEVICE — PK PERFUS CUST W/CARDIOPLEGIA

## (undated) DEVICE — SOL IRR H2O BTL 1000ML STRL

## (undated) DEVICE — OCCLUSIVE GAUZE STRIP,3% BISMUTH TRIBROMOPHENATE IN PETROLATUM BLEND: Brand: XEROFORM

## (undated) DEVICE — PAD,ABDOMINAL,8"X10",ST,LF: Brand: MEDLINE

## (undated) DEVICE — BLAKE SILICONE DRAINS CARDIO CONNECTOR 2:1: Brand: BLAKE

## (undated) DEVICE — SYS PERFUS SEP PLATLT W TIPS CUST

## (undated) DEVICE — GLV SURG BIOGEL LTX PF 7 1/2

## (undated) DEVICE — KT DRN EVAC WND PVC PCH WTROC RND 10F400

## (undated) DEVICE — SUT PROLN MO.5 7/0 DBLARM BV175 6 2X30 BX/12

## (undated) DEVICE — SYS VASOVIEW HEMOPRO ENDOSCOPIC HARVST VESL

## (undated) DEVICE — GLV SURG BIOGEL SENSR LTX PF SZ7.5

## (undated) DEVICE — SPNG GZ WOVN 4X4IN 12PLY 10/BX STRL

## (undated) DEVICE — CANN AORT SFT/FLW STR 24F8MM

## (undated) DEVICE — GLV SURG BIOGEL M LTX PF 7 1/2

## (undated) DEVICE — LABEL SHEET CUSTOM 2X2 YELLOW: Brand: MEDLINE INDUSTRIES, INC.

## (undated) DEVICE — SOL ISO/ALC RUB 70PCT 4OZ

## (undated) DEVICE — DRP SLUSH WARMR MACH CIR 44X44IN

## (undated) DEVICE — PK ATS CUST W CARDIOTOMY RESEVOIR

## (undated) DEVICE — DRAPE,REIN 53X77,STERILE: Brand: MEDLINE

## (undated) DEVICE — ST. SORBAVIEW ULTIMATE IJ SYSTEM A,C: Brand: CENTURION

## (undated) DEVICE — DRSNG WND GEL FIBR OPTICELL AG PLS W/SLV LF 4X5IN  STRL

## (undated) DEVICE — BLOWER/MISTER AXIOUS OPCAB W/TBG

## (undated) DEVICE — SYR LUERLOK 20CC BX/50

## (undated) DEVICE — DRSNG SURESITE WNDW 2.38X2.75